# Patient Record
Sex: FEMALE | Race: WHITE | HISPANIC OR LATINO | Employment: UNEMPLOYED | URBAN - METROPOLITAN AREA
[De-identification: names, ages, dates, MRNs, and addresses within clinical notes are randomized per-mention and may not be internally consistent; named-entity substitution may affect disease eponyms.]

---

## 2017-01-09 ENCOUNTER — TELEPHONE (OUTPATIENT)
Dept: OBSTETRICS AND GYNECOLOGY | Facility: CLINIC | Age: 22
End: 2017-01-09

## 2017-01-09 NOTE — TELEPHONE ENCOUNTER
Called patient and she states the cream helped for a few days but she is feeling vaginal itching again and states she is not having discharge just itching on the outside. Patient has an appt this Friday and will show us then in the meantime she will use hydrocortisone cream.

## 2017-01-09 NOTE — TELEPHONE ENCOUNTER
----- Message from Alana Zamorano sent at 1/9/2017  9:43 AM CST -----  Contact: self, [phone]  [place] speaking patient called in requesting to speak with you regarding infection medication prescribed. Please advise.

## 2017-01-13 ENCOUNTER — ROUTINE PRENATAL (OUTPATIENT)
Dept: OBSTETRICS AND GYNECOLOGY | Facility: CLINIC | Age: 22
End: 2017-01-13
Payer: MEDICAID

## 2017-01-13 VITALS
WEIGHT: 137.81 LBS | BODY MASS INDEX: 24.41 KG/M2 | DIASTOLIC BLOOD PRESSURE: 64 MMHG | SYSTOLIC BLOOD PRESSURE: 120 MMHG

## 2017-01-13 DIAGNOSIS — Z3A.19 19 WEEKS GESTATION OF PREGNANCY: Primary | ICD-10-CM

## 2017-01-13 DIAGNOSIS — N89.8 VAGINAL DISCHARGE: ICD-10-CM

## 2017-01-13 DIAGNOSIS — Z34.82 ENCOUNTER FOR SUPERVISION OF OTHER NORMAL PREGNANCY IN SECOND TRIMESTER: ICD-10-CM

## 2017-01-13 LAB
CANDIDA RRNA VAG QL PROBE: NEGATIVE
G VAGINALIS RRNA GENITAL QL PROBE: NEGATIVE
T VAGINALIS RRNA GENITAL QL PROBE: NEGATIVE

## 2017-01-13 PROCEDURE — 99213 OFFICE O/P EST LOW 20 MIN: CPT | Mod: PBBFAC,PO | Performed by: OBSTETRICS & GYNECOLOGY

## 2017-01-13 PROCEDURE — 87480 CANDIDA DNA DIR PROBE: CPT

## 2017-01-13 PROCEDURE — 99213 OFFICE O/P EST LOW 20 MIN: CPT | Mod: TH,S$PBB,, | Performed by: OBSTETRICS & GYNECOLOGY

## 2017-01-13 PROCEDURE — 99999 PR PBB SHADOW E&M-EST. PATIENT-LVL III: CPT | Mod: PBBFAC,,, | Performed by: OBSTETRICS & GYNECOLOGY

## 2017-01-13 RX ORDER — METRONIDAZOLE 500 MG/1
500 TABLET ORAL EVERY 12 HOURS
Qty: 14 TABLET | Refills: 0 | Status: SHIPPED | OUTPATIENT
Start: 2017-01-13 | End: 2017-01-20

## 2017-01-13 RX ORDER — CLOTRIMAZOLE AND BETAMETHASONE DIPROPIONATE 10; .64 MG/G; MG/G
CREAM TOPICAL
Qty: 15 G | Refills: 1 | Status: SHIPPED | OUTPATIENT
Start: 2017-01-13 | End: 2017-03-23

## 2017-01-14 NOTE — PROGRESS NOTES
No complaints today   No nausea or vomiting   Denies vaginal bleeding or cramping   FHT 155x'  Verified with Doppler.  Prenatal labs reviewed      General Pregnancy  recommendations given  PNV + H2O intake  Quad screen ordered but patient declined screening  FU in 4 weeks  Anatomy US at 20 weeks   RX flagyl sent       Sven Smiley M.D.   OB/GYN

## 2017-01-16 ENCOUNTER — TELEPHONE (OUTPATIENT)
Dept: OBSTETRICS AND GYNECOLOGY | Facility: CLINIC | Age: 22
End: 2017-01-16

## 2017-01-16 ENCOUNTER — LAB VISIT (OUTPATIENT)
Dept: LAB | Facility: HOSPITAL | Age: 22
End: 2017-01-16
Attending: OBSTETRICS & GYNECOLOGY
Payer: MEDICAID

## 2017-01-16 DIAGNOSIS — Z3A.15 15 WEEKS GESTATION OF PREGNANCY: ICD-10-CM

## 2017-01-16 PROCEDURE — 36415 COLL VENOUS BLD VENIPUNCTURE: CPT

## 2017-01-16 PROCEDURE — 81511 FTL CGEN ABNOR FOUR ANAL: CPT

## 2017-01-16 NOTE — TELEPHONE ENCOUNTER
----- Message from Alana Lona sent at 1/16/2017 12:19 PM CST -----  Contact: self, [phone]  Patient states she is calling back looking to reschedule her ultrasound appt. Please advise.

## 2017-01-16 NOTE — TELEPHONE ENCOUNTER
----- Message from Alana Lona sent at 1/16/2017 12:06 PM CST -----  Contact: self, [phone]  Patient called in requesting to reschedule her 1/18 ultrasound appt.  Please advise.

## 2017-01-18 ENCOUNTER — OFFICE VISIT (OUTPATIENT)
Dept: OBSTETRICS AND GYNECOLOGY | Facility: CLINIC | Age: 22
End: 2017-01-18
Payer: MEDICAID

## 2017-01-18 DIAGNOSIS — Z3A.19 19 WEEKS GESTATION OF PREGNANCY: ICD-10-CM

## 2017-01-18 DIAGNOSIS — Z36.3 ANTENATAL SCREENING FOR MALFORMATION USING ULTRASONICS: Primary | ICD-10-CM

## 2017-01-18 PROCEDURE — 76805 OB US >/= 14 WKS SNGL FETUS: CPT | Mod: 26,S$PBB,, | Performed by: OBSTETRICS & GYNECOLOGY

## 2017-01-18 PROCEDURE — 76805 OB US >/= 14 WKS SNGL FETUS: CPT | Mod: PBBFAC,PO

## 2017-01-19 ENCOUNTER — TELEPHONE (OUTPATIENT)
Dept: OBSTETRICS AND GYNECOLOGY | Facility: CLINIC | Age: 22
End: 2017-01-19

## 2017-01-19 NOTE — TELEPHONE ENCOUNTER
----- Message from Sven Smiley MD sent at 1/13/2017 11:48 PM CST -----  AFFIRM is  negative     Sven Smiley M.D.   OB/GYN

## 2017-01-19 NOTE — TELEPHONE ENCOUNTER
----- Message from Catherine Heredia sent at 1/19/2017  1:02 PM CST -----  Contact: ms jon with [facility]/[phone]  Please call to discuss maternity information

## 2017-01-25 LAB
ALPHA FETOPROTEIN MATERNAL: 50.1 NG/ML
DOWN RISK (<1:270): NORMAL
ETHNIC ORIGIN: NORMAL
GA METHOD: NORMAL
GESTATIONAL AGE (DAYS): 3
GESTATIONAL AGE (WEEKS): 19
HUMAN CHORIONIC GONADOTROPIN: 11.8 IU/ML
INHIBIN A: 129.3 PG/ML
INSULIN DEPEND. DIABETES: NORMAL
M.O.M. ALPHA FETOPROTEIN: 0.91
M.O.M. HCG: 0.54
M.O.M. INHIBIN A: 0.67
M.O.M. UNCONJ. ESTRIOL: 1.35
MATERNAL AGE AT EDD (YRS): 24
MATERNAL AGE FOR DOWN: NORMAL
MATERNAL WEIGHT (LBS): 133
MULTIPLE GESTATIONS: NORMAL
QUAD SCREEN INTERPRETATION: NORMAL
QUAD SCREEN: NEGATIVE
TRISOMY 18 (<1:100): NORMAL
UNCONJUGATED ESTRIOL: 2.49 NG/ML

## 2017-01-26 ENCOUNTER — TELEPHONE (OUTPATIENT)
Dept: OBSTETRICS AND GYNECOLOGY | Facility: CLINIC | Age: 22
End: 2017-01-26

## 2017-01-26 NOTE — TELEPHONE ENCOUNTER
Spoke to on call doctor Dr. Wiedemann and he states we are going to call in Diflucan 150 mg #1 refill x1 take by mouth starting today. Called pharmacy they agreed and verbalized understanding.

## 2017-01-26 NOTE — TELEPHONE ENCOUNTER
Called patient she states she is still having vaginal discharge and it looks like cottage cheese and also complains of itching states vaginal cream sent irritates her and feels like its burning instructed her to discontinue cream and I will speak to the doctor about calling in a different medication patient agreed and verbalized understanding.

## 2017-01-26 NOTE — TELEPHONE ENCOUNTER
----- Message from Alana Lona sent at 1/26/2017 11:32 AM CST -----  Contact: self, [phone]  Patient requests medication for an infection sent to her pharmacy. Please advise.

## 2017-01-28 NOTE — PROGRESS NOTES
Quad Screen resulted:  SCREEN NEGATIVE FOR DOWN SYNDROME, NEURAL TUBE DEFECTS   AND TRISOMY 18     Sven Smiley M.D.   OB/GYN

## 2017-02-01 ENCOUNTER — TELEPHONE (OUTPATIENT)
Dept: OBSTETRICS AND GYNECOLOGY | Facility: CLINIC | Age: 22
End: 2017-02-01

## 2017-02-01 NOTE — TELEPHONE ENCOUNTER
----- Message from Danica Pace sent at 2/1/2017 10:30 AM CST -----  Contact: Self [phone]  Patient is calling to talk to nurse she would like to know if the doctor can prescribe her a medication for a vaginal infection and she needs refills on her prenatal. Please advice

## 2017-02-01 NOTE — TELEPHONE ENCOUNTER
Called and informed patient that vitamins have 5 refills and she just need to request refill and patient states the vaginal burning is still going on and I informed her she would need to come in for us to reevaluate since it still has not cleared up and patient has been on a lot of different cream and medications patient agreed and verbalized understanding.

## 2017-02-02 ENCOUNTER — ROUTINE PRENATAL (OUTPATIENT)
Dept: OBSTETRICS AND GYNECOLOGY | Facility: CLINIC | Age: 22
End: 2017-02-02
Payer: MEDICAID

## 2017-02-02 VITALS
WEIGHT: 138.25 LBS | SYSTOLIC BLOOD PRESSURE: 104 MMHG | BODY MASS INDEX: 24.49 KG/M2 | DIASTOLIC BLOOD PRESSURE: 80 MMHG

## 2017-02-02 DIAGNOSIS — N89.8 VAGINAL DISCHARGE: ICD-10-CM

## 2017-02-02 DIAGNOSIS — Z3A.21 21 WEEKS GESTATION OF PREGNANCY: Primary | ICD-10-CM

## 2017-02-02 DIAGNOSIS — Z34.82 ENCOUNTER FOR SUPERVISION OF OTHER NORMAL PREGNANCY IN SECOND TRIMESTER: ICD-10-CM

## 2017-02-02 PROCEDURE — 99999 PR PBB SHADOW E&M-EST. PATIENT-LVL II: CPT | Mod: PBBFAC,,, | Performed by: OBSTETRICS & GYNECOLOGY

## 2017-02-02 PROCEDURE — 99212 OFFICE O/P EST SF 10 MIN: CPT | Mod: PBBFAC,PO | Performed by: OBSTETRICS & GYNECOLOGY

## 2017-02-02 PROCEDURE — 99213 OFFICE O/P EST LOW 20 MIN: CPT | Mod: TH,S$PBB,, | Performed by: OBSTETRICS & GYNECOLOGY

## 2017-02-02 PROCEDURE — 87480 CANDIDA DNA DIR PROBE: CPT

## 2017-02-02 RX ORDER — NYSTATIN AND TRIAMCINOLONE ACETONIDE 100000; 1 [USP'U]/G; MG/G
OINTMENT TOPICAL 2 TIMES DAILY
Qty: 60 G | Refills: 0 | Status: SHIPPED | OUTPATIENT
Start: 2017-02-02 | End: 2017-03-23

## 2017-02-02 NOTE — PROGRESS NOTES
Vaginal itching, and discharge  Took diflucan with some relief  No nausea or vomiting   Denies vaginal bleeding or cramping   FHT 144x'  Verified with Doppler.  Prenatal labs reviewed      General Pregnancy  recommendations given  PNV + H2O intake  Quad screen ordered but patient declined screening  FU in 4 weeks  Anatomy US at 20 weeks normal anatomy     AFFIRM today   Mycolog cream to apply locally on external genitalia     Sven Smiley M.D.   OB/GYN

## 2017-02-04 LAB
CANDIDA RRNA VAG QL PROBE: POSITIVE
G VAGINALIS RRNA GENITAL QL PROBE: POSITIVE
T VAGINALIS RRNA GENITAL QL PROBE: NEGATIVE

## 2017-02-06 ENCOUNTER — TELEPHONE (OUTPATIENT)
Dept: OBSTETRICS AND GYNECOLOGY | Facility: CLINIC | Age: 22
End: 2017-02-06

## 2017-02-06 RX ORDER — METRONIDAZOLE 500 MG/1
500 TABLET ORAL EVERY 12 HOURS
Qty: 14 TABLET | Refills: 0 | Status: SHIPPED | OUTPATIENT
Start: 2017-02-06 | End: 2017-02-13

## 2017-02-06 RX ORDER — FLUCONAZOLE 150 MG/1
150 TABLET ORAL ONCE
Qty: 1 TABLET | Refills: 2 | Status: SHIPPED | OUTPATIENT
Start: 2017-02-06 | End: 2017-02-06

## 2017-02-06 NOTE — TELEPHONE ENCOUNTER
Affirm resulted  Positive for BV and Yeast .  Rx for flagyl and diflucan sent to pharmacy on file  Please inform patient    Sven Smiley M.D.   OB/GYN

## 2017-02-06 NOTE — TELEPHONE ENCOUNTER
Called and informed patient of results and Rx at pharmacy patient agreed and verbalized understanding.

## 2017-02-10 NOTE — TELEPHONE ENCOUNTER
Pt never picked her prescription and was picking up OTC prenatals. Pt is now requesting a script. Medication has been pended.

## 2017-02-10 NOTE — TELEPHONE ENCOUNTER
----- Message from Yuliet Ghotra sent at 2/9/2017 12:38 PM CST -----  Contact: [phone]/ self   Pt its requesting a refill on her prenatal vitamins . Please advise

## 2017-02-20 ENCOUNTER — HOSPITAL ENCOUNTER (OUTPATIENT)
Facility: HOSPITAL | Age: 22
Discharge: HOME | End: 2017-02-20
Attending: OBSTETRICS & GYNECOLOGY | Admitting: OBSTETRICS & GYNECOLOGY
Payer: MEDICAID

## 2017-02-20 ENCOUNTER — TELEPHONE (OUTPATIENT)
Dept: OBSTETRICS AND GYNECOLOGY | Facility: CLINIC | Age: 22
End: 2017-02-20

## 2017-02-20 DIAGNOSIS — O36.8190 DECREASED FETAL MOVEMENT: ICD-10-CM

## 2017-02-20 PROCEDURE — 99211 OFF/OP EST MAY X REQ PHY/QHP: CPT

## 2017-02-20 NOTE — PLAN OF CARE
Pt arrived to unit from home with c/o decreased fetal movement. Pt states she has not felt her baby move in 4 hours. Pt denins abd pain, leaking fluid, or vaginal bleeding. Pt shown to room 357, changing into gown.     1050- no trouble obtained fht's with efm. Audible movement on monitor. Pt relieved to hear baby's heartbeat. Dr. smiley paged.    1058- spoke with dr. Smiley. Informed md pt came in with complaints of DFM, but efm's are great, and baby heard moving on monitor. Discharge orders received.

## 2017-02-20 NOTE — TELEPHONE ENCOUNTER
Had to call this patients number to speak with another patient. Upon calling this patient, pt informed us that she has not felt her baby move in 24 hours. Advised pt that she needs to go to L&D.

## 2017-02-20 NOTE — IP AVS SNAPSHOT
[facility]  [address]  [place] [address]  Phone: [phone]           Instrucciones de Kelly de Pacientes    Nuestro objetivo es que te prepara para el éxito. Nancy paquete incluye información sobre gastelum enfermedad, medicamentos y atención médica a domicilio. [employer] le ayudará a cuidar y que no se enferman más y necesita volver al hospital.     Por favor, pregunte a gastelum enfermera si tiene alguna pregunta.       Hay muchos detalles a recordar cuando se prepara para salir del hospital. [employer] es lo que necesita hacer:    1. [place] gastelum medicina. Si usted tiene [place] receta, revise la lista de medicamentos en las siguientes páginas. Es posible que tenga nuevos medicamentos para recoger en la farmacia y otros que tendrá que dejar de rayshawn. Revise las instrucciones sobre cómo y cuándo rayshawn abel medicamentos. Consulte con el médico o el enfermeras si no está seguro de qué hacer.    2. Ir a abel citas de seguimiento. La información específica de seguimiento aparece en las siguientes páginas. Usted puede ser contactado por [place] enfermera o proveedor clínico sobre las próximas citas. Estar seguro de que tiene todos los números de teléfono para comunicarse si es necesario. Por favor, póngase en contacto con la oficina de gastelum profesional médico si no puede hacer [place] thalia.     3. Esté atento a señales de advertencia. El médico o la enfermera le dará señales de alarma detallados que debe observar y cuándo llamar para la ayuda. Estas instrucciones también pueden incluir información educativa acerca de gastelum condición. Si usted experimenta cualquiera de las señales de advertencia para gastelum manohar, llame a gastelum médico.             [facility]    Si gastelum médico no le ha indicado a lo contrário, por favor   contactar a [facility] [place], nuestra línea de cuidado de enfermeras está disponible para asistirle 24/7.    [phone] (servicio gratuito)    Enfermeras registradas de [facility] pueden ayudarle a reservar [place] thalia,  proveer educación para la manohar, asesoría clínica, y otros servicios de asesoramiento.                  ** Verificar la lista de medicamentos es correcta y está actualizada. Llevar esto con usted en gloria de emergencia. Si carmela medicamentos rubin cambiado, por favor notifique a gastelum proveedor de atención médica.             Lista de medicamentos      CONSULTE con gastelum médico sobre estos medicamentos        Additional Info                      clotrimazole-betamethasone 1-0.05% cream   También conocido luis antonio:  LOTRISONE   Cantidad:  15 g   Recargas:  1    Instrucciones:  Use on external genitals 3 times a week     Begin Date    AM    Noon    PM    Bedtime       nystatin-triamcinolone ointment   También conocido luis antonio:  MYCOLOG   Cantidad:  60 g   Recargas:  0    Instrucciones:  Apply topically 2 (two) times daily.     Begin Date    AM    Noon    PM    Bedtime       PNV #26-iron ps-folic acid-dha 29 mg iron- 1 mg-200 mg Cap   También conocido luis antonio:  VITAFOL-ONE   Cantidad:  60 capsule   Recargas:  5   Dosis:  1 tablet    Instrucciones:  Take 1 tablet by mouth once daily.     Begin Date    AM    Noon    PM    Bedtime       terconazole 0.8 % vaginal cream   También conocido luis antonio:  TERAZOL 3   Cantidad:  20 g   Recargas:  0   Dosis:  1 applicator    Instrucciones:  Place 1 applicator vaginally nightly.     Begin Date    AM    Noon    PM    Bedtime                  Por favor traiga a todos las citas de seguimiento:    1. Maty copia de las instrucciones de maryam.  2. Todos los medicamentos que está tomando zen momento, en carmela envases originales.  3. Identificación y tarjeta de seguro.    Por favor llegue 15 minutos antes de la hora de la thalia.    Por favor llame con 24 horas de antelación si tiene que cambiar gastelum thalia y / o tiempo.        Carmela Citas Programadas     Mar 02, 2017  2:30 PM CST   Routine Prenatal Visit with MD [place] [facility] ([place]    [address]  [address] [facility], [address]  [place]  [address]   [phone]                  Instrucciones a julissa de maryam       Discharge home in stable condition. Follow up with Dr. Smiley at next scheduled appointment.      Información de Admisión     Fecha y hora Proveedor Departamento CSN    2/20/2017 10:24 AM Sven Smiley MD [facility] [id number]      Los proveedores de cuidado     Personal Médico Rol [facility] Teléfono principal de la oficina    Sven Smiley MD Attending Provider Obstetrics and Gynecology [phone]      Carmela signos vitales erica     Ultima menstruación                   08/21/2016           Recent Lab Values     No lab values to display.      Alergias     A partir del:  2/20/2017        No Known Allergies      Directiva Anticipada     Maty directiva anticipada es un documento que, en gloria de que ya no capaz de hacer decisiones por sí mismo, le dice a gastelum equipo médico qué tipo de tratamiento quiere o no quiere recibir, o que le gustaría rayshawn esas decisiones para usted . Si actualmente no tiene maty directiva anticipada, [facility] le anima a crear raghu. Para más información llame al: [phone] ([phone]), o entrando en [email or web address].        Language Assistance Services     ATTENTION: Language assistance services are available, free of charge. Please call [phone].      ATENCIÓN: Si habla español, tiene a gastelum disposición servicios gratuitos de asistencia lingüística. Llame al [phone].     CHÚ Ý: N?u b?n nói Ti?ng Vi?t, có các d?ch v? h? tr? ngôn ng? mi?n phí dành cho b?n. G?i s? [phone].        Registrarse para [facility]     La activación de gastelum cuenta [facility] es tan fácil luis antonio 1-2-3!    1) Ir a [email or web address], seleccione Registrarse Ahora, meter el código de activación y gastelum fecha de nacimiento, y seleccione Próximo.    [id number]  Expires: 4/6/2017 11:01 AM      2) Crear un nombre de usuario y contraseña para usar cuando se visita [facility] en  el futuro y selecciona [place] pregunta de seguridad en glroia de que pierda gastelum contraseña y seleccione Próximo.    3) Introduzca gastelum dirección de correo electrónico y cecil chinmay en Registrarse!    Información Adicional  Si tiene alguna pregunta, por favor, e-mail [email or web address] o llame al [phone] para hablar con nuestro personal. Recuerde, Aparnatrace no debe ser usada para necesidades urgentes. En gloria de emergencia médica, llame al 911.         [facility] cumple con las leyes federales aplicables de derechos civiles y no discrimina por motivos de puneet, color, origen nacional, edad, discapacidad, o sexo.                        [facility]  [address]  [place] [address]  Phone: [phone]           Patient Discharge Instructions     Our goal is to set you up for success. This packet includes information on your condition, medications, and your home care. It will help you to care for yourself so you don't get sicker and need to go back to the hospital.     Please ask your nurse if you have any questions.        There are many details to remember when preparing to leave the hospital. Here is what you will need to do:    1. Take your medicine. If you are prescribed medications, review your Medication List in the following pages. You may have new medications to  at the pharmacy and others that you'll need to stop taking. Review the instructions for how and when to take your medications. Talk with your doctor or nurses if you are unsure of what to do.     2. Go to your follow-up appointments. Specific follow-up information is listed in the following pages. Your may be contacted by a transition nurse or clinical provider about future appointments. Be sure we have all of the phone numbers to reach you, if needed. Please contact your provider's office if you are unable to make an appointment.     3. Watch for warning signs. Your doctor or nurse will give you detailed warning signs  to watch for and when to call for assistance. These instructions may also include educational information about your condition. If you experience any of warning signs to your health, call your doctor.               [facility]  Unless otherwise directed by your provider, please contact [facility], our nurse care line that is available for 24/7 assistance.     [phone] (toll-free)    Registered nurses in the [facility] provide clinical advisement, health education, appointment booking, and other advisory services.                ** Verificar la lista de medicamentos es correcta y está actualizada. Llevar esto con usted en gloria de emergencia. Si abel medicamentos rubin cambiado, por favor notifique a gastelum proveedor de atención médica.             Medication List      ASK your doctor about these medications        Additional Info                      clotrimazole-betamethasone 1-0.05% cream   Commonly known as:  LOTRISONE   Quantity:  15 g   Refills:  1    Instructions:  Use on external genitals 3 times a week     Begin Date    AM    Noon    PM    Bedtime       nystatin-triamcinolone ointment   Commonly known as:  MYCOLOG   Quantity:  60 g   Refills:  0    Instructions:  Apply topically 2 (two) times daily.     Begin Date    AM    Noon    PM    Bedtime       PNV #26-iron ps-folic acid-dha 29 mg iron- 1 mg-200 mg Cap   Commonly known as:  VITAFOL-ONE   Quantity:  60 capsule   Refills:  5   Dose:  1 tablet    Instructions:  Take 1 tablet by mouth once daily.     Begin Date    AM    Noon    PM    Bedtime       terconazole 0.8 % vaginal cream   Commonly known as:  TERAZOL 3   Quantity:  20 g   Refills:  0   Dose:  1 applicator    Instructions:  Place 1 applicator vaginally nightly.     Begin Date    AM    Noon    PM    Bedtime                  Por favor traiga a todos las citas de seguimiento:    1. Maty copia de las instrucciones de maryam.  2. Todos los medicamentos que está tomando zen momento, en  abel envases originales.  3. Identificación y tarjeta de seguro.    Por favor llegue 15 minutos antes de la hora de la thalia.    Por favor llame con 24 horas de antelación si tiene que cambiar gastelum thalia y / o tiempo.        Your Scheduled Appointments     Mar 02, 2017  2:30 PM CST   Routine Prenatal Visit with MD [place] [facility] ([place]    [address]  [address] [facility], [address]  [place] [id number] [phone]                  Discharge Instructions       Discharge home in stable condition. Follow up with Dr. Smiley at next scheduled appointment.      Admission Information     Date & Time Provider Department CSN    2/20/2017 10:24 AM Sven Smiley MD [facility] [id number]      Care Providers     Provider Role Specialty Primary office phone    Sven Smiley MD Attending Provider Obstetrics and Gynecology [phone]      Your Vitals Were     Last Period                   08/21/2016           Recent Lab Values     No lab values to display.      Allergies as of 2/20/2017     No Known Allergies      Advance Directives     An advance directive is a document which, in the event you are no longer able to make decisions for yourself, tells your healthcare team what kind of treatment you do or do not want to receive, or who you would like to make those decisions for you.  If you do not currently have an advance directive, [facility] encourages you to create one.  For more information call:  [phone]), [phone] ([phone]),  or log on to [email or web address].        Language Assistance Services     ATTENTION: Language assistance services are available, free of charge. Please call [phone].      ATENCIÓN: Si habla español, tiene a gastelum disposición servicios gratuitos de asistencia lingüística. Llame al [phone].     CHÚ Ý: N?u b?n nói Ti?ng Vi?t, có các d?ch v? h? tr? ngôn ng? mi?n phí dành cho b?n. G?i s? [phone].         [facility] Sign-Up     Activating your [facility] account is as easy as 1-2-3!     1) Visit [email or web address], select Sign Up Now, enter this activation code and your date of birth, then select Next.  [email or web address]  Expires: 4/6/2017 11:01 AM      2) Create a username and password to use when you visit [facility] in the future and select a security question in case you lose your password and select Next.    3) Enter your e-mail address and click Sign Up!    Additional Information  If you have questions, please e-mail [email or web address] or call [phone] to talk to our [facility] staff. Remember, [facility] is NOT to be used for urgent needs. For medical emergencies, dial 911.          [facility] complies with applicable Federal civil rights laws and does not discriminate on the basis of race, color, national origin, age, disability, or sex.

## 2017-03-02 ENCOUNTER — ROUTINE PRENATAL (OUTPATIENT)
Dept: OBSTETRICS AND GYNECOLOGY | Facility: CLINIC | Age: 22
End: 2017-03-02
Payer: MEDICAID

## 2017-03-02 VITALS
WEIGHT: 142.88 LBS | BODY MASS INDEX: 25.31 KG/M2 | SYSTOLIC BLOOD PRESSURE: 112 MMHG | DIASTOLIC BLOOD PRESSURE: 68 MMHG

## 2017-03-02 DIAGNOSIS — N89.8 VAGINAL DISCHARGE: Primary | ICD-10-CM

## 2017-03-02 DIAGNOSIS — Z34.82 ENCOUNTER FOR SUPERVISION OF OTHER NORMAL PREGNANCY IN SECOND TRIMESTER: ICD-10-CM

## 2017-03-02 DIAGNOSIS — Z3A.25 25 WEEKS GESTATION OF PREGNANCY: ICD-10-CM

## 2017-03-02 PROCEDURE — 99999 PR PBB SHADOW E&M-EST. PATIENT-LVL II: CPT | Mod: PBBFAC,,, | Performed by: OBSTETRICS & GYNECOLOGY

## 2017-03-02 PROCEDURE — 87480 CANDIDA DNA DIR PROBE: CPT

## 2017-03-02 PROCEDURE — 99212 OFFICE O/P EST SF 10 MIN: CPT | Mod: PBBFAC,PO | Performed by: OBSTETRICS & GYNECOLOGY

## 2017-03-02 PROCEDURE — 99213 OFFICE O/P EST LOW 20 MIN: CPT | Mod: TH,S$PBB,, | Performed by: OBSTETRICS & GYNECOLOGY

## 2017-03-03 ENCOUNTER — TELEPHONE (OUTPATIENT)
Dept: OBSTETRICS AND GYNECOLOGY | Facility: CLINIC | Age: 22
End: 2017-03-03

## 2017-03-03 RX ORDER — METRONIDAZOLE 500 MG/1
500 TABLET ORAL EVERY 12 HOURS
Qty: 14 TABLET | Refills: 0 | Status: SHIPPED | OUTPATIENT
Start: 2017-03-03 | End: 2017-03-10

## 2017-03-03 RX ORDER — FLUCONAZOLE 150 MG/1
150 TABLET ORAL ONCE
Qty: 1 TABLET | Refills: 2 | Status: SHIPPED | OUTPATIENT
Start: 2017-03-03 | End: 2017-03-03

## 2017-03-03 NOTE — PROGRESS NOTES
Vaginal itching, and discharge  Took diflucan with some relief but coming back   No nausea or vomiting   Denies vaginal bleeding or cramping   FHT 138x'  Verified with Doppler.  Prenatal labs reviewed  AFFIRM     General Pregnancy  recommendations given  PNV + H2O intake  Quad screen ordered but patient declined screening  FU in 4 weeks  Anatomy US at 20 weeks normal anatomy     AFFIRM today   Mycolog cream to apply locally on external genitalia     Sven Smiley M.D.   OB/GYN

## 2017-03-06 ENCOUNTER — TELEPHONE (OUTPATIENT)
Dept: OBSTETRICS AND GYNECOLOGY | Facility: CLINIC | Age: 22
End: 2017-03-06

## 2017-03-09 ENCOUNTER — HOSPITAL ENCOUNTER (OUTPATIENT)
Facility: HOSPITAL | Age: 22
Discharge: HOME | End: 2017-03-09
Attending: OBSTETRICS & GYNECOLOGY | Admitting: OBSTETRICS & GYNECOLOGY
Payer: MEDICAID

## 2017-03-09 ENCOUNTER — TELEPHONE (OUTPATIENT)
Dept: OBSTETRICS AND GYNECOLOGY | Facility: CLINIC | Age: 22
End: 2017-03-09

## 2017-03-09 VITALS — TEMPERATURE: 99 F

## 2017-03-09 DIAGNOSIS — Z3A.26 26 WEEKS GESTATION OF PREGNANCY: ICD-10-CM

## 2017-03-09 PROCEDURE — 96372 THER/PROPH/DIAG INJ SC/IM: CPT

## 2017-03-09 PROCEDURE — 99220 PR INITIAL OBSERVATION CARE,LEVL III: CPT | Mod: 25,,, | Performed by: OBSTETRICS & GYNECOLOGY

## 2017-03-09 PROCEDURE — 99211 OFF/OP EST MAY X REQ PHY/QHP: CPT

## 2017-03-09 PROCEDURE — 59025 FETAL NON-STRESS TEST: CPT

## 2017-03-09 PROCEDURE — 63600175 PHARM REV CODE 636 W HCPCS: Performed by: OBSTETRICS & GYNECOLOGY

## 2017-03-09 PROCEDURE — 59025 FETAL NON-STRESS TEST: CPT | Mod: 26,,, | Performed by: OBSTETRICS & GYNECOLOGY

## 2017-03-09 RX ORDER — ACETAMINOPHEN 500 MG
500 TABLET ORAL EVERY 6 HOURS PRN
Status: DISCONTINUED | OUTPATIENT
Start: 2017-03-09 | End: 2017-03-09 | Stop reason: HOSPADM

## 2017-03-09 RX ORDER — TERBUTALINE SULFATE 1 MG/ML
0.25 INJECTION SUBCUTANEOUS ONCE
Status: COMPLETED | OUTPATIENT
Start: 2017-03-09 | End: 2017-03-09

## 2017-03-09 RX ORDER — TERBUTALINE SULFATE 1 MG/ML
INJECTION SUBCUTANEOUS
Status: DISCONTINUED
Start: 2017-03-09 | End: 2017-03-09 | Stop reason: HOSPADM

## 2017-03-09 RX ORDER — ONDANSETRON 8 MG/1
8 TABLET, ORALLY DISINTEGRATING ORAL EVERY 8 HOURS PRN
Status: DISCONTINUED | OUTPATIENT
Start: 2017-03-09 | End: 2017-03-09 | Stop reason: HOSPADM

## 2017-03-09 RX ADMIN — TERBUTALINE SULFATE 0.25 MG: 1 INJECTION SUBCUTANEOUS at 05:03

## 2017-03-09 NOTE — IP AVS SNAPSHOT
[facility]  [address]  [place] [address]  Phone: [phone]           Instrucciones de Kelly de Pacientes    Nuestro objetivo es que te prepara para el éxito. Nancy paquete incluye información sobre gastelum enfermedad, medicamentos y atención médica a domicilio. [place] le ayudará a cuidar y que no se enferman más y necesita volver al hospital.     Por favor, pregunte a gastelum enfermera si tiene alguna pregunta.       Hay muchos detalles a recordar cuando se prepara para salir del hospital. [place] es lo que necesita hacer:    1. [place] gastelum medicina. Si usted tiene [place] receta, revise la lista de medicamentos en las siguientes páginas. Es posible que tenga nuevos medicamentos para recoger en la farmacia y otros que tendrá que dejar de rayshawn. Revise las instrucciones sobre cómo y cuándo rayshawn abel medicamentos. Consulte con el médico o el enfermeras si no está seguro de qué hacer.    2. Ir a abel citas de seguimiento. La información específica de seguimiento aparece en las siguientes páginas. Usted puede ser contactado por [place] enfermera o proveedor clínico sobre las próximas citas. Estar seguro de que tiene todos los números de teléfono para comunicarse si es necesario. Por favor, póngase en contacto con la oficina de gastelum profesional médico si no puede hacer [place] thalia.     3. Esté atento a señales de advertencia. El médico o la enfermera le dará señales de alarma detallados que debe observar y cuándo llamar para la ayuda. Estas instrucciones también pueden incluir información educativa acerca de gastelum condición. Si usted experimenta cualquiera de las señales de advertencia para gastelum manohar, llame a gastelum médico.             [facility]    Si gastelum médico no le ha indicado a lo contrário, por favor   contactar a [facility], nuestra línea de cuidado de enfermeras está disponible para asistirle 24/7.    [phone] (servicio gratuito)    Enfermeras registradas de [facility] pueden ayudarle a reservar [place] thalia,  proveer educación para la manohar, asesoría clínica, y otros servicios de asesoramiento.                  ** Verificar la lista de medicamentos es correcta y está actualizada. Llevar esto con usted en gloria de emergencia. Si abel medicamentos rubin cambiado, por favor notifique a gastelum proveedor de atención médica.             Lista de medicamentos      CONSULTE con gastelum médico sobre estos medicamentos        Additional Info                      clotrimazole-betamethasone 1-0.05% cream   También conocido luis antonio:  LOTRISONE   Cantidad:  15 g   Recargas:  1    Instrucciones:  Use on external genitals 3 times a week     Begin Date    AM    Noon    PM    Bedtime       metronidazole 500 MG tablet   También conocido luis antonio:  FLAGYL   Cantidad:  14 tablet   Recargas:  0   Dosis:  500 mg    Instrucciones:  Take 1 tablet (500 mg total) by mouth every 12 (twelve) hours.     Begin Date    AM    Noon    PM    Bedtime       nystatin-triamcinolone ointment   También conocido luis antonio:  MYCOLOG   Cantidad:  60 g   Recargas:  0    Instrucciones:  Apply topically 2 (two) times daily.     Begin Date    AM    Noon    PM    Bedtime       PNV #26-iron ps-folic acid-dha 29 mg iron- 1 mg-200 mg Cap   También conocido luis antonio:  VITAFOL-ONE   Cantidad:  60 capsule   Recargas:  5   Dosis:  1 tablet    Instrucciones:  Take 1 tablet by mouth once daily.     Begin Date    AM    Noon    PM    Bedtime       terconazole 0.8 % vaginal cream   También conocido luis antonio:  TERAZOL 3   Cantidad:  20 g   Recargas:  0   Dosis:  1 applicator    Instrucciones:  Place 1 applicator vaginally nightly.     Begin Date    AM    Noon    PM    Bedtime                  Por favor traiga a todos las citas de seguimiento:    1. Maty copia de las instrucciones de maryam.  2. Todos los medicamentos que está tomando zen momento, en abel envases originales.  3. Identificación y tarjeta de seguro.    Por favor llegue 15 minutos antes de la hora de la thalia.    Por favor llame con 24 horas de antelación  si tiene que cambiar gastelum thalia y / o tiempo.        Carmela Citas Programadas     Mar 16, 2017  3:00 PM CDT   Non-Fasting Lab with APPOINTMENT LAB, [facility]   [facility])    [address]  [place] [id number] [phone]            Mar 23, 2017  3:00 PM CDT   Routine Prenatal Visit with MD Kamryn [place] - OB/GYN ([place])    [address]  [address]th [address] [facility], [address]  [place] [address] [phone]                  Instrucciones a julissa de maryam       Patient instructed to return to the ER for any further problems. Instructed to keep scheduled appointment with Dr. Smiley. Instructed to increase fluid intake and to rest as needed.       Información de Admisión     Fecha y hora Proveedor Departamento CSN    3/9/2017  4:08 PM Sven Smiley MD [facility] [id number]      Los proveedores de cuidado     Personal Médico Rol [facility] Teléfono principal de la oficina    Sven Smiley MD Attending Provider Obstetrics and Gynecology [phone]      Carmela signos vitales erica     Temperatura Ultima menstruación                98.5 °F (36.9 °C) (Oral) 08/21/2016          Recent Lab Values     No lab values to display.      Alergias     A partir del:  3/9/2017        No Known Allergies      Directiva Anticipada     Maty directiva anticipada es un documento que, en gloria de que ya no capaz de hacer decisiones por sí mismo, le dice a gastelum equipo médico qué tipo de tratamiento quiere o no quiere recibir, o que le gustaría rayshawn esas decisiones para usted . Si actualmente no tiene maty directiva anticipada, [facility] le anima a crear raghu. Para más información llame al: [phone] ([phone]), o entrando en [email or web address].        Language Assistance Services     ATTENTION: Language assistance services are available, free of charge. Please call [phone].      ATENCIÓN: ronni Brand  disposición servicios gratuitos de asistencia lingüística. Llame al [phone].     [facility] Ý: N?u b?n nói Ti?ng Vi?t, có các d?ch v? h? tr? ngôn ng? mi?n phí dành cho b?n. G?i s? [phone].        Registrarse para [facility]     La activación de gastelum cuenta [facility] es tan fácil luis antonio 1-2-3!    1) Ir a [email or web address], seleccione Registrarse Ahora, meter el código de activación y gastelum fecha de nacimiento, y seleccione Próximo.    [id number]  Expires: 4/6/2017 11:01 AM      2) Crear un nombre de usuario y contraseña para usar cuando se visita [facility] en el futuro y selecciona [place] pregunta de seguridad en gloria de que pierda gastelum contraseña y seleccione Próximo.    3) Introduzca gastelum dirección de correo electrónico y cecil clic en Registrarse!    Información Adicional  Si tiene alguna pregunta, por favor, e-mail [email or web address] o llame al [phone] para hablar con nuestro personal. Recuerde, [facility] no debe ser usada para necesidades urgentes. En golria de emergencia médica, llame al 911.         [facility] cumple con las leyes federales aplicables de derechos civiles y no discrimina por motivos de puneet, color, origen nacional, edad, discapacidad, o sexo.                        \[facility] [place] [facility]\""  [address]  [place] [address]  Phone: [phone]           Patient Discharge Instructions     Our goal is to set you up for success. This packet includes information on your condition, medications, and your home care. It will help you to care for yourself so you don't get sicker and need to go back to the hospital.     Please ask your nurse if you have any questions.        There are many details to remember when preparing to leave the hospital. Here is what you will need to do:    1. Take your medicine. If you are prescribed medications, review your Medication List in the following pages. You may have new medications to  at the pharmacy and others that you'll need to stop taking.  Review the instructions for how and when to take your medications. Talk with your doctor or nurses if you are unsure of what to do.     2. Go to your follow-up appointments. Specific follow-up information is listed in the following pages. Your may be contacted by a transition nurse or clinical provider about future appointments. Be sure we have all of the phone numbers to reach you, if needed. Please contact your provider's office if you are unable to make an appointment.     3. Watch for warning signs. Your doctor or nurse will give you detailed warning signs to watch for and when to call for assistance. These instructions may also include educational information about your condition. If you experience any of warning signs to your health, call your doctor.               [facility]  Unless otherwise directed by your provider, please contact [facility], our nurse care line that is available for 24/7 assistance.     [phone] (toll-free)    Registered nurses in the [facility] provide clinical advisement, health education, appointment booking, and other advisory services.                ** Verificar la lista de medicamentos es correcta y está actualizada. Llevar esto con usted en gloria de emergencia. Si abel medicamentos rubin cambiado, por favor notifique a gastelum proveedor de atención médica.             Medication List      ASK your doctor about these medications        Additional Info                      clotrimazole-betamethasone 1-0.05% cream   Commonly known as:  LOTRISONE   Quantity:  15 g   Refills:  1    Instructions:  Use on external genitals 3 times a week     Begin Date    AM    Noon    PM    Bedtime       metronidazole 500 MG tablet   Commonly known as:  FLAGYL   Quantity:  14 tablet   Refills:  0   Dose:  500 mg    Instructions:  Take 1 tablet (500 mg total) by mouth every 12 (twelve) hours.     Begin Date    AM    Noon    PM    Bedtime       nystatin-triamcinolone ointment   Commonly  known as:  MYCOLOG   Quantity:  60 g   Refills:  0    Instructions:  Apply topically 2 (two) times daily.     Begin Date    AM    Noon    PM    Bedtime       PNV #26-iron ps-folic acid-dha 29 mg iron- 1 mg-200 mg Cap   Commonly known as:  VITAFOL-ONE   Quantity:  60 capsule   Refills:  5   Dose:  1 tablet    Instructions:  Take 1 tablet by mouth once daily.     Begin Date    AM    Noon    PM    Bedtime       terconazole 0.8 % vaginal cream   Commonly known as:  TERAZOL 3   Quantity:  20 g   Refills:  0   Dose:  1 applicator    Instructions:  Place 1 applicator vaginally nightly.     Begin Date    AM    Noon    PM    Bedtime                  Por favor traiga a todos las citas de seguimiento:    1. Maty copia de las instrucciones de maryam.  2. Todos los medicamentos que está tomando zen momento, en abel envases originales.  3. Identificación y tarjeta de seguro.    Por favor llegue 15 minutos antes de la hora de la thalia.    Por favor llame con 24 horas de antelación si tiene que cambiar gastelum thalia y / o tiempo.        Your Scheduled Appointments     Mar 16, 2017  3:00 PM CDT   Non-Fasting Lab with APPOINTMENT LAB, [facility])    [address]  [place] [id number] [phone]            Mar 23, 2017  3:00 PM CDT   Routine Prenatal Visit with MD [place]ryn [place] - OB/GYN ([place])    [address]  5th Floor [facility], [address]  [place] [id number] [phone]                  Discharge Instructions       Patient instructed to return to the ER for any further problems. Instructed to keep scheduled appointment with Dr. Smiley. Instructed to increase fluid intake and to rest as needed.       Admission Information     Date & Time Provider Department CSN    3/9/2017  4:08 PM Sven Smiley MD [facility] [id number]      Care Providers     Provider Role Specialty Primary office phone    Sven Smiley MD Attending Provider  Obstetrics and Gynecology [phone]      Your Vitals Were     Temp Last Period                98.5 °F (36.9 °C) (Oral) 08/21/2016          Recent Lab Values     No lab values to display.      Allergies as of 3/9/2017     No Known Allergies      Advance Directives     An advance directive is a document which, in the event you are no longer able to make decisions for yourself, tells your healthcare team what kind of treatment you do or do not want to receive, or who you would like to make those decisions for you.  If you do not currently have an advance directive, [facility] encourages you to create one.  For more information call:  [phone] ([phone]),  or log on to [email or web address].        Language Assistance Services     ATTENTION: Language assistance services are available, free of charge. Please call [phone].      ATENCIÓN: Si habla español, tiene a gastelum disposición servicios gratuitos de asistencia lingüística. Llame al [phone].     CHÚ Ý: N?u b?n nói Ti?ng Vi?t, có các d?ch v? h? tr? ngôn ng? mi?n phí dành cho b?n. G?i s? [phone].        [facility] Sign-Up     Activating your [facility] account is as easy as 1-2-3!     1) Visit [email or web address], select Sign Up Now, enter this activation code and your date of birth, then select Next.  [email or web address]  Expires: 4/6/2017 11:01 AM      2) Create a username and password to use when you visit [facility] in the future and select a security question in case you lose your password and select Next.    3) Enter your e-mail address and click Sign Up!    Additional Information  If you have questions, please e-mail [email or web address] or call [phone] to talk to our [facility] staff. Remember, [facility] is NOT to be used for urgent needs. For medical emergencies, dial 911.          [facility] complies with applicable Federal civil rights laws and does not discriminate on the basis of race,  color, national origin, age, disability, or sex.

## 2017-03-09 NOTE — TELEPHONE ENCOUNTER
----- Message from Danica Pace sent at 3/9/2017 11:20 AM CST -----  Contact: Self [phone]  Patient is calling to talk to nurse she is 27 weeks pregnant she is having pain in between her legs and she cant hardly walk. Please advice

## 2017-03-09 NOTE — DISCHARGE INSTRUCTIONS
Patient instructed to return to the ER for any further problems. Instructed to keep scheduled appointment with Dr. Smiley. Instructed to increase fluid intake and to rest as needed.

## 2017-03-09 NOTE — PLAN OF CARE
Patient arrived to the unit with complaints of pressure in vagina. Swab done yon ARREAGA request FFN, SVE done.   17:00 Orders received for patient to have terbutaline injection  17:35 Dr. Wiedemann in room to see patient. Plan of care discussed, patient able to recall content easily.

## 2017-03-10 NOTE — TELEPHONE ENCOUNTER
----- Message from Danica Pace sent at 3/10/2017  1:42 PM CST -----  Contact: Self [phone]  Patient  would like to talk to nurse concerning she went to labor and delivery yesterday due to she was having contractions she is 27 weeks pregnant. Please advice

## 2017-03-10 NOTE — PROGRESS NOTES
20 y/o G3 P 1 pt of Dr Smiley at 26 weeks presents to LnD co contractions. Denies bleeding, srom, dysuria or diarrhea. Uncomplicated prenatal care so far, last baby term. No trauma.  PMH neg  PSH neg  Ob hx vag del x 1  shneg  pe vss  Head/neck normal,   abd  Gravid, soft in all quadrants, no rebound  Uterus nontender  cx closed,thick post rn  No rom  Extr normal  nst showes reactive fht by criteria with accelerations, by criteia  Uterine contractins/irritability has gone  Pt was given oral hydration and one dose terb, the irreg contractions stopped  She feels better with no co now  Discussed in detail, encouraged oral hydration, no work constantino  Call dr smiley in am to update him  Precautions given, pt dc to home

## 2017-03-13 ENCOUNTER — TELEPHONE (OUTPATIENT)
Dept: OBSTETRICS AND GYNECOLOGY | Facility: CLINIC | Age: 22
End: 2017-03-13

## 2017-03-13 ENCOUNTER — HOSPITAL ENCOUNTER (OUTPATIENT)
Facility: HOSPITAL | Age: 22
Discharge: HOME | End: 2017-03-13
Attending: OBSTETRICS & GYNECOLOGY | Admitting: OBSTETRICS & GYNECOLOGY
Payer: MEDICAID

## 2017-03-13 VITALS — SYSTOLIC BLOOD PRESSURE: 111 MMHG | DIASTOLIC BLOOD PRESSURE: 55 MMHG | HEART RATE: 112 BPM | OXYGEN SATURATION: 100 %

## 2017-03-13 DIAGNOSIS — R10.9 ABDOMINAL PRESSURE: ICD-10-CM

## 2017-03-13 LAB
BILIRUB UR QL STRIP: NEGATIVE
CLARITY UR: CLEAR
COLOR UR: YELLOW
GLUCOSE UR QL STRIP: NEGATIVE
HGB UR QL STRIP: NEGATIVE
KETONES UR QL STRIP: NEGATIVE
LEUKOCYTE ESTERASE UR QL STRIP: NEGATIVE
NITRITE UR QL STRIP: NEGATIVE
PH UR STRIP: 6 [PH] (ref 5–8)
PROT UR QL STRIP: NEGATIVE
SP GR UR STRIP: <=1.005 (ref 1–1.03)
URN SPEC COLLECT METH UR: ABNORMAL
UROBILINOGEN UR STRIP-ACNC: NEGATIVE EU/DL

## 2017-03-13 PROCEDURE — 25000003 PHARM REV CODE 250: Performed by: OBSTETRICS & GYNECOLOGY

## 2017-03-13 PROCEDURE — 99211 OFF/OP EST MAY X REQ PHY/QHP: CPT | Mod: 25

## 2017-03-13 PROCEDURE — 81003 URINALYSIS AUTO W/O SCOPE: CPT

## 2017-03-13 PROCEDURE — 63600175 PHARM REV CODE 636 W HCPCS: Performed by: OBSTETRICS & GYNECOLOGY

## 2017-03-13 PROCEDURE — 96372 THER/PROPH/DIAG INJ SC/IM: CPT

## 2017-03-13 PROCEDURE — 96360 HYDRATION IV INFUSION INIT: CPT

## 2017-03-13 RX ORDER — ACETAMINOPHEN 500 MG
500 TABLET ORAL EVERY 6 HOURS PRN
Status: DISCONTINUED | OUTPATIENT
Start: 2017-03-13 | End: 2017-03-13 | Stop reason: HOSPADM

## 2017-03-13 RX ORDER — TERBUTALINE SULFATE 1 MG/ML
0.25 INJECTION SUBCUTANEOUS
Status: DISCONTINUED | OUTPATIENT
Start: 2017-03-13 | End: 2017-03-13 | Stop reason: HOSPADM

## 2017-03-13 RX ADMIN — TERBUTALINE SULFATE 0.25 MG: 1 INJECTION SUBCUTANEOUS at 04:03

## 2017-03-13 RX ADMIN — SODIUM CHLORIDE, SODIUM LACTATE, POTASSIUM CHLORIDE, AND CALCIUM CHLORIDE 1000 ML: .6; .31; .03; .02 INJECTION, SOLUTION INTRAVENOUS at 03:03

## 2017-03-13 NOTE — DISCHARGE INSTRUCTIONS
Drink 8-10 glasses water daily/ keep bladder empty/ take Tylenol as directed on label as needed for pain. Return if off and on cramping not relieved by drinking 2 glasses water and resting/ or if bright red vaginal bleeding or if leaking fluid. Follow up with Dr Smiley as scheduled for next OB visit.

## 2017-03-13 NOTE — PLAN OF CARE
1345- Received to Triage#3- 20yo [id number] at 27.3 weeks with chief c/o lower abdominal pressure and vaginal/perinium pressure rated at 7 out of 10 pain level. Denies burning with urination and or off and on pain. Assisted to stretcher in hospital gown. Ext FHT and UC monitoring initiated. FHT's reassuring for 27wks. Pt denies   1415- Dr Smiley notified- report given. Orders received and noted.  1430- SVE= closed thick very posterior cervix. No blood on exam glove.  1500- IV started with 20 gu to lt inner forearm- up 1000cc LR opened for fluid bolus.   1601- Brethine 0.25mg subQ to rt deltoid area. Pt cried- due to it hurting so bad.  1630- Dr Smiley notified. Pt stopped having uterine irritability coontractions. OK'd discharge to home. Pt informed of pending discharge to home.  1645- Monitoring discontinued/ pt prepared for discharge to home. Iv removed- pressure dressing applied to lt inner forearm site.   99357- Discharged to home- undelivered.

## 2017-03-13 NOTE — TELEPHONE ENCOUNTER
----- Message from Jennifer German sent at 3/13/2017 12:22 PM CDT -----  Contact: [phone]  27 weeks gestation- Pt requesting nurse call back/Pt states she is having a pain vagina area/mild back pain. Please advise.

## 2017-03-13 NOTE — IP AVS SNAPSHOT
[facility]  [address]  [place] [address]  Phone: [phone]           Instrucciones de Kelly de Pacientes    Nuestro objetivo es que te prepara para el éxito. Nancy paquete incluye información sobre gastelum enfermedad, medicamentos y atención médica a domicilio. [place] le ayudará a cuidar y que no se enferman más y necesita volver al hospital.     Por favor, pregunte a gastelum enfermera si tiene alguna pregunta.       Hay muchos detalles a recordar cuando se prepara para salir del hospital. [place] es lo que necesita hacer:    1. [place] gastelum medicina. Si usted tiene [place] receta, revise la lista de medicamentos en las siguientes páginas. Es posible que tenga nuevos medicamentos para recoger en la farmacia y otros que tendrá que dejar de rayshawn. Revise las instrucciones sobre cómo y cuándo rayshawn abel medicamentos. Consulte con el médico o el enfermeras si no está seguro de qué hacer.    2. Ir a abel citas de seguimiento. La información específica de seguimiento aparece en las siguientes páginas. Usted puede ser contactado por [place] enfermera o proveedor clínico sobre las próximas citas. Estar seguro de que tiene todos los números de teléfono para comunicarse si es necesario. Por favor, póngase en contacto con la oficina de gastelum profesional médico si no puede hacer [place] thalia.     3. Esté atento a señales de advertencia. El médico o la enfermera le dará señales de alarma detallados que debe observar y cuándo llamar para la ayuda. Estas instrucciones también pueden incluir información educativa acerca de gastelum condición. Si usted experimenta cualquiera de las señales de advertencia para gastelum manohar, llame a gastelum médico.             [facility]    Si gastelum médico no le ha indicado a lo contrário, por favor   contactar a [facility] [place], nuestra línea de cuidado de enfermeras está disponible para asistirle 24/7.    [phone] (servicio gratuito)    Enfermeras registradas de [facility] pueden ayudarle a reservar [place] thalia,  proveer educación para la manohar, asesoría clínica, y otros servicios de asesoramiento.                  ** Verificar la lista de medicamentos es correcta y está actualizada. Llevar esto con usted en gloria de emergencia. Si carmela medicamentos rubin cambiado, por favor notifique a gastelum proveedor de atención médica.             Lista de medicamentos      CONSULTE con gastelum médico sobre estos medicamentos        Additional Info                      clotrimazole-betamethasone 1-0.05% cream   También conocido luis antonio:  LOTRISONE   Cantidad:  15 g   Recargas:  1    Instrucciones:  Use on external genitals 3 times a week     Begin Date    AM    Noon    PM    Bedtime       nystatin-triamcinolone ointment   También conocido luis antonio:  MYCOLOG   Cantidad:  60 g   Recargas:  0    Instrucciones:  Apply topically 2 (two) times daily.     Begin Date    AM    Noon    PM    Bedtime       PNV #26-iron ps-folic acid-dha 29 mg iron- 1 mg-200 mg Cap   También conocido luis antonio:  VITAFOL-ONE   Cantidad:  60 capsule   Recargas:  5   Dosis:  1 tablet    Instrucciones:  Take 1 tablet by mouth once daily.     Begin Date    AM    Noon    PM    Bedtime       terconazole 0.8 % vaginal cream   También conocido luis antonio:  TERAZOL 3   Cantidad:  20 g   Recargas:  0   Dosis:  1 applicator    Instrucciones:  Place 1 applicator vaginally nightly.     Begin Date    AM    Noon    PM    Bedtime                  Por favor traiga a todos las citas de seguimiento:    1. Maty copia de las instrucciones de maryam.  2. Todos los medicamentos que está tomando zen momento, en carmela envases originales.  3. Identificación y tarjeta de seguro.    Por favor llegue 15 minutos antes de la hora de la thalia.    Por favor llame con 24 horas de antelación si tiene que cambiar gastelum thalia y / o tiempo.        Carmela Citas Programadas     Mar 16, 2017  3:00 PM CDT   Non-Fasting Lab with APPOINTMENT LAB, KAMRYN MOB [facility] ([facility])    [address] [place]  [address] [phone]            Mar 23, 2017  3:00 PM CDT   Routine Prenatal Visit with MD [place] [facility] ([place])    [address]  5th Floor [facility], [address]  [place] [id number] [phone]                  Instrucciones a julissa de maryam       Drink 8-10 glasses water daily/ keep bladder empty/ take Tylenol as directed on label as needed for pain. Return if off and on cramping not relieved by drinking 2 glasses water and resting/ or if bright red vaginal bleeding or if leaking fluid. Follow up with Dr Smiley as scheduled for next OB visit.    Referencias/Adjuntos de maryam     KICK COUNTS (Ivorian)    FALSE LABOR (Ivorian)    PREMATURE LABOR (Ivorian)    PREGNANCY, ADAPTING TO: THIRD TRIMESTER (Ivorian)    PREGNANCY, COMFORT TIPS DURING (Ivorian)        Información de Admisión     Fecha y hora Proveedor Departamento CSN    3/13/2017  1:40 PM Sven Smiley MD [facility] [id number]      Los proveedores de cuidado     Personal Médico Rol [facility] Teléfono principal de la oficina    Sven Smiley MD Attending Provider Obstetrics and Gynecology [phone]      Carmela signos vitales erica     Ultima menstruación                   08/21/2016           Recent Lab Values     No lab values to display.      Alergias     A partir del:  3/13/2017        No Known Allergies      Directiva Anticipada     Maty directiva anticipada es un documento que, en gloria de que ya no capaz de hacer decisiones por sí mismo, le dice a gastelum equipo médico qué tipo de tratamiento quiere o no quiere recibir, o que le gustaría rayshawn esas decisiones para usted . Si actualmente no tiene maty directiva anticipada, [facility] le anima a crear raghu. Para más información llame al: [phone] ([phone]), o entrando en [email or web address].        Language Assistance Services     ATTENTION: Language assistance services are available, free of charge. Please call  [phone].      ATENCIÓN: Si habla español, tiene a gastelum disposición servicios gratuitos de asistencia lingüística. Llame al [phone].     CHÚ Ý: N?u b?n nói Ti?ng Vi?t, có các d?ch v? h? tr? ngôn ng? mi?n phí dành cho b?n. G?i s? [phone].        Registrarse para [facility]     La activación de gastelum cuenta [facility] es tan fácil luis antonio 1-2-3!    1) Ir a [email or web address], seleccione Registrarse Ahora, meter el código de activación y gastelum fecha de nacimiento, y seleccione Próximo.    [id number]  Expires: 4/6/2017 12:01 PM      2) Crear un nombre de usuario y contraseña para usar cuando se visita [facility] en el futuro y selecciona [place] pregunta de seguridad en gloria de que pierda gastelum contraseña y seleccione Próximo.    3) Introduzca gastelum dirección de correo electrónico y cecil chinmay en Registrarse!    Información Adicional  Si tiene alguna pregunta, por favor, e-mail [email or web address] o llame al [phone] para hablar con nuestro personal. Recuerde, [facility] no debe ser usada para necesidades urgentes. En gloria de emergencia médica, llame al 911.         [facility] cumple con las leyes federales aplicables de derechos civiles y no discrimina por motivos de puneet, color, origen nacional, edad, discapacidad, o sexo.                        [facility]  [address]  [place] [address]  Phone: [phone]           Patient Discharge Instructions     Our goal is to set you up for success. This packet includes information on your condition, medications, and your home care. It will help you to care for yourself so you don't get sicker and need to go back to the hospital.     Please ask your nurse if you have any questions.        There are many details to remember when preparing to leave the hospital. Here is what you will need to do:    1. Take your medicine. If you are prescribed medications, review your Medication List in the following pages. You may have new medications to   at the pharmacy and others that you'll need to stop taking. Review the instructions for how and when to take your medications. Talk with your doctor or nurses if you are unsure of what to do.     2. Go to your follow-up appointments. Specific follow-up information is listed in the following pages. Your may be contacted by a transition nurse or clinical provider about future appointments. Be sure we have all of the phone numbers to reach you, if needed. Please contact your provider's office if you are unable to make an appointment.     3. Watch for warning signs. Your doctor or nurse will give you detailed warning signs to watch for and when to call for assistance. These instructions may also include educational information about your condition. If you experience any of warning signs to your health, call your doctor.               [facility]  Unless otherwise directed by your provider, please contact [facility], our nurse care line that is available for 24/7 assistance.     [phone] (toll-free)    Registered nurses in the [facility] provide clinical advisement, health education, appointment booking, and other advisory services.                ** Verificar la lista de medicamentos es correcta y está actualizada. Llevar esto con usted en gloria de emergencia. Si abel medicamentos rubin cambiado, por favor notifique a gastelum proveedor de atención médica.             Medication List      ASK your doctor about these medications        Additional Info                      clotrimazole-betamethasone 1-0.05% cream   Commonly known as:  LOTRISONE   Quantity:  15 g   Refills:  1    Instructions:  Use on external genitals 3 times a week     Begin Date    AM    Noon    PM    Bedtime       nystatin-triamcinolone ointment   Commonly known as:  MYCOLOG   Quantity:  60 g   Refills:  0    Instructions:  Apply topically 2 (two) times daily.     Begin Date    AM    Noon    PM    Bedtime       PNV #26-iron ps-folic  acid-dha 29 mg iron- 1 mg-200 mg Cap   Commonly known as:  VITAFOL-ONE   Quantity:  60 capsule   Refills:  5   Dose:  1 tablet    Instructions:  Take 1 tablet by mouth once daily.     Begin Date    AM    Noon    PM    Bedtime       terconazole 0.8 % vaginal cream   Commonly known as:  TERAZOL 3   Quantity:  20 g   Refills:  0   Dose:  1 applicator    Instructions:  Place 1 applicator vaginally nightly.     Begin Date    AM    Noon    PM    Bedtime                  Por favor traiga a todos las citas de seguimiento:    1. Maty copia de las instrucciones de maryam.  2. Todos los medicamentos que está tomando zen momento, en abel envases originales.  3. Identificación y tarjeta de seguro.    Por favor llegue 15 minutos antes de la hora de la thalia.    Por favor llame con 24 horas de antelación si tiene que cambiar gastelum thalia y / o tiempo.        Your Scheduled Appointments     Mar 16, 2017  3:00 PM CDT   Non-Fasting Lab with APPOINTMENT LAB, KAMRYN MOB [facility] ([facility])    [address]  [facility] [id number] [phone]            Mar 23, 2017  3:00 PM CDT   Routine Prenatal Visit with MD [place] [facility] ([place])    [address]  5[address] [facility], [address]  [facility] [address] [phone]                  Discharge Instructions       Drink 8-10 glasses water daily/ keep bladder empty/ take Tylenol as directed on label as needed for pain. Return if off and on cramping not relieved by drinking 2 glasses water and resting/ or if bright red vaginal bleeding or if leaking fluid. Follow up with Dr Smiley as scheduled for next OB visit.    Discharge References/Attachments     KICK COUNTS (Gambian)    FALSE LABOR (Gambian)    PREMATURE LABOR (Gambian)    PREGNANCY, ADAPTING TO: THIRD TRIMESTER (Gambian)    PREGNANCY, COMFORT TIPS DURING (Gambian)        Admission Information     Date & Time Provider Department CSN    3/13/2017  1:40 PM Sven Smiley MD  [facility] [id number]      Care Providers     Provider Role Specialty Primary office phone    Sven Smiley MD Attending Provider Obstetrics and Gynecology [phone]      Your Vitals Were     Last Period                   08/21/2016           Recent Lab Values     No lab values to display.      Allergies as of 3/13/2017     No Known Allergies      Advance Directives     An advance directive is a document which, in the event you are no longer able to make decisions for yourself, tells your healthcare team what kind of treatment you do or do not want to receive, or who you would like to make those decisions for you.  If you do not currently have an advance directive, [facility] encourages you to create one.  For more information call:  [phone] ([phone]),  or log on to [email or web address].        Language Assistance Services     ATTENTION: Language assistance services are available, free of charge. Please call [phone].      ATENCIÓN: Si habla español, tiene a gastelum disposición servicios gratuitos de asistencia lingüística. Llame al [phone].     CHÚ Ý: N?u b?n nói Ti?ng Vi?t, có các d?ch v? h? tr? ngôn ng? mi?n phí dành cho b?n. G?i s? [phone].        [facility] Sign-Up     Activating your [facility] account is as easy as 1-2-3!     1) Visit [email or web address], select Sign Up Now, enter this activation code and your date of birth, then select Next.  [email or web address]  Expires: 4/6/2017 12:01 PM      2) Create a username and password to use when you visit [facility] in the future and select a security question in case you lose your password and select Next.    3) Enter your e-mail address and click Sign Up!    Additional Information  If you have questions, please e-mail [email or web address] or call [phone] to talk to our [facility] staff. Remember, [facility] is NOT to be used for urgent needs. For medical emergencies, dial 911.           [facility] complies with applicable Federal civil rights laws and does not discriminate on the basis of race, color, national origin, age, disability, or sex.

## 2017-03-16 ENCOUNTER — LAB VISIT (OUTPATIENT)
Dept: LAB | Facility: HOSPITAL | Age: 22
End: 2017-03-16
Attending: OBSTETRICS & GYNECOLOGY
Payer: MEDICAID

## 2017-03-16 DIAGNOSIS — N89.8 VAGINAL DISCHARGE: ICD-10-CM

## 2017-03-16 LAB — GLUCOSE SERPL-MCNC: 93 MG/DL

## 2017-03-16 PROCEDURE — 82950 GLUCOSE TEST: CPT

## 2017-03-16 PROCEDURE — 36415 COLL VENOUS BLD VENIPUNCTURE: CPT

## 2017-03-17 ENCOUNTER — TELEPHONE (OUTPATIENT)
Dept: OBSTETRICS AND GYNECOLOGY | Facility: CLINIC | Age: 22
End: 2017-03-17

## 2017-03-17 NOTE — TELEPHONE ENCOUNTER
----- Message from Sven Smiley MD sent at 3/16/2017  9:53 PM CDT -----  Normal Ob glucose screen  Please inform patient    RTC as scheduled    Sven Smiley M.D.   OB/GYN

## 2017-03-23 ENCOUNTER — ROUTINE PRENATAL (OUTPATIENT)
Dept: OBSTETRICS AND GYNECOLOGY | Facility: CLINIC | Age: 22
End: 2017-03-23
Payer: MEDICAID

## 2017-03-23 VITALS
BODY MASS INDEX: 26.17 KG/M2 | DIASTOLIC BLOOD PRESSURE: 64 MMHG | SYSTOLIC BLOOD PRESSURE: 108 MMHG | WEIGHT: 147.69 LBS

## 2017-03-23 DIAGNOSIS — Z3A.28 28 WEEKS GESTATION OF PREGNANCY: Primary | ICD-10-CM

## 2017-03-23 DIAGNOSIS — N89.8 VAGINAL DISCHARGE: ICD-10-CM

## 2017-03-23 DIAGNOSIS — Z34.83 ENCOUNTER FOR SUPERVISION OF OTHER NORMAL PREGNANCY IN THIRD TRIMESTER: ICD-10-CM

## 2017-03-23 PROCEDURE — 99212 OFFICE O/P EST SF 10 MIN: CPT | Mod: PBBFAC,PO | Performed by: OBSTETRICS & GYNECOLOGY

## 2017-03-23 PROCEDURE — 99213 OFFICE O/P EST LOW 20 MIN: CPT | Mod: S$PBB,TH,, | Performed by: OBSTETRICS & GYNECOLOGY

## 2017-03-23 PROCEDURE — 99999 PR PBB SHADOW E&M-EST. PATIENT-LVL II: CPT | Mod: PBBFAC,,, | Performed by: OBSTETRICS & GYNECOLOGY

## 2017-03-23 RX ORDER — FLUCONAZOLE 150 MG/1
150 TABLET ORAL ONCE
Qty: 1 TABLET | Refills: 2 | Status: SHIPPED | OUTPATIENT
Start: 2017-03-23 | End: 2017-03-23

## 2017-03-23 NOTE — PROGRESS NOTES
Pain is better,but constant  Vaginal discharge     FHT 136x'  Verified with Doppler.  Prenatal labs reviewed      General Pregnancy  recommendations given  PNV + H2O intake  Quad screen ordered but patient declined screening  FU in 4 weeks  Anatomy US at 20 weeks normal anatomy         Sven Smiley M.D.   OB/GYN

## 2017-04-06 ENCOUNTER — ROUTINE PRENATAL (OUTPATIENT)
Dept: OBSTETRICS AND GYNECOLOGY | Facility: CLINIC | Age: 22
End: 2017-04-06
Payer: MEDICAID

## 2017-04-06 VITALS — DIASTOLIC BLOOD PRESSURE: 70 MMHG | BODY MASS INDEX: 26.2 KG/M2 | WEIGHT: 147.94 LBS | SYSTOLIC BLOOD PRESSURE: 112 MMHG

## 2017-04-06 DIAGNOSIS — Z34.83 ENCOUNTER FOR SUPERVISION OF OTHER NORMAL PREGNANCY IN THIRD TRIMESTER: ICD-10-CM

## 2017-04-06 DIAGNOSIS — Z3A.31 31 WEEKS GESTATION OF PREGNANCY: Primary | ICD-10-CM

## 2017-04-06 PROCEDURE — 99212 OFFICE O/P EST SF 10 MIN: CPT | Mod: PBBFAC,PO | Performed by: OBSTETRICS & GYNECOLOGY

## 2017-04-06 PROCEDURE — 99999 PR PBB SHADOW E&M-EST. PATIENT-LVL II: CPT | Mod: PBBFAC,,, | Performed by: OBSTETRICS & GYNECOLOGY

## 2017-04-06 PROCEDURE — 99213 OFFICE O/P EST LOW 20 MIN: CPT | Mod: TH,S$PBB,, | Performed by: OBSTETRICS & GYNECOLOGY

## 2017-04-11 NOTE — PROGRESS NOTES
o complaints  Vaginal discharge improved      Prenatal labs reviewed      General Pregnancy  recommendations given  PNV + H2O intake  Quad screen offered  but patient declined screening  Anatomy US at 20 weeks normal anatomy     FU in 3w    Sven Smiley M.D.   OB/GYN

## 2017-04-17 ENCOUNTER — TELEPHONE (OUTPATIENT)
Dept: OBSTETRICS AND GYNECOLOGY | Facility: CLINIC | Age: 22
End: 2017-04-17

## 2017-04-17 NOTE — TELEPHONE ENCOUNTER
----- Message from Danica Pace sent at 4/17/2017 11:56 AM CDT -----  Contact: Self [phone]  Patient is calling to talk to nurse concerning she needs a letter to see a dentist she needs dental work done. Please advice

## 2017-04-26 ENCOUNTER — TELEPHONE (OUTPATIENT)
Dept: OBSTETRICS AND GYNECOLOGY | Facility: CLINIC | Age: 22
End: 2017-04-26

## 2017-04-26 NOTE — TELEPHONE ENCOUNTER
----- Message from Yuliet Ghotra sent at 4/26/2017 12:44 PM CDT -----  Contact: [phone]/self  Pt its requesting to speak with you , states she is pregnant and she is having problems. Please advise

## 2017-04-27 ENCOUNTER — ROUTINE PRENATAL (OUTPATIENT)
Dept: OBSTETRICS AND GYNECOLOGY | Facility: CLINIC | Age: 22
End: 2017-04-27
Payer: MEDICAID

## 2017-04-27 VITALS
BODY MASS INDEX: 26.83 KG/M2 | DIASTOLIC BLOOD PRESSURE: 62 MMHG | WEIGHT: 151.44 LBS | SYSTOLIC BLOOD PRESSURE: 102 MMHG

## 2017-04-27 DIAGNOSIS — Z34.83 ENCOUNTER FOR SUPERVISION OF OTHER NORMAL PREGNANCY IN THIRD TRIMESTER: ICD-10-CM

## 2017-04-27 DIAGNOSIS — Z3A.33 33 WEEKS GESTATION OF PREGNANCY: Primary | ICD-10-CM

## 2017-04-27 PROCEDURE — 99213 OFFICE O/P EST LOW 20 MIN: CPT | Mod: TH,S$PBB,, | Performed by: OBSTETRICS & GYNECOLOGY

## 2017-04-27 PROCEDURE — 99212 OFFICE O/P EST SF 10 MIN: CPT | Mod: PBBFAC,PO | Performed by: OBSTETRICS & GYNECOLOGY

## 2017-04-27 PROCEDURE — 99999 PR PBB SHADOW E&M-EST. PATIENT-LVL II: CPT | Mod: PBBFAC,,, | Performed by: OBSTETRICS & GYNECOLOGY

## 2017-04-27 RX ORDER — LEVONORGESTREL 52 MG/1
INTRAUTERINE DEVICE INTRAUTERINE
Refills: 0 | COMMUNITY
Start: 2017-02-07 | End: 2020-08-18

## 2017-04-27 NOTE — PROGRESS NOTES
occasional Vaginal pressure / but no uterine activity     Prenatal labs reviewed      General Pregnancy  recommendations given  PNV + H2O intake  Quad screen offered  but patient declined screening  Anatomy US at 20 weeks normal anatomy     FU in 2w for ANDRÉS Smiley M.D.   OB/GYN

## 2017-05-04 ENCOUNTER — TELEPHONE (OUTPATIENT)
Dept: OBSTETRICS AND GYNECOLOGY | Facility: CLINIC | Age: 22
End: 2017-05-04

## 2017-05-04 NOTE — TELEPHONE ENCOUNTER
----- Message from Sharifa Zhang sent at 5/4/2017 12:07 PM CDT -----  Contact: Self/[phone]  Patient said she needs to speak with you regarding medication. She has very bad congestion, sore throat, coughing. She said she soul not tsleep last night. Please advise

## 2017-05-09 ENCOUNTER — TELEPHONE (OUTPATIENT)
Dept: OBSTETRICS AND GYNECOLOGY | Facility: CLINIC | Age: 22
End: 2017-05-09

## 2017-05-09 ENCOUNTER — HOSPITAL ENCOUNTER (OUTPATIENT)
Facility: HOSPITAL | Age: 22
Discharge: HOME | End: 2017-05-09
Attending: OBSTETRICS & GYNECOLOGY | Admitting: OBSTETRICS & GYNECOLOGY
Payer: MEDICAID

## 2017-05-09 VITALS
TEMPERATURE: 98 F | BODY MASS INDEX: 29.44 KG/M2 | HEIGHT: 60 IN | DIASTOLIC BLOOD PRESSURE: 54 MMHG | HEART RATE: 83 BPM | WEIGHT: 149.94 LBS | SYSTOLIC BLOOD PRESSURE: 90 MMHG | RESPIRATION RATE: 16 BRPM

## 2017-05-09 DIAGNOSIS — R10.9 ABDOMINAL PAIN IN PREGNANCY, THIRD TRIMESTER: ICD-10-CM

## 2017-05-09 DIAGNOSIS — O26.893 ABDOMINAL PAIN IN PREGNANCY, THIRD TRIMESTER: ICD-10-CM

## 2017-05-09 PROBLEM — O26.899 ABDOMINAL PAIN IN PREGNANCY: Status: ACTIVE | Noted: 2017-05-09

## 2017-05-09 PROCEDURE — 99211 OFF/OP EST MAY X REQ PHY/QHP: CPT

## 2017-05-09 NOTE — TELEPHONE ENCOUNTER
----- Message from Yuliet Ghotra sent at 5/9/2017  1:28 PM CDT -----  Contact: [phone]/ self   Pt called stating she is 36 weeks pregnant and she is having a severe pain on her lower abdominal . Please

## 2017-05-09 NOTE — DISCHARGE INSTRUCTIONS
Prenatal Concerns and Teaching    1) Severe headache not relieved with a dose of Tylenol    2) Blurry vision or seeing spots before your eyes    3) Sudden swelling in your face or hands    4) Sudden weight gain in only a few days    5) Severe stomach pains or cramps    6) Vomiting lasting more than 24 hours    7) Fever greater than 100.4 degrees    8) Vaginal bleeding that is more than just spotting    9) Excessive and unusual vaginal discharge    10) A gush or flow of watery fluid from your vagina    11) Significant decrease or absence of baby's movement (starting at 24-36 weeks)    12)  (less than 37 weeks): More than 4 contractions in an hour for 2 hours    13) Term (greater than 37 weeks): Contractions every 5 minutes for 2 hours    14) Increased fluid intake to 8-10 glasses daily    Follow up with MD as scheduled.

## 2017-05-09 NOTE — PLAN OF CARE
1645 Pt  history of AB, and  4 years ago. 35.4 weeks, complaint of pain on her left side that radiated to the front and down her leg. Pain started 2 weeks ago, rates 6/10. Pt denies urinary symptoms or vaginal discharge, oriented to room and changes into gown. Full assessment done, Call light in reach.     1730 Dr Mcclellan updated on pt arrival complaint, irregular ctx every 7-12 minutes, no acels for first 20 minutes, After PO ice water and position to left lateral, acels noted. Orders to perform SVE, if no dilation, pt can be d/c to home on labor precautions, may take tylenol as directed for pain.     1745 SVE closed soft, see pericalm annotations    1750 Reviewed comfort tips, tylenol use, labor precautions, kick counts, Pt given written and verbal d/c instructions. Pt leaves ambulatory to home and can recall all information.

## 2017-05-09 NOTE — IP AVS SNAPSHOT
[facility]  [address]  [place] [address]  Phone: [phone]           Instrucciones de Kelly de Pacientes  Nuestra meta es prepararlo para el éxito. Nancy paquete incluye información sobre gastelum condición, medicamentos e instrucciones para cuidados en el hogar. [place] le ayudará a cuidarse y prevenir la necesidad de volver al hospital.     Por favor, hable con gastelum enfermero o enfermera si tiene alguna pregunta..     Hay muchos detalles a recordar cuando se prepara para salir del hospital. [place] es lo que necesita hacer:    1. [place] gastelum medicina. Si usted tiene [place] receta, revise la lista de medicamentos en las siguientes páginas. Es posible que tenga nuevos medicamentos para recoger en la farmacia y otros que tendrá que dejar de rayshawn. Revise las instrucciones sobre cómo y cuándo rayshawn abel medicamentos. Consulte con el médico o el enfermeras si no está seguro de qué hacer.    2. Ir a abel citas de seguimiento. La información específica de seguimiento aparece en las siguientes páginas. Usted puede ser contactado por [place] enfermera o proveedor clínico sobre las próximas citas. Estar seguro de que tiene todos los números de teléfono para comunicarse si es necesario. Por favor, póngase en contacto con la oficina de gastelum profesional médico si no puede hacer [place] thalia.     3. Esté atento a señales de advertencia. El médico o la enfermera le dará señales de alarma detallados que debe observar y cuándo llamar para la ayuda. Estas instrucciones también pueden incluir información educativa acerca de gastelum condición. Si usted experimenta cualquiera de las señales de advertencia para gastelum manohar, llame a gastelum médico.             [facility]    Si gastelum médico no le ha indicado a lo contrário, por favor   contactar a [facility] [place], nuestra línea de cuidado de enfermeras está disponible para asistirle 24/7.    [phone] (servicio gratUNM Children's Psychiatric Centero)    Enfermeras registradas de [facility] pueden ayudarle a reservar [place] thalia,  proveer educación para la manohar, asesoría clínica, y otros servicios de asesoramiento.                  ** Verificar la lista de medicamentos es correcta y está actualizada. Llevar esto con usted en gloria de emergencia. Si carmela medicamentos rubin cambiado, por favor notifique a gastelum proveedor de atención médica.             Lista de medicamentos      CONSULTE con gastelum médico sobre estos medicamentos        Additional Info                      MIRENA 20 mcg/24 hr (5 years) IUD   Recargas:  0   Medicamento genérico:  levonorgestrel    Instrucciones:  TO BE INSERTED ONE TIME BY PRESCRIBER. ROUTE INTRAUTERINE.     Begin Date    AM    Noon    PM    Bedtime       PNV #26-iron ps-folic acid-dha 29 mg iron- 1 mg-200 mg Cap   También conocido luis antonio:  VITAFOL-ONE   Cantidad:  60 capsule   Recargas:  5   Dosis:  1 tablet    Instrucciones:  Take 1 tablet by mouth once daily.     Begin Date    AM    Noon    PM    Bedtime                  Por favor traiga a todos las citas de seguimiento:    1. Maty copia de las instrucciones de maryam.  2. Todos los medicamentos que está tomando zen momento, en carmela envases originales.  3. Identificación y tarjeta de seguro.    Por favor llegue 15 minutos antes de la hora de la thalia.    Por favor llame con 24 horas de antelación si tiene que cambiar gastelum thalia y / o tiempo.        Carmela Citas Programadas     May 16, 2017  1:15 PM CDT   Routine Prenatal Visit with MD [place] [facility] ([facility])    [address], [address]  [address]th Floor [place] [address] [phone]                  Instrucciones a julissa de maryam       Prenatal Concerns and Teaching    1) Severe headache not relieved with a dose of Tylenol    2) Blurry vision or seeing spots before your eyes    3) Sudden swelling in your face or hands    4) Sudden weight gain in only a few days    5) Severe stomach pains or cramps    6) Vomiting lasting more than 24 hours    7) Fever greater than 100.4 degrees    8) Vaginal  bleeding that is more than just spotting    9) Excessive and unusual vaginal discharge    10) A gush or flow of watery fluid from your vagina    11) Significant decrease or absence of baby's movement (starting at 24-36 weeks)    12)  (less than 37 weeks): More than 4 contractions in an hour for 2 hours    13) Term (greater than 37 weeks): Contractions every 5 minutes for 2 hours    14) Increased fluid intake to 8-10 glasses daily    Follow up with MD as scheduled.      Referencias/Adjuntos de maryam     KICK COUNTS (Citizen of [place])    FALSE LABOR (Citizen of [place])    LABOR AND CHILDBIRTH: ACTIVE LABOR  (Citizen of [place])        Información de Admisión     Fecha y hora Proveedor Departamento [facility]    2017  4:44 PM Sven Smiley MD [facility] [id number]      Los proveedores de cuidado     Personal Médico Rol [facility] Teléfono principal de la oficina    Sven Smiley MD Attending Provider Obstetrics and Gynecology [phone]      Carmela signos vitales erica     PS Pulso Temperatura Resp [place]    90/54 83 98.2 °F (36.8 °C) (Oral) 16 5' (1.524 m) 68 kg (149 lb 14.6 oz)    Ultima menstruación BMI (IMC)                2016 29.28 kg/m2          Recent Lab Values     No lab values to display.      Alergias     A partir del:  2017        No Known Allergies      Directiva Anticipada     Maty directiva anticipada es un documento que, en gloria de que ya no capaz de hacer decisiones por sí mismo, le dice a gastelum equipo médico qué tipo de tratamiento quiere o no quiere recibir, o que le gustaría rayshawn esas decisiones para usted . Si actualmente no tiene maty directiva anticipada, [facility] le anima a crear raghu. Para más información llame al: [phone] ([phone]), o entrando en [email or web address].        Language Assistance Services     ATTENTION: Language assistance services are available, free of charge. Please call [phone].      ATENCIÓN: [place],  tiene a gastelum disposición servicios gratuitos de asistencia lingüística. Llame al [phone].     [facility] Ý: N?u b?n nói Ti?ng Vi?t, có các d?ch v? h? tr? ngôn ng? mi?n phí dành cho b?n. G?i s? [phone].        Registrarse para [facility]     La activación de gastelum cuenta [facility] es tan fácil luis antonio 1-2-3!    1) Ir a [email or web address], seleccione Registrarse Ahora, meter el código de activación y gastelum fecha de nacimiento, y seleccione Próximo.    [id number]  Expires: 6/23/2017  5:30 PM      2) Crear un nombre de usuario y contraseña para usar cuando se visita [facility] en el futuro y selecciona [place] pregunta de seguridad en gloria de que pierda gastelum contraseña y seleccione Próximo.    3) Introduzca gastelum dirección de correo electrónico y cecil clic en Registrarse!    Información Adicional  Si tiene alguna pregunta, por favor, e-mail [email or web address] o llame al [phone] para hablar con nuestro personal. Recuerde, [facility] no debe ser usada para necesidades urgentes. En gloria de emergencia médica, llame al [phone].         [facility] cumple con las leyes federales aplicables de derechos civiles y no discrimina por motivos de puneet, color, origen nacional, edad, discapacidad, o sexo.                        [facility]  [address]  [place] [address]  Phone: [phone]           Patient Discharge Instructions   Our goal is to set you up for success. This packet includes information on your condition, medications, and your home care.  It will help you care for yourself to prevent having to return to the hospital.     Please ask your nurse if you have any questions.      There are many details to remember when preparing to leave the hospital. Here is what you will need to do:    1. Take your medicine. If you are prescribed medications, review your Medication List on the following pages. You may have new medications to  at the pharmacy and others that you'll need to stop taking. Review the  instructions for how and when to take your medications. Talk with your doctor or nurses if you are unsure of what to do.     2. Go to your follow-up appointments. Specific follow-up information is listed in the following pages. Your may be contacted by a nurse or clinical provider about future appointments. Be sure we have all of the phone numbers to reach you. Please contact your provider's office if you are unable to make an appointment.     3. Watch for warning signs. Your doctor or nurse will give you detailed warning signs to watch for and when to call for assistance. These instructions may also include educational information about your condition. If you experience any of warning signs to your health, call your doctor.           [facility]  Unless otherwise directed by your provider, please   contact [facility], our nurse care line   that is available for 24/7 assistance.     [phone] (toll-free)     Registered nurses in the [facility]   provide: appointment scheduling, clinical advisement, health education, and other advisory services.              ** Verificar la lista de medicamentos es correcta y está actualizada. Llevar esto con usted en gloria de emergencia. Si abel medicamentos rubin cambiado, por favor notifique a gastelum proveedor de atención médica.             Medication List      ASK your doctor about these medications        Additional Info                      MIRENA 20 mcg/24 hr (5 years) IUD   Refills:  0   Generic drug:  levonorgestrel    Instructions:  TO BE INSERTED ONE TIME BY PRESCRIBER. ROUTE INTRAUTERINE.     Begin Date    AM    Noon    PM    Bedtime       PNV #26-iron ps-folic acid-dha 29 mg iron- 1 mg-200 mg Cap   Commonly known as:  VITAFOL-ONE   Quantity:  60 capsule   Refills:  5   Dose:  1 tablet    Instructions:  Take 1 tablet by mouth once daily.     Begin Date    AM    Noon    PM    Bedtime                  Por favor traiga a todos las citas de  seguimiento:    1. Maty copia de las instrucciones de maryam.  2. Todos los medicamentos que está tomando zen momento, en abel envases originales.  3. Identificación y tarjeta de seguro.    Por favor llegue 15 minutos antes de la hora de la thalia.    Por favor llame con 24 horas de antelación si tiene que cambiar gastelum thalia y / o tiempo.        Your Scheduled Appointments     May 16, 2017  1:15 PM CDT   Routine Prenatal Visit with MD [place] [facility] ([facility])    [address] [place] [address], [address]  [address]th [address] [facility]  [place] [address] [phone]                  Discharge Instructions       Prenatal Concerns and Teaching    1) Severe headache not relieved with a dose of Tylenol    2) Blurry vision or seeing spots before your eyes    3) Sudden swelling in your face or hands    4) Sudden weight gain in only a few days    5) Severe stomach pains or cramps    6) Vomiting lasting more than 24 hours    7) Fever greater than 100.4 degrees    8) Vaginal bleeding that is more than just spotting    9) Excessive and unusual vaginal discharge    10) A gush or flow of watery fluid from your vagina    11) Significant decrease or absence of baby's movement (starting at 24-36 weeks)    12)  (less than 37 weeks): More than 4 contractions in an hour for 2 hours    13) Term (greater than 37 weeks): Contractions every 5 minutes for 2 hours    14) Increased fluid intake to 8-10 glasses daily    Follow up with MD as scheduled.      Discharge References/Attachments     KICK COUNTS ([place])    FALSE LABOR ([place])    LABOR AND CHILDBIRTH: ACTIVE LABOR  ([place])        Admission Information     Date & Time Provider Department CSN    2017  4:44 PM Sven Smiley MD [facility] [id number]      Care Providers     Provider Role Specialty Primary office phone    Sven Smiley MD Attending Provider Obstetrics and Gynecology [phone]      Your Vitals Were     BP Pulse Temp Resp  Height Weight    90/54 83 98.2 °F (36.8 °C) (Oral) 16 5' (1.524 m) 68 kg (149 lb 14.6 oz)    Last Period BMI                08/21/2016 29.28 kg/m2          Recent Lab Values     No lab values to display.      Allergies as of 5/9/2017     No Known Allergies      Advance Directives     An advance directive is a document which, in the event you are no longer able to make decisions for yourself, tells your healthcare team what kind of treatment you do or do not want to receive, or who you would like to make those decisions for you.  If you do not currently have an advance directive, [facility] encourages you to create one.  For more information call:  [phone]), [phone] ([phone]),  or log on to [email or web address].        Language Assistance Services     ATTENTION: Language assistance services are available, free of charge. Please call [phone].      ATENCIÓN: Si habla español, tiene a gastelum disposición servicios gratuitos de asistencia lingüística. Llame al [phone].     CHÚ Ý: N?u b?n nói Ti?ng Vi?t, có các d?ch v? h? tr? ngôn ng? mi?n phí dành cho b?n. G?i s? [phone].        [facility] Sign-Up     Activating your [facility] account is as easy as 1-2-3!     1) Visit [email or web address], select Sign Up Now, enter this activation code and your date of birth, then select Next.  [email or web address]  Expires: 6/23/2017  5:30 PM      2) Create a username and password to use when you visit [facility] in the future and select a security question in case you lose your password and select Next.    3) Enter your e-mail address and click Sign Up!    Additional Information  If you have questions, please e-mail [email or web address] or call [phone] to talk to our [facility] staff. Remember, [facility] is NOT to be used for urgent needs. For medical emergencies, dial 911.          [facility] complies with applicable Federal civil rights laws and does not discriminate on the  basis of race, color, national origin, age, disability, or sex.

## 2017-05-09 NOTE — TELEPHONE ENCOUNTER
Pt complaining that she only felt the baby move one time since 7 this morning. Advised pt to go to L&D

## 2017-05-11 ENCOUNTER — HOSPITAL ENCOUNTER (OUTPATIENT)
Facility: HOSPITAL | Age: 22
Discharge: HOME | End: 2017-05-11
Attending: OBSTETRICS & GYNECOLOGY | Admitting: OBSTETRICS & GYNECOLOGY
Payer: MEDICAID

## 2017-05-11 VITALS
DIASTOLIC BLOOD PRESSURE: 58 MMHG | OXYGEN SATURATION: 100 % | SYSTOLIC BLOOD PRESSURE: 101 MMHG | BODY MASS INDEX: 27.59 KG/M2 | HEART RATE: 80 BPM | WEIGHT: 149.94 LBS | HEIGHT: 62 IN

## 2017-05-11 DIAGNOSIS — O26.899 ABDOMINAL PAIN AFFECTING PREGNANCY: ICD-10-CM

## 2017-05-11 DIAGNOSIS — R10.9 ABDOMINAL PAIN AFFECTING PREGNANCY: ICD-10-CM

## 2017-05-11 PROCEDURE — 99211 OFF/OP EST MAY X REQ PHY/QHP: CPT | Mod: 25

## 2017-05-11 PROCEDURE — 59025 FETAL NON-STRESS TEST: CPT

## 2017-05-11 RX ORDER — BUTORPHANOL TARTRATE 1 MG/ML
1 INJECTION INTRAMUSCULAR; INTRAVENOUS ONCE
Status: DISCONTINUED | OUTPATIENT
Start: 2017-05-11 | End: 2017-05-12 | Stop reason: HOSPADM

## 2017-05-11 RX ORDER — ACETAMINOPHEN 500 MG
500 TABLET ORAL EVERY 6 HOURS PRN
Status: DISCONTINUED | OUTPATIENT
Start: 2017-05-11 | End: 2017-05-12 | Stop reason: HOSPADM

## 2017-05-11 RX ORDER — ONDANSETRON 8 MG/1
8 TABLET, ORALLY DISINTEGRATING ORAL EVERY 8 HOURS PRN
Status: DISCONTINUED | OUTPATIENT
Start: 2017-05-11 | End: 2017-05-12 | Stop reason: HOSPADM

## 2017-05-11 RX ORDER — PROMETHAZINE HYDROCHLORIDE 25 MG/ML
25 INJECTION, SOLUTION INTRAMUSCULAR; INTRAVENOUS ONCE
Status: DISCONTINUED | OUTPATIENT
Start: 2017-05-11 | End: 2017-05-12 | Stop reason: HOSPADM

## 2017-05-11 NOTE — IP AVS SNAPSHOT
[facility]  [address]  [place] [address]  Phone: [phone]           Instrucciones de Kelly de Pacientes  Nuestra meta es prepararlo para el éxito. Nancy paquete incluye información sobre gastelum condición, medicamentos e instrucciones para cuidados en el hogar. [place] le ayudará a cuidarse y prevenir la necesidad de volver al hospital.     Por favor, hable con gastelum enfermero o enfermera si tiene alguna pregunta..     Hay muchos detalles a recordar cuando se prepara para salir del hospital. [place] es lo que necesita hacer:    1. [place] gastelum medicina. Si usted tiene [place] receta, revise la lista de medicamentos en las siguientes páginas. Es posible que tenga nuevos medicamentos para recoger en la farmacia y otros que tendrá que dejar de rayshawn. Revise las instrucciones sobre cómo y cuándo rayshawn bael medicamentos. Consulte con el médico o el enfermeras si no está seguro de qué hacer.    2. Ir a abel citas de seguimiento. La información específica de seguimiento aparece en las siguientes páginas. Usted puede ser contactado por [place] enfermera o proveedor clínico sobre las próximas citas. Estar seguro de que tiene todos los números de teléfono para comunicarse si es necesario. Por favor, póngase en contacto con la oficina de gastelum profesional médico si no puede hacer [place] thalia.     3. Esté atento a señales de advertencia. El médico o la enfermera le dará señales de alarma detallados que debe observar y cuándo llamar para la ayuda. Estas instrucciones también pueden incluir información educativa acerca de gastelum condición. Si usted experimenta cualquiera de las señales de advertencia para gastelum manohar, llame a gastelum médico.             [facility]    Si gastelum médico no le ha indicado a lo contrário, por favor   contactar a [facility] [place], nuestra línea de cuidado de enfermeras está disponible para asistirle 24/7.    [phone] (servicio [facility] Cent[facility])    Enfermeras registradas de [facility] pueden ayudarle a reservar [place] thalia,  proveer educación para la manohar, asesoría clínica, y otros servicios de asesoramiento.                  ** Verificar la lista de medicamentos es correcta y está actualizada. Llevar esto con usted en gloria de emergencia. Si carmela medicamentos rubin cambiado, por favor notifique a gastelum proveedor de atención médica.             Lista de medicamentos      CONSULTE con gastelum médico sobre estos medicamentos        Additional Info                      MIRENA 20 mcg/24 hr (5 years) IUD   Recargas:  0   Medicamento genérico:  levonorgestrel    Instrucciones:  TO BE INSERTED ONE TIME BY PRESCRIBER. ROUTE INTRAUTERINE.     Begin Date    AM    Noon    PM    Bedtime       PNV #26-iron ps-folic acid-dha 29 mg iron- 1 mg-200 mg Cap   También conocido luis antonio:  VITAFOL-ONE   Cantidad:  60 capsule   Recargas:  5   Dosis:  1 tablet    Instrucciones:  Take 1 tablet by mouth once daily.     Begin Date    AM    Noon    PM    Bedtime                  Por favor traiga a todos las citas de seguimiento:    1. [place] copia de las instrucciones de maryam.  2. Todos los medicamentos que está tomando zen momento, en carmela envases originales.  3. Identificación y tarjeta de seguro.    Por favor llegue 15 minutos antes de la hora de la thalia.    Por favor llame con 24 horas de antelación si tiene que cambiar gastelum thalia y / o tiempo.        Carmela Citas Programadas     May 16, 2017  1:15 PM CDT   Routine Prenatal Visit with MD [place] - [facility]N ([facility])    [address], [address]  5th Floor [place] [address] [phone]                  Instrucciones a julissa de maryam       1 Dolor de radha intenso que no se richard con [place] dosis de Tylenol.    2 Vision borrosa o russell manchas klaudia de carmela ojos.    3 Ninchazon repentino en la ursula o shahid.     4 Aumento de peso en solo unos pocos mims.    5 Dolor de estomago o calambres.    6 Vomito que dura mas de 24 horas.    7 Fiebre mas de 100.4 grados.    8 Sangrado vaginal que es algo mas  que manonar.    9 Secrecion vaginal excesiva e in usual.    10 Un flujo de liquido acvoso de la vagina.    11 Avsencia de movmiento del nazia significohiva comenzando en 24-36 semanas.    12 Menos de 37 semanas: mas de 4 contracciones en [place] hora por 2 horas    13 Mas de 37 semanas: contracciones coda 5 minutos por 2 horas.    14 Silver 8-10 vasos.    Sequimiento con gastelum medico cadacita.           Información de Admisión     Fecha y hora Proveedor Departamento CSN    5/11/2017  7:45 PM Sven Smiley MD [facility] [id number]      Los proveedores de cuidado     Personal Médico Rol [facility] Teléfono principal de la oficina    Sven Smiley MD Attending Provider Obstetrics and Gynecology [phone]      Carmela signos vitales erica     Ultima menstruación                   08/21/2016           Recent Lab Values     No lab values to display.      Alergias     A partir del:  5/11/2017        No Known Allergies      Directiva Anticipada     [place] directiva anticipada es un documento que, en gloria de que ya no capaz de hacer decisiones por sí mismo, le dice a gastelum equipo médico qué tipo de tratamiento quiere o no quiere recibir, o que le gustaría silver esas decisiones para usted . Si actualmente no tiene [place] directiva anticipada, [facility] le anima a crear raghu. Para más información llame al: [phone] ([phone]), o entrando en [email or web address].        Language Assistance Services     ATTENTION: Language assistance services are available, free of charge. Please call [phone].      ATENCIÓN: Si habla español, tiene a gastelum disposición servicios gratuitos de asistencia lingüística. Llame al [phone].     CHÚ Ý: N?u b?n nói Ti?ng Vi?t, có các d?ch v? h? tr? ngôn ng? mi?n phí dành cho b?n. G?i s? [phone].        Registrarse para [facility]     La activación de gastelum cuenta [facility] es tan fácil luis antonio 1-2-3!    1) Ir a [email or web address], seleccione Registrarse  Ahora, meter el código de activación y gastelum fecha de nacimiento, y seleccione Próximo.    [id number]  Expires: 6/23/2017  5:30 PM      2) Crear un nombre de usuario y contraseña para usar cuando se visita [facility] en el futuro y selecciona [place] pregunta de seguridad en gloria de que pierda gastelum contraseña y seleccione Próximo.    3) Introduzca gastelum dirección de correo electrónico y cecil clic en Registrarse!    Información Adicional  Si tiene alguna pregunta, por favor, e-mail [email or web address] o llame al [phone] para hablar con nuestro personal. Recuerde, Aparnatrace no debe ser usada para necesidades urgentes. En gloria de emergencia médica, llame al 911.         [facility] cumple con las leyes federales aplicables de derechos civiles y no discrimina por motivos de puneet, color, origen nacional, edad, discapacidad, o sexo.                        [facility] [place]  [address]  [place] [address]  Phone: [phone]           Patient Discharge Instructions   Our goal is to set you up for success. This packet includes information on your condition, medications, and your home care.  It will help you care for yourself to prevent having to return to the hospital.     Please ask your nurse if you have any questions.      There are many details to remember when preparing to leave the hospital. Here is what you will need to do:    1. Take your medicine. If you are prescribed medications, review your Medication List on the following pages. You may have new medications to  at the pharmacy and others that you'll need to stop taking. Review the instructions for how and when to take your medications. Talk with your doctor or nurses if you are unsure of what to do.     2. Go to your follow-up appointments. Specific follow-up information is listed in the following pages. Your may be contacted by a nurse or clinical provider about future appointments. Be sure we have all of the phone numbers to  reach you. Please contact your provider's office if you are unable to make an appointment.     3. Watch for warning signs. Your doctor or nurse will give you detailed warning signs to watch for and when to call for assistance. These instructions may also include educational information about your condition. If you experience any of warning signs to your health, call your doctor.           [facility]  Unless otherwise directed by your provider, please   contact [facility], our nurse care line   that is available for 24/7 assistance.     [phone] (toll-free)     Registered nurses in the [facility]   provide: appointment scheduling, clinical advisement, health education, and other advisory services.              ** Verificar la lista de medicamentos es correcta y está actualizada. Llevar esto con usted en gloria de emergencia. Si abel medicamentos rubin cambiado, por favor notifique a gastelum proveedor de atención médica.             Medication List      ASK your doctor about these medications        Additional Info                      MIRENA 20 mcg/24 hr (5 years) IUD   Refills:  0   Generic drug:  levonorgestrel    Instructions:  TO BE INSERTED ONE TIME BY PRESCRIBER. ROUTE INTRAUTERINE.     Begin Date    AM    Noon    PM    Bedtime       PNV #26-iron ps-folic acid-dha 29 mg iron- 1 mg-200 mg Cap   Commonly known as:  VITAFOL-ONE   Quantity:  60 capsule   Refills:  5   Dose:  1 tablet    Instructions:  Take 1 tablet by mouth once daily.     Begin Date    AM    Noon    PM    Bedtime                  Por favor traiga a todos las citas de seguimiento:    1. Maty copia de las instrucciones de maryam.  2. Todos los medicamentos que está tomando zen momento, en abel envases originales.  3. Identificación y tarjeta de seguro.    Por favor llegue 15 minutos antes de la hora de la thalia.    Por favor llame con 24 horas de antelación si tiene que cambiar gastelum thalia y / o tiempo.        Your Scheduled Appointments      May 16, 2017  1:15 PM CDT   Routine Prenatal Visit with MD [place] - [facility] ([facility])    [address] [place], [address]  5th [address] [facility]  [place] [address] [phone]                  Discharge Instructions       1 Dolor de radha intenso que no se richard con [place] dosis de Tylenol.    2 Vision borrosa o russell manchas klaudia de abel ojos.    3 Ninchazon repentino en la ursula o shahid.     4 Aumento de peso en solo unos pocos mims.    5 Dolor de estomago o calambres.    6 Vomito que dura mas de 24 horas.    7 Fiebre mas de 100.4 grados.    8 Sangrado vaginal que es algo mas que manonar.    9 Secrecion vaginal excesiva e in usual.    10 Un flujo de liquido acvoso de la vagina.    11 Avsencia de movmiento del nazia significohiva comenzando en 24-36 semanas.    12 Menos de 37 semanas: mas de 4 contracciones en [place] hora por 2 horas    13 Mas de 37 semanas: contracciones coda 5 minutos por 2 horas.    14 Silver 8-10 vasos.    Sequimiento con gastelum medico cadacita.           Admission Information     Date & Time Provider Department [facility]    5/11/2017  7:45 PM Sven Smiley MD [facility] [id number]      Care Providers     Provider Role Specialty Primary office phone    Sven Smiley MD Attending Provider Obstetrics and Gynecology [phone]      Your Vitals Were     Last Period                   08/21/2016           Recent Lab Values     No lab values to display.      Allergies as of 5/11/2017     No Known Allergies      Advance Directives     An advance directive is a document which, in the event you are no longer able to make decisions for yourself, tells your healthcare team what kind of treatment you do or do not want to receive, or who you would like to make those decisions for you.  If you do not currently have an advance directive, [facility] encourages you to create one.  For more information call:  [phone]), [phone] ([phone]),  or log  on to [email or web address].        Language Assistance Services     ATTENTION: Language assistance services are available, free of charge. Please call [phone].      ATENCIÓN: Si naveed groves, tiene a gastelum disposición servicios gratuitos de asistencia lingüística. Llame al [phone].     CHÚ Ý: N?u b?n nói Ti?ng Vi?t, có các d?ch v? h? tr? ngôn ng? mi?n phí dành cho b?n. G?i s? [phone].        [facility] Sign-Up     Activating your [facility] account is as easy as 1-2-3!     1) Visit [email or web address], select Sign Up Now, enter this activation code and your date of birth, then select Next.  [email or web address]  Expires: 6/23/2017  5:30 PM      2) Create a username and password to use when you visit [facility] in the future and select a security question in case you lose your password and select Next.    3) Enter your e-mail address and click Sign Up!    Additional Information  If you have questions, please e-mail [email or web address] or call [phone] to talk to our [facility] staff. Remember, [facility] is NOT to be used for urgent needs. For medical emergencies, dial 911.          [facility] complies with applicable Federal civil rights laws and does not discriminate on the basis of race, color, national origin, age, disability, or sex.

## 2017-05-12 ENCOUNTER — TELEPHONE (OUTPATIENT)
Dept: OBSTETRICS AND GYNECOLOGY | Facility: CLINIC | Age: 22
End: 2017-05-12

## 2017-05-12 NOTE — DISCHARGE INSTRUCTIONS
1 Dolor de radha intenso que no se richard con [place] dosis de Tylenol.    2 Vision borrosa o russell manchas klaudia de abel ojos.    3 Ninchazon repentino en la ursula o shahid.     4 Aumento de peso en solo unos pocos mims.    5 Dolor de estomago o calambres.    6 Vomito que dura mas de 24 horas.    7 Fiebre mas de 100.4 grados.    8 Sangrado vaginal que es algo mas que manonar.    9 Secrecion vaginal excesiva e in usual.    10 Un flujo de liquido acvoso de la vagina.    11 Avsencia de movmiento del nazia significohiva comenzando en 24-36 semanas.    12 Menos de 37 semanas: mas de 4 contracciones en [place] hora por 2 horas    13 Mas de 37 semanas: contracciones coda 5 minutos por 2 horas.    14 Silver 8-10 vasos.    Sequimiento con gastelum medico cadacita.

## 2017-05-12 NOTE — PLAN OF CARE
1955 Pt arrived to unit via wheelchair with sister interpreting, states pt is in a great deal of pain and having contration pain every five minutes for the last hour and half. Pt taken to triage 1, pt crying says cant move, assisted into gown and into bed SVE done, EFM applied after pt voided in BR. Assessment done questions from pt and family answered. Plan of care discussed and through family pt voiced understanding.  1958 Spoke with Dr Smiley re pts arrival and contraction pattern, SVE  And FH-orders received.  2001 Pt informed of Drs orders, pt refusing IV and medication wishes to go home.  2015 Dr Smiley notified re pts refusal of IV,and medication and desires to go home-orders that pt may go home.  2049 Discharge instructions given, pt voices understanding.  2100 Pt discharged walking accompanied by family.

## 2017-05-12 NOTE — TELEPHONE ENCOUNTER
----- Message from Yuliet Ghotra sent at 5/11/2017 11:36 AM CDT -----  Contact: [phone]/ self   Pt has an appointment on 05/16/17 at 1:15 pm . Pt states she can't make it at that time . Pt  Would like to change the appointment to 2:00 pm . Please advise

## 2017-05-16 ENCOUNTER — ROUTINE PRENATAL (OUTPATIENT)
Dept: OBSTETRICS AND GYNECOLOGY | Facility: CLINIC | Age: 22
End: 2017-05-16
Payer: MEDICAID

## 2017-05-16 VITALS — SYSTOLIC BLOOD PRESSURE: 104 MMHG | BODY MASS INDEX: 28.35 KG/M2 | WEIGHT: 155 LBS | DIASTOLIC BLOOD PRESSURE: 68 MMHG

## 2017-05-16 DIAGNOSIS — Z34.83 ENCOUNTER FOR SUPERVISION OF OTHER NORMAL PREGNANCY IN THIRD TRIMESTER: ICD-10-CM

## 2017-05-16 DIAGNOSIS — Z3A.36 36 WEEKS GESTATION OF PREGNANCY: Primary | ICD-10-CM

## 2017-05-16 PROCEDURE — 99999 PR PBB SHADOW E&M-EST. PATIENT-LVL II: CPT | Mod: PBBFAC,,, | Performed by: OBSTETRICS & GYNECOLOGY

## 2017-05-16 PROCEDURE — 99213 OFFICE O/P EST LOW 20 MIN: CPT | Mod: TH,S$PBB,, | Performed by: OBSTETRICS & GYNECOLOGY

## 2017-05-16 PROCEDURE — 87081 CULTURE SCREEN ONLY: CPT

## 2017-05-16 PROCEDURE — 99212 OFFICE O/P EST SF 10 MIN: CPT | Mod: PBBFAC,PO | Performed by: OBSTETRICS & GYNECOLOGY

## 2017-05-16 NOTE — PROGRESS NOTES
no uterine activity since last visit to ED       Prenatal labs reviewed      General Pregnancy  recommendations given  PNV + H2O intake  Quad screen offered  but patient declined screening  Anatomy US at 20 weeks normal anatomy     FU in 1 w     Sven Smiley M.D.   OB/GYN

## 2017-05-18 ENCOUNTER — TELEPHONE (OUTPATIENT)
Dept: OBSTETRICS AND GYNECOLOGY | Facility: CLINIC | Age: 22
End: 2017-05-18

## 2017-05-18 NOTE — TELEPHONE ENCOUNTER
Spoke with patient, rescheduled appointments that were scheduled incorrectly. Sent out letter with appointment information.

## 2017-05-19 ENCOUNTER — TELEPHONE (OUTPATIENT)
Dept: OBSTETRICS AND GYNECOLOGY | Facility: CLINIC | Age: 22
End: 2017-05-19

## 2017-05-19 LAB — BACTERIA SPEC AEROBE CULT: NORMAL

## 2017-05-19 NOTE — TELEPHONE ENCOUNTER
----- Message from Sven Smiley MD sent at 5/19/2017  8:54 AM CDT -----  GBBS culture is negative  Please inform patient    RTC as scheduled     Sven Smiley M.D.   OB/GYN

## 2017-05-25 ENCOUNTER — ROUTINE PRENATAL (OUTPATIENT)
Dept: OBSTETRICS AND GYNECOLOGY | Facility: CLINIC | Age: 22
End: 2017-05-25
Payer: MEDICAID

## 2017-05-25 ENCOUNTER — OFFICE VISIT (OUTPATIENT)
Dept: OBSTETRICS AND GYNECOLOGY | Facility: CLINIC | Age: 22
End: 2017-05-25
Payer: MEDICAID

## 2017-05-25 VITALS — WEIGHT: 157.88 LBS | BODY MASS INDEX: 28.87 KG/M2

## 2017-05-25 DIAGNOSIS — Z36.89 ENCOUNTER FOR ULTRASOUND TO CHECK FETAL GROWTH: Primary | ICD-10-CM

## 2017-05-25 DIAGNOSIS — Z3A.36 36 WEEKS GESTATION OF PREGNANCY: ICD-10-CM

## 2017-05-25 DIAGNOSIS — Z34.83 ENCOUNTER FOR SUPERVISION OF OTHER NORMAL PREGNANCY IN THIRD TRIMESTER: ICD-10-CM

## 2017-05-25 DIAGNOSIS — Z3A.39 39 WEEKS GESTATION OF PREGNANCY: Primary | ICD-10-CM

## 2017-05-25 PROCEDURE — 76816 OB US FOLLOW-UP PER FETUS: CPT | Mod: 26,S$PBB,, | Performed by: OBSTETRICS & GYNECOLOGY

## 2017-05-25 PROCEDURE — 99212 OFFICE O/P EST SF 10 MIN: CPT | Mod: PBBFAC,PO | Performed by: OBSTETRICS & GYNECOLOGY

## 2017-05-25 PROCEDURE — 99999 PR PBB SHADOW E&M-EST. PATIENT-LVL II: CPT | Mod: PBBFAC,,, | Performed by: OBSTETRICS & GYNECOLOGY

## 2017-05-25 PROCEDURE — 99213 OFFICE O/P EST LOW 20 MIN: CPT | Mod: 25,TH,S$PBB, | Performed by: OBSTETRICS & GYNECOLOGY

## 2017-05-25 RX ORDER — IBUPROFEN 200 MG
800 TABLET ORAL EVERY 8 HOURS
Status: CANCELLED | OUTPATIENT
Start: 2017-05-26

## 2017-05-25 RX ORDER — KETOROLAC TROMETHAMINE 30 MG/ML
30 INJECTION, SOLUTION INTRAMUSCULAR; INTRAVENOUS EVERY 6 HOURS
Status: CANCELLED | OUTPATIENT
Start: 2017-05-25 | End: 2017-05-26

## 2017-05-25 RX ORDER — MISOPROSTOL 100 UG/1
600 TABLET ORAL
Status: CANCELLED | OUTPATIENT
Start: 2017-05-25

## 2017-05-25 RX ORDER — SODIUM CHLORIDE, SODIUM LACTATE, POTASSIUM CHLORIDE, CALCIUM CHLORIDE 600; 310; 30; 20 MG/100ML; MG/100ML; MG/100ML; MG/100ML
INJECTION, SOLUTION INTRAVENOUS CONTINUOUS
Status: CANCELLED | OUTPATIENT
Start: 2017-05-25

## 2017-05-25 RX ORDER — ONDANSETRON 4 MG/1
8 TABLET, ORALLY DISINTEGRATING ORAL EVERY 8 HOURS PRN
Status: CANCELLED | OUTPATIENT
Start: 2017-05-25

## 2017-05-25 RX ORDER — TERBUTALINE SULFATE 1 MG/ML
0.25 INJECTION SUBCUTANEOUS
Status: CANCELLED | OUTPATIENT
Start: 2017-05-25

## 2017-05-25 RX ORDER — MISOPROSTOL 100 MCG
50 TABLET ORAL EVERY 4 HOURS
Status: CANCELLED | OUTPATIENT
Start: 2017-05-25 | End: 2017-05-26

## 2017-05-25 NOTE — PROGRESS NOTES
Prenatal labs reviewed      General Pregnancy  recommendations given  PNV + H2O intake  Quad screen offered  but patient declined screening  Anatomy US at 20 weeks normal anatomy     Plan = Labor induction next Thursday 1000pm      Sven Smiley M.D.   OB/GYN

## 2017-05-30 ENCOUNTER — TELEPHONE (OUTPATIENT)
Dept: OBSTETRICS AND GYNECOLOGY | Facility: CLINIC | Age: 22
End: 2017-05-30

## 2017-05-30 ENCOUNTER — ANESTHESIA EVENT (OUTPATIENT)
Dept: OBSTETRICS AND GYNECOLOGY | Facility: HOSPITAL | Age: 22
End: 2017-05-30
Payer: MEDICAID

## 2017-05-30 ENCOUNTER — ANESTHESIA (OUTPATIENT)
Dept: OBSTETRICS AND GYNECOLOGY | Facility: HOSPITAL | Age: 22
End: 2017-05-30
Payer: MEDICAID

## 2017-05-30 ENCOUNTER — HOSPITAL ENCOUNTER (INPATIENT)
Facility: HOSPITAL | Age: 22
LOS: 3 days | Discharge: HOME | End: 2017-06-02
Attending: OBSTETRICS & GYNECOLOGY | Admitting: OBSTETRICS & GYNECOLOGY
Payer: MEDICAID

## 2017-05-30 DIAGNOSIS — Z3A.39 39 WEEKS GESTATION OF PREGNANCY: Primary | ICD-10-CM

## 2017-05-30 LAB
ABO + RH BLD: NORMAL
BASOPHILS # BLD AUTO: 0.01 K/UL
BASOPHILS NFR BLD: 0.1 %
BLD GP AB SCN CELLS X3 SERPL QL: NORMAL
DIFFERENTIAL METHOD: ABNORMAL
EOSINOPHIL # BLD AUTO: 0.1 K/UL
EOSINOPHIL NFR BLD: 0.9 %
ERYTHROCYTE [DISTWIDTH] IN BLOOD BY AUTOMATED COUNT: 13.2 %
HCT VFR BLD AUTO: 32.5 %
HGB BLD-MCNC: 10.8 G/DL
LYMPHOCYTES # BLD AUTO: 1.9 K/UL
LYMPHOCYTES NFR BLD: 19.3 %
MCH RBC QN AUTO: 29.9 PG
MCHC RBC AUTO-ENTMCNC: 33.2 %
MCV RBC AUTO: 90 FL
MONOCYTES # BLD AUTO: 0.7 K/UL
MONOCYTES NFR BLD: 7.3 %
NEUTROPHILS # BLD AUTO: 6.9 K/UL
NEUTROPHILS NFR BLD: 72 %
PLATELET # BLD AUTO: 281 K/UL
PMV BLD AUTO: 10.5 FL
RBC # BLD AUTO: 3.61 M/UL
WBC # BLD AUTO: 9.64 K/UL

## 2017-05-30 PROCEDURE — 11000001 HC ACUTE MED/SURG PRIVATE ROOM

## 2017-05-30 PROCEDURE — 62326 NJX INTERLAMINAR LMBR/SAC: CPT | Performed by: ANESTHESIOLOGY

## 2017-05-30 PROCEDURE — 25000003 PHARM REV CODE 250: Performed by: OBSTETRICS & GYNECOLOGY

## 2017-05-30 PROCEDURE — 27200710 HC EPIDURAL INFUSION PUMP SET: Performed by: ANESTHESIOLOGY

## 2017-05-30 PROCEDURE — 25000003 PHARM REV CODE 250: Performed by: ANESTHESIOLOGY

## 2017-05-30 PROCEDURE — 63600175 PHARM REV CODE 636 W HCPCS: Performed by: ANESTHESIOLOGY

## 2017-05-30 PROCEDURE — 85025 COMPLETE CBC W/AUTO DIFF WBC: CPT

## 2017-05-30 PROCEDURE — 86901 BLOOD TYPING SEROLOGIC RH(D): CPT

## 2017-05-30 PROCEDURE — 27800516 HC TRAY, EPIDURAL COMBO: Performed by: ANESTHESIOLOGY

## 2017-05-30 PROCEDURE — 86900 BLOOD TYPING SEROLOGIC ABO: CPT

## 2017-05-30 RX ORDER — MISOPROSTOL 200 UG/1
600 TABLET ORAL
Status: DISCONTINUED | OUTPATIENT
Start: 2017-05-30 | End: 2017-05-31

## 2017-05-30 RX ORDER — SODIUM CHLORIDE, SODIUM LACTATE, POTASSIUM CHLORIDE, CALCIUM CHLORIDE 600; 310; 30; 20 MG/100ML; MG/100ML; MG/100ML; MG/100ML
INJECTION, SOLUTION INTRAVENOUS CONTINUOUS
Status: DISCONTINUED | OUTPATIENT
Start: 2017-05-30 | End: 2017-05-31

## 2017-05-30 RX ORDER — MISOPROSTOL 100 MCG
50 TABLET ORAL EVERY 4 HOURS
Status: DISCONTINUED | OUTPATIENT
Start: 2017-05-30 | End: 2017-05-31

## 2017-05-30 RX ORDER — FENTANYL CITRATE 50 UG/ML
INJECTION, SOLUTION INTRAMUSCULAR; INTRAVENOUS
Status: DISCONTINUED | OUTPATIENT
Start: 2017-05-30 | End: 2017-06-15 | Stop reason: HOSPADM

## 2017-05-30 RX ORDER — ONDANSETRON 8 MG/1
8 TABLET, ORALLY DISINTEGRATING ORAL EVERY 8 HOURS PRN
Status: DISCONTINUED | OUTPATIENT
Start: 2017-05-30 | End: 2017-05-31

## 2017-05-30 RX ORDER — TERBUTALINE SULFATE 1 MG/ML
0.25 INJECTION SUBCUTANEOUS
Status: DISCONTINUED | OUTPATIENT
Start: 2017-05-30 | End: 2017-05-31

## 2017-05-30 RX ADMIN — FENTANYL CITRATE 90 MCG: 50 INJECTION, SOLUTION INTRAMUSCULAR; INTRAVENOUS at 11:05

## 2017-05-30 RX ADMIN — Medication 10 ML/HR: at 11:05

## 2017-05-30 RX ADMIN — SODIUM CHLORIDE, SODIUM LACTATE, POTASSIUM CHLORIDE, AND CALCIUM CHLORIDE: .6; .31; .03; .02 INJECTION, SOLUTION INTRAVENOUS at 11:05

## 2017-05-30 RX ADMIN — SODIUM CHLORIDE, SODIUM LACTATE, POTASSIUM CHLORIDE, AND CALCIUM CHLORIDE 1000 ML: .6; .31; .03; .02 INJECTION, SOLUTION INTRAVENOUS at 10:05

## 2017-05-30 NOTE — TELEPHONE ENCOUNTER
Patient called stating she is having contractions every 20 minutes, pain is getting unbearable. Patient was advised to go to L&D, patient verbalized understanding.

## 2017-05-30 NOTE — TELEPHONE ENCOUNTER
----- Message from Danica Pace sent at 5/30/2017  3:48 PM CDT -----  Contact: Self [phone]  Patient is calling to talk to nurse concerning she is 38 1/2 weeks pregnant and she is having severe contractions and she would like the doctors advice

## 2017-05-30 NOTE — TELEPHONE ENCOUNTER
Language line used.  Patient states her contractions are coming about every twenty minutes but they are very painful 10/10.  Patient states she already spoke with a nurse and was advised to go to labor and delivery.

## 2017-05-31 PROCEDURE — 25000003 PHARM REV CODE 250: Performed by: OBSTETRICS & GYNECOLOGY

## 2017-05-31 PROCEDURE — 10907ZC DRAINAGE OF AMNIOTIC FLUID, THERAPEUTIC FROM PRODUCTS OF CONCEPTION, VIA NATURAL OR ARTIFICIAL OPENING: ICD-10-PCS | Performed by: OBSTETRICS & GYNECOLOGY

## 2017-05-31 PROCEDURE — 11000001 HC ACUTE MED/SURG PRIVATE ROOM

## 2017-05-31 PROCEDURE — 72100002 HC LABOR CARE, 1ST 8 HOURS

## 2017-05-31 PROCEDURE — 51702 INSERT TEMP BLADDER CATH: CPT

## 2017-05-31 PROCEDURE — 0HQ9XZZ REPAIR PERINEUM SKIN, EXTERNAL APPROACH: ICD-10-PCS | Performed by: OBSTETRICS & GYNECOLOGY

## 2017-05-31 PROCEDURE — 59409 OBSTETRICAL CARE: CPT | Mod: GB,,, | Performed by: OBSTETRICS & GYNECOLOGY

## 2017-05-31 PROCEDURE — 99211 OFF/OP EST MAY X REQ PHY/QHP: CPT | Mod: 25

## 2017-05-31 PROCEDURE — 72200005 HC VAGINAL DELIVERY LEVEL II

## 2017-05-31 PROCEDURE — 72100003 HC LABOR CARE, EA. ADDL. 8 HRS

## 2017-05-31 PROCEDURE — 59025 FETAL NON-STRESS TEST: CPT

## 2017-05-31 RX ORDER — OXYTOCIN/RINGER'S LACTATE 20/1000 ML
2 PLASTIC BAG, INJECTION (ML) INTRAVENOUS
Status: DISCONTINUED | OUTPATIENT
Start: 2017-05-31 | End: 2017-05-31

## 2017-05-31 RX ORDER — DIPHENHYDRAMINE HYDROCHLORIDE 50 MG/ML
12.5 INJECTION INTRAMUSCULAR; INTRAVENOUS EVERY 4 HOURS PRN
Status: DISCONTINUED | OUTPATIENT
Start: 2017-06-01 | End: 2017-05-31

## 2017-05-31 RX ORDER — DIPHENHYDRAMINE HCL 25 MG
25 CAPSULE ORAL EVERY 4 HOURS PRN
Status: DISCONTINUED | OUTPATIENT
Start: 2017-05-31 | End: 2017-06-02 | Stop reason: HOSPADM

## 2017-05-31 RX ORDER — METOCLOPRAMIDE HYDROCHLORIDE 5 MG/ML
10 INJECTION INTRAMUSCULAR; INTRAVENOUS ONCE
Status: DISCONTINUED | OUTPATIENT
Start: 2017-06-01 | End: 2017-05-31

## 2017-05-31 RX ORDER — DEXTROSE MONOHYDRATE AND SODIUM CHLORIDE 5; .9 G/100ML; G/100ML
INJECTION, SOLUTION INTRAVENOUS CONTINUOUS
Status: DISCONTINUED | OUTPATIENT
Start: 2017-05-31 | End: 2017-06-02 | Stop reason: HOSPADM

## 2017-05-31 RX ORDER — FAMOTIDINE 10 MG/ML
20 INJECTION INTRAVENOUS ONCE
Status: DISCONTINUED | OUTPATIENT
Start: 2017-06-01 | End: 2017-05-31

## 2017-05-31 RX ORDER — DOCUSATE SODIUM 100 MG/1
200 CAPSULE, LIQUID FILLED ORAL 2 TIMES DAILY PRN
Status: DISCONTINUED | OUTPATIENT
Start: 2017-05-31 | End: 2017-06-02 | Stop reason: HOSPADM

## 2017-05-31 RX ORDER — NAPROXEN 500 MG/1
500 TABLET ORAL EVERY 8 HOURS PRN
Status: DISCONTINUED | OUTPATIENT
Start: 2017-05-31 | End: 2017-06-02 | Stop reason: HOSPADM

## 2017-05-31 RX ORDER — ONDANSETRON 2 MG/ML
4 INJECTION INTRAMUSCULAR; INTRAVENOUS ONCE
Status: DISCONTINUED | OUTPATIENT
Start: 2017-06-01 | End: 2017-05-31

## 2017-05-31 RX ORDER — OXYCODONE AND ACETAMINOPHEN 5; 325 MG/1; MG/1
1 TABLET ORAL EVERY 4 HOURS PRN
Status: DISCONTINUED | OUTPATIENT
Start: 2017-05-31 | End: 2017-06-02 | Stop reason: HOSPADM

## 2017-05-31 RX ORDER — SODIUM CITRATE AND CITRIC ACID MONOHYDRATE 334; 500 MG/5ML; MG/5ML
30 SOLUTION ORAL ONCE
Status: DISCONTINUED | OUTPATIENT
Start: 2017-06-01 | End: 2017-05-31

## 2017-05-31 RX ORDER — OXYTOCIN/RINGER'S LACTATE 20/1000 ML
41.65 PLASTIC BAG, INJECTION (ML) INTRAVENOUS CONTINUOUS
Status: ACTIVE | OUTPATIENT
Start: 2017-05-31 | End: 2017-05-31

## 2017-05-31 RX ORDER — NALBUPHINE HYDROCHLORIDE 20 MG/ML
2.5 INJECTION, SOLUTION INTRAMUSCULAR; INTRAVENOUS; SUBCUTANEOUS ONCE
Status: DISCONTINUED | OUTPATIENT
Start: 2017-06-01 | End: 2017-05-31

## 2017-05-31 RX ORDER — ONDANSETRON 8 MG/1
8 TABLET, ORALLY DISINTEGRATING ORAL EVERY 8 HOURS PRN
Status: DISCONTINUED | OUTPATIENT
Start: 2017-05-31 | End: 2017-06-02 | Stop reason: HOSPADM

## 2017-05-31 RX ADMIN — NAPROXEN 500 MG: 500 TABLET ORAL at 10:05

## 2017-05-31 RX ADMIN — OXYCODONE HYDROCHLORIDE AND ACETAMINOPHEN 1 TABLET: 5; 325 TABLET ORAL at 02:05

## 2017-05-31 RX ADMIN — NAPROXEN 500 MG: 500 TABLET ORAL at 02:05

## 2017-05-31 RX ADMIN — Medication 4 MILLI-UNITS/MIN: at 04:05

## 2017-05-31 RX ADMIN — OXYCODONE HYDROCHLORIDE AND ACETAMINOPHEN 1 TABLET: 5; 325 TABLET ORAL at 09:05

## 2017-05-31 NOTE — LACTATION NOTE
05/31/17 1300   Infant Information   Infant's Name [place]   Infant's Medical Care Provider Aric Denny   Maternal Infant Assessment   Breast Size Issue none   Breast Shape Bilateral:;round   Breast Density Bilateral:;soft   Areola Bilateral:;elastic   Nipple(s) Bilateral:;everted;other (see comments)  (nipples maikel w/ stimulation,scarring around both nipples)   Nipple Symptoms bilateral:;scarring;painful  (breast and nipples very painful since br sgy.)   Infant Assessment   Mouth Size average   Sucking Reflex present   Rooting Reflex present   Swallow Reflex absent   LATCH Score   Latch 2-->grasps breast, tongue down, lips flanged, rhythmic sucking   Audible Swallowing 0-->none   Type Of Nipple 2-->everted (after stimulation)   Comfort (Breast/Nipple) 2-->soft/nontender   Hold (Positioning) 1-->minimal assist, teach one side: mother does other, staff holds   Score (less than 7 for 2/more consecutive times, consult Lactation Consultant) 7   Pain/Comfort Assessments   Pain Assessment Performed Yes       Number Scale   Presence of Pain complains of pain/discomfort   Location - Side Bilateral   Location nipple(s)   Pain Rating: Rest 0   Pain Rating: Activity 10  (has pain of 10 to nipple while BF w/ br shield)   Maternal Infant Feeding   Maternal Preparation breast care;hand hygiene   Maternal Emotional State tense   Infant Positioning cradle  (left cradle hold,deep latch using nipple shield)   Presence of Pain yes   Time Spent (min) 30-60 min   Latch Assistance yes   Breastfeeding Education adequate infant intake;adequate milk volume;importance of skin-to-skin contact;increasing milk supply;milk expression, hand;other (see comments)  (br sgy and BF,positioning,s/d,s2s,nipple shield)   Breastfeeding History   Breastfeeding History yes   Previous Exclusive Breastfeeding no   Previous Breastfeeding Success unsuccessful   Duration of Previous Breastfeeding 1 mo. BF/Pumped   Previous Breastfeeding Problems (dec. milk  production)   Infant First Feeding   Skin-to-Skin Contact, Duration continued   Feeding Infant   Feeding Readiness Cues sustained alertness;sucking motion present;rooting;hand to mouth movements;eager   Feeding Tolerance/Success sustained alertness;strong suck;rooting;eager;coordinated suck;coordinated swallow;alert for feeding   Effective Latch During Feeding yes   Audible Swallow no   Skin-to-Skin Contact During Feeding yes   Lactation Referrals   Lactation Consult Breast/nipple pain;Initial assessment;Knowledge deficit   Lactation Interventions   Attachment Promotion breastfeeding assistance provided;environment adjusted;face-to-face positioning promoted;family involvement promoted;infant-mother separation minimized;privacy provided;role responsibility promoted;rooming-in promoted;skin-to-skin contact encouraged   Breastfeeding Assistance support offered;assisted with positioning;feeding cue recognition promoted;feeding on demand promoted;feeding session observed;infant latch-on verified;infant stimulated to wakeful state;nipple shield utilized   Maternal Breastfeeding Support diary/feeding log utilized;encouragement offered;infant-mother separation minimized;lactation counseling provided   Latch Promotion positioning assisted;infant moved to breast

## 2017-05-31 NOTE — H&P
"   UPDATED HISTORY AND PHYSICAL        2017  Subjective:       Adeel Kern is a 22 y.o.  female with IUP at 38w5d weeks gestation who c/o contractions.   Contractions have been occuring Q3 min and have increased in intensity.  This IUP is complicated by nothing .  Patient reports contractions, denies vaginal bleeding, denies LOF.   Fetal Movement: normal.     PMHx: History reviewed. No pertinent past medical history.    PSHx:   Past Surgical History:   Procedure Laterality Date    breast augmentation         All: Review of patient's allergies indicates:  No Known Allergies    Meds:   Prescriptions Prior to Admission   Medication Sig Dispense Refill Last Dose    MIRENA 20 mcg/24 hr (5 years) IUD TO BE INSERTED ONE TIME BY PRESCRIBER. ROUTE INTRAUTERINE.  0 Not Taking    PNV #26-iron ps-folic acid-dha (VITAFOL-ONE) 29 mg iron- 1 mg-200 mg Cap Take 1 tablet by mouth once daily. 60 capsule 5 Taking       SH:   Social History     Social History    Marital status:      Spouse name: N/A    Number of children: N/A    Years of education: N/A     Occupational History    Not on file.     Social History Main Topics    Smoking status: Never Smoker    Smokeless tobacco: Not on file    Alcohol use No    Drug use: No    Sexual activity: Yes     Partners: Male     Other Topics Concern    Not on file     Social History Narrative    No narrative on file       FH: History reviewed. No pertinent family history.    OBHx:   Obstetric History       T1      L1     SAB0   TAB0   Ectopic0   Multiple0   Live Births1       # Outcome Date GA Lbr Emerson/2nd Weight Sex Delivery Anes PTL Lv   2 Current            1 Term 10/16/12 39w0d   F INDUCTION EPI N JAQUAN          Review of Systems: Non contributory      Objective:       /62   Pulse 104   Temp 98.7 °F (37.1 °C) (Oral)   Resp 18   Ht 5' 1" (1.549 m)   Wt 71 kg (156 lb 8.4 oz)   LMP 2016   SpO2 98%   Breastfeeding? Yes   BMI " 29.58 kg/m²     Vitals:    05/31/17 1343 05/31/17 1345 05/31/17 1350 05/31/17 1355   BP: 110/62      Pulse: 101 101 107 104   Resp:       Temp:       TempSrc:       SpO2: (!) 92% 96% 97% 98%   Weight:       Height:           General:   alert, appears stated age, cooperative and no distress   Lungs:   clear to auscultation bilaterally   Heart:   regular rate and rhythm, S1, S2 normal, no murmur, click, rub or gallop   Abdomen:  soft, non-tender; bowel sounds normal; no masses,  no organomegaly   Extremities negative edema, negative erythema   FHT: 150 Cat 1 (reassuring)                 TOCO: Q 3 minutes   Presentations: cephalic by ultrasound   Cervix:     Dilation: 4    Effacement: 75%    Station:  -2    Consistency: medium    Position: anterior     AROM clear fluid     Lab Review  Blood Type O POS  GBBS: negative         Assessment:       38w5d weeks gestation.  LAbor     Patient Active Problem List   Diagnosis    Decreased fetal movement    26 weeks gestation of pregnancy    Abdominal pressure    Abdominal pain in pregnancy    Abdominal pain affecting pregnancy    39 weeks gestation of pregnancy          Plan:      Risks, benefits, alternatives and possible complications have been discussed in detail with the patient.   - Consents signed and to chart  - Admit to Labor and Delivery unit    - Epidural per Anesthesia  - Draw CBC, T&S  - Recheck in 2 hrs or PRN            Sven Smiley M.D.   OB/GYN    5/31/2017

## 2017-05-31 NOTE — L&D DELIVERY NOTE
Delivery Information for  Jesus Alberto Kern    Birth information:  YOB: 2019   Time of birth: 11:30 AM   Sex: male   Head Delivery Date/Time: 2017 11:30 AM   Delivery type: Vaginal, Spontaneous Delivery   Gestational Age: 38w5d    Delivery Providers    Delivering clinician:  Sven Smiley MD   Other personnel:   Provider Role   Danyell Ray, GEORGE Scott, RN    Adriana Menjivar RN                    Measurements    Weight:  3243 g            Assessment    Living status:  Living   Apgars:      1 Minute:   5 Minute:   10 Minute:   15 Minute:   20 Minute:     Skin Color:   1  1       Heart Rate:   2  2       Reflex Irritability:   2  2       Muscle Tone:   2  2       Respiratory Effort:   2  2       Total:   9  9                  Apgars Assigned By:  BRADFORD MENJIVAR RN          Assisted Delivery Details:    Forceps attempted?:  No   Vacuum extractor attempted?:  No             Shoulder Dystocia    Shoulder dystocia present?:  No            Presentation and Position    Presentation:   Vertex   Position:       Occiput    Anterior               Interventions/Resuscitation    Method:  Bulb Suctioning, Tactile Stimulation        Cord    Vessels:  3 vessels   Complications:  None   Delayed Cord Clamping?:  No   Cord Blood Disposition:  Sent with Baby   Stem Cell Collection (by MD):  No        Placenta    Date and time:  2017 11:33 AM   Removal:  Spontaneous   Appearance:  Intact   Placenta disposition:  discarded            Labor Events:       labor: No     Labor Onset Date/Time: 2017 23:35     Dilation Complete Date/Time:         Start Pushing Date/Time:       Rupture Date/Time:              Rupture type:           Fluid Amount:        Fluid Color:        Fluid Odor:        Membrane Status (PeriCalm): ARM (Artificial Rupture)      Rupture Date/Time (PeriCalm): 2017 07:40:00      Fluid Amount (PeriCalm): Moderate      Fluid Color (PeriCalm): Clear        steroids: None     Antibiotics given for GBS: No     Induction: none     Indications for induction:        Augmentation: oxytocin     Indications for augmentation:       Labor complications:       Additional complications:          Cervical ripening:                     Delivery:      Episiotomy: None     Indication for Episiotomy:       Perineal Lacerations: 2nd Repaired:  Yes   Periurethral Laceration: none Repaired:     Labial Laceration: none Repaired:     Sulcus Laceration: none Repaired:     Vaginal Laceration: No Repaired:     Cervical Laceration: No Repaired:     Repair suture:       Repair # of packets: 1     Vaginal delivery QBL (mL): 374      QBL (mL): 0     Combined Blood Loss (mL): 374     Vaginal Sweep Performed: Yes     Surgicount Correct:         Other providers:       Anesthesia    Method:  Spinal, Epidural              Details (if applicable):  Trial of Labor      Categorization:      Priority:     Indications for :     Incision Type:       Additional  information:  Forceps:    Vacuum:    Breech:    Observed anomalies    Other (Comments):         DELIVERY NOTE:    After complete dilatation and +2 station  Patient pushes x 15 minutes , delivering    One viable infant sex   (x  )M / (  ) F    , in OA position   Nuchal cord= NO  Shoulder dystocia= NO   Apgar 9/9  Amniotic fluid= clear   Placenta= normal intact , complete   Umbilical Cord= 3 vessels     Episiotomy =         Yes(  )  NO  (x  )   Perineal tears =     Yes( x )  NO  ( ) 1st degree midline , repaired CC 2-0  Vaginal tears=       Yes(  )  NO  ( x )  EBL = 600cc    No complications     Sven Smiley M.D.   OB/GYN

## 2017-05-31 NOTE — PLAN OF CARE
Problem: Breastfeeding (Adult,Obstetrics,Pediatric)  Goal: Signs and Symptoms of Listed Potential Problems Will be Absent, Minimized or Managed (Breastfeeding)  Signs and symptoms of listed potential problems will be absent, minimized or managed by discharge/transition of care (reference Breastfeeding (Adult,Obstetrics,Pediatric) CPG).   Outcome: Ongoing (interventions implemented as appropriate)  Mother to attempt to breastfeed with or without using nipple shield 8 or more times in 24 hrs. and on cue. She will do lots of skin to skin with baby before feedings and between feedings.  She will monitor baby for signs of an adequate feeding.  She will follow up with pediatrician as instructed by MD after discharge.

## 2017-05-31 NOTE — LACTATION NOTE
Mother stated that she had a breast augmentation in 2013 in [facility].  The surgeon removed both her nipples, she has scarring around both areolas.  Mom stated that she has not seen any colostrum and was unable to hand express anything.  Both breast are extremely painful when touched, stated they have been this was since surgery.  Attempted to latch baby to left breast using nipple shield, baby latches deeply with lots of sucking, no swallowing heard.  Discussed with her breast surgery and breastfeeding, need for close follow up, how to evaluate an adequate feeding, supply/demand, hand expression, etc. Mother stated that her pain is a 10 when baby is breastfeeding with nipple shield and wants to think about whether she wants to continue with breastfeeding.  Positive reinforcement given to patient, all questions answered.

## 2017-05-31 NOTE — ANESTHESIA PREPROCEDURE EVALUATION
05/30/2017  Adeel Kern is a 22 y.o., female.    Anesthesia Evaluation     I have reviewed the Nursing Notes.   I have reviewed the Medications.     Review of Systems  Anesthesia Hx:  No problems with previous Anesthesia Denies Hx of Anesthetic complications Denies Family Hx of Anesthesia complications.    Social:  Non-Smoker, No Alcohol Use    Hematology/Oncology:  Hematology Normal   Oncology Normal     EENT/Dental:EENT/Dental Normal   Cardiovascular:  Cardiovascular Normal Exercise tolerance: good   Functional Capacity good / => 4 METS    Pulmonary:  Pulmonary Normal    Renal/:  Renal/ Normal     Hepatic/GI:  Hepatic/GI Normal    Musculoskeletal:  Musculoskeletal Normal    Neurological:  Neurology Normal    Endocrine:  Endocrine Normal           Anesthesia Plan  Type of Anesthesia, risks & benefits discussed:  Anesthesia Type:  CSE  Patient's Preference: CSE  Intra-op Monitoring Plan:   Intra-op Monitoring Plan Comments:   Post Op Pain Control Plan:   Post Op Pain Control Plan Comments:   Induction:    Beta Blocker:         Informed Consent: Patient understands risks and agrees with Anesthesia plan.  Questions answered. Anesthesia consent signed with patient.  ASA Score: 2  emergent   Day of Surgery Review of History & Physical:            Ready For Surgery From Anesthesia Perspective.

## 2017-05-31 NOTE — PLAN OF CARE
() - Received from home, 23 y/o  @ 38.4 weeks, +prenatal care with Dr. Smiley, with complaint of intermittent abdominal pain, denies ROM or vaginal bleeding.  Patient and significant other placed in triage #1, placed on EFM and [place], nursing assessment initiated.  See flow sheets for complete and continuing assessments.  () - Sterile vaginal exam performed:  2-3 60 / -3 and intact.  () - Contacted Dr. Smiley, cervical and contraction status relayed to him, stated to admit the patient for labr; orders read back and verified.  () - Phlebotomist at bedside to obtain MD ordered admit labs.  () - Patient transferred to R 358, along with family members.  Patient requests an epidural for pain of 6/10 on PRS.  () - PIV initiated to left hand with 18 gauge jelco, lactated ringers up and infusing at bolus rate without difficulty, no redness or swelling noted at site.  () - Dr. Griffin notified of need for epidural placement.  () - Dr. Griffin at bedside.  () - Epidural placement completed, tolerated well.  14F harrington catheter placed without difficulty.  () - Sterile vaginal Exam performed:   / -2 and intact, patient denies any pain or distress.  () - Spoke with Dr. Smiley, cervical, labor and pain status given; orders given to keep evaluating patient and initiate pitocin augmentation at 0500 - read back and verified.  (0013) - VSS/WNL.  Resting quietly, no c/o pain, NAD noted.  (0314) - VSS/WNL.  Resting quietly with eyes closed, NAD noted.  (0555) - VSS.  Resting quietly with eyes closed, no complaint of pain, NAD noted.  Sterile vaginal exam performed:  5.5/ -2 and intact.  Pitocin augmentation initiated at 4 mu/min via pump.  See flow sheets for complete and continuing laboring assessments.  (610) - Pitocin increased to 8 mu/min.  (06) - Dr. Smiley called in, checked on status of patient, no new orders given.  (630) - Sterile vaginal exam performed:  / -2  and intact.  (7097) - Report given to PARRIS Ray RN.

## 2017-05-31 NOTE — ANESTHESIA PROCEDURE NOTES
CSE    Patient location during procedure: OB  Start time: 5/30/2017 11:01 PM  Timeout: 5/30/2017 11:00 PM  End time: 5/30/2017 11:10 PM  Staffing  Anesthesiologist: CASH TERRY  Performed: anesthesiologist   Preanesthetic Checklist  Completed: patient identified, pre-op evaluation, IV checked, risks and benefits discussed and monitors and equipment checked  CSE  Patient position: sitting  Prep: Betadine and ChloraPrep  Patient monitoring: heart rate, continuous pulse ox and frequent blood pressure checks  Approach: midline  Spinal Needle  Needle type: [place]   Needle gauge: 25 G  Needle length: 5 in  Epidural Needle  Injection technique: VIKTOR air  Needle type: Tuohy   Needle gauge: 17 G  Needle length: 3.5 in  Needle insertion depth: 5 cm  Location: L3-4  Needle localization: anatomical landmarks  Catheter  Catheter type: [employer]  Catheter size: 20 G  Catheter at skin depth: 12 cm  Test dose: lidocaine 1.5% with Epi 1-to-200,000 and negative  Additional Documentation: negative aspiration for CSF, negative aspiration for heme, no paresthesia on injection and negative test dose  Assessment  Sensory level: T6  Intrathecal Medications:  Bolus administered: 1.5 mL ofropivacaine  administered: 10 mcg of  fentanyl  Additional Notes      Pump settings 10 ml/h; bolus 5ml; lockout 15 min; max 3/hr

## 2017-06-01 LAB
BASOPHILS # BLD AUTO: 0.01 K/UL
BASOPHILS NFR BLD: 0.1 %
DIFFERENTIAL METHOD: ABNORMAL
EOSINOPHIL # BLD AUTO: 0.1 K/UL
EOSINOPHIL NFR BLD: 1 %
ERYTHROCYTE [DISTWIDTH] IN BLOOD BY AUTOMATED COUNT: 13.3 %
HCT VFR BLD AUTO: 30.3 %
HGB BLD-MCNC: 10.2 G/DL
LYMPHOCYTES # BLD AUTO: 2.6 K/UL
LYMPHOCYTES NFR BLD: 23.5 %
MCH RBC QN AUTO: 30 PG
MCHC RBC AUTO-ENTMCNC: 33.7 %
MCV RBC AUTO: 89 FL
MONOCYTES # BLD AUTO: 0.8 K/UL
MONOCYTES NFR BLD: 7.6 %
NEUTROPHILS # BLD AUTO: 7.3 K/UL
NEUTROPHILS NFR BLD: 67.3 %
PLATELET # BLD AUTO: 233 K/UL
PMV BLD AUTO: 10.6 FL
RBC # BLD AUTO: 3.4 M/UL
WBC # BLD AUTO: 10.89 K/UL

## 2017-06-01 PROCEDURE — 85025 COMPLETE CBC W/AUTO DIFF WBC: CPT

## 2017-06-01 PROCEDURE — 36415 COLL VENOUS BLD VENIPUNCTURE: CPT

## 2017-06-01 PROCEDURE — 25000003 PHARM REV CODE 250: Performed by: OBSTETRICS & GYNECOLOGY

## 2017-06-01 PROCEDURE — 11000001 HC ACUTE MED/SURG PRIVATE ROOM

## 2017-06-01 RX ORDER — NAPROXEN 500 MG/1
500 TABLET ORAL EVERY 8 HOURS PRN
Qty: 30 TABLET | Refills: 0 | Status: SHIPPED | OUTPATIENT
Start: 2017-06-01 | End: 2020-08-18

## 2017-06-01 RX ORDER — OXYCODONE AND ACETAMINOPHEN 5; 325 MG/1; MG/1
1 TABLET ORAL EVERY 4 HOURS PRN
Qty: 20 TABLET | Refills: 0 | Status: SHIPPED | OUTPATIENT
Start: 2017-06-01 | End: 2020-08-18

## 2017-06-01 RX ADMIN — OXYCODONE HYDROCHLORIDE AND ACETAMINOPHEN 1 TABLET: 5; 325 TABLET ORAL at 06:06

## 2017-06-01 RX ADMIN — NAPROXEN 500 MG: 500 TABLET ORAL at 11:06

## 2017-06-01 RX ADMIN — OXYCODONE HYDROCHLORIDE AND ACETAMINOPHEN 1 TABLET: 5; 325 TABLET ORAL at 11:06

## 2017-06-01 NOTE — PROGRESS NOTES
"Adeel Kern is a 22 y.o. female   PPD #1 status post  Spontaneous vaginal delivery. has no problems  Patient reports no abd pain that is well Relieved by Oral pain medications. Lochia is mild to moderate  and decreasing, Voiding without difficulty Ambulating with no difficulty, has not passed flatus, has not had BM,  Patient does plan to breast feed.    Objective:       /62 (BP Location: Right arm, Patient Position: Lying, BP Method: Automatic)   Pulse 82   Temp 98.4 °F (36.9 °C) (Oral)   Resp 18   Ht 5' 1" (1.549 m)   Wt 71 kg (156 lb 8.4 oz)   LMP 08/21/2016   SpO2 100%   Breastfeeding? Yes   BMI 29.58 kg/m²   Vitals:    05/31/17 1600 05/31/17 2000 06/01/17 0200 06/01/17 0800   BP: 111/73 109/66 104/60 105/62   BP Location: Left arm Right arm Right arm Right arm   Patient Position: Sitting Lying Lying Lying   BP Method: Automatic Automatic Automatic Automatic   Pulse: 84 79 65 82   Resp: 18 20 18 18   Temp: 98.5 °F (36.9 °C) 98 °F (36.7 °C) 97.8 °F (36.6 °C) 98.4 °F (36.9 °C)   TempSrc: Oral Oral Oral Oral   SpO2:  100% 100% 100%   Weight:       Height:         General:   alert, appears stated age and cooperative   Lungs:   clear to auscultation bilaterally   Heart:   regular rate and rhythm, S1, S2 normal, no murmur, click, rub or gallop   Abdomen:  soft, non-tender; bowel sounds normal; no masses,  no organomegaly   Uterus:  firm located at the umblicus.        Extremities: peripheral pulses normal, no pedal edema, no clubbing or cyanosis     Lab Review  Recent Results (from the past 72 hour(s))   CBC with Auto Differential    Collection Time: 05/30/17 10:09 PM   Result Value Ref Range    WBC 9.64 3.90 - 12.70 K/uL    RBC 3.61 (L) 4.00 - 5.40 M/uL    Hemoglobin 10.8 (L) 12.0 - 16.0 g/dL    Hematocrit 32.5 (L) 37.0 - 48.5 %    MCV 90 82 - 98 fL    MCH 29.9 27.0 - 31.0 pg    MCHC 33.2 32.0 - 36.0 %    RDW 13.2 11.5 - 14.5 %    Platelets 281 150 - 350 K/uL    MPV 10.5 9.2 - 12.9 fL    Gran # 6.9 " 1.8 - 7.7 K/uL    Lymph # 1.9 1.0 - 4.8 K/uL    Mono # 0.7 0.3 - 1.0 K/uL    Eos # 0.1 0.0 - 0.5 K/uL    Baso # 0.01 0.00 - 0.20 K/uL    Gran% 72.0 38.0 - 73.0 %    Lymph% 19.3 18.0 - 48.0 %    Mono% 7.3 4.0 - 15.0 %    Eosinophil% 0.9 0.0 - 8.0 %    Basophil% 0.1 0.0 - 1.9 %    Differential Method Automated    Type & Screen, Labor & Delivery    Collection Time: 05/30/17 10:09 PM   Result Value Ref Range    Group & Rh O POS     Indirect Vadim NEG    CBC auto differential    Collection Time: 06/01/17  5:03 AM   Result Value Ref Range    WBC 10.89 3.90 - 12.70 K/uL    RBC 3.40 (L) 4.00 - 5.40 M/uL    Hemoglobin 10.2 (L) 12.0 - 16.0 g/dL    Hematocrit 30.3 (L) 37.0 - 48.5 %    MCV 89 82 - 98 fL    MCH 30.0 27.0 - 31.0 pg    MCHC 33.7 32.0 - 36.0 %    RDW 13.3 11.5 - 14.5 %    Platelets 233 150 - 350 K/uL    MPV 10.6 9.2 - 12.9 fL    Gran # 7.3 1.8 - 7.7 K/uL    Lymph # 2.6 1.0 - 4.8 K/uL    Mono # 0.8 0.3 - 1.0 K/uL    Eos # 0.1 0.0 - 0.5 K/uL    Baso # 0.01 0.00 - 0.20 K/uL    Gran% 67.3 38.0 - 73.0 %    Lymph% 23.5 18.0 - 48.0 %    Mono% 7.6 4.0 - 15.0 %    Eosinophil% 1.0 0.0 - 8.0 %    Basophil% 0.1 0.0 - 1.9 %    Differential Method Automated        I/O    Intake/Output Summary (Last 24 hours) at 06/01/17 1220  Last data filed at 05/31/17 1530   Gross per 24 hour   Intake                0 ml   Output              500 ml   Net             -500 ml        Assessment:     Patient Active Problem List   Diagnosis    Decreased fetal movement    26 weeks gestation of pregnancy    Abdominal pressure    Abdominal pain in pregnancy    Abdominal pain affecting pregnancy    39 weeks gestation of pregnancy        Plan:   1. Patient doing well. Continue routine management and advances.  2. Continue PO pain meds. Pain well controlled.  3. H/h 10.8/32.5.   4. Encourage ambulation.     Sven Smiley M.D.   OB/GYN    6/1/2017

## 2017-06-01 NOTE — ANESTHESIA POSTPROCEDURE EVALUATION
"Anesthesia Post Evaluation    Patient: Adeel Kern    Procedure(s) Performed: * No procedures listed *    Final Anesthesia Type: CSE  Patient location during evaluation: labor & delivery  Patient participation: Yes- Able to Participate  Level of consciousness: awake and alert and oriented  Post-procedure vital signs: reviewed and stable  Pain management: adequate  Airway patency: patent  PONV status at discharge: No PONV  Anesthetic complications: no      Cardiovascular status: blood pressure returned to baseline and hemodynamically stable  Respiratory status: unassisted, spontaneous ventilation and room air  Hydration status: euvolemic  Follow-up not needed.        Visit Vitals  /66 (BP Location: Right arm, Patient Position: Lying, BP Method: Automatic)   Pulse 77   Temp 36.9 °C (98.4 °F) (Oral)   Resp 18   Ht 5' 1" (1.549 m)   Wt 71 kg (156 lb 8.4 oz)   LMP 08/21/2016   SpO2 100%   Breastfeeding? Yes   BMI 29.58 kg/m²       Pain/Eunice Score: Pain Rating Prior to Med Admin: 5 (6/1/2017 11:55 AM)  Pain Rating Post Med Admin: 0 (5/31/2017 11:15 PM)      "

## 2017-06-01 NOTE — PLAN OF CARE
Problem: Breastfeeding (Adult,Obstetrics,Pediatric)  Goal: Signs and Symptoms of Listed Potential Problems Will be Absent, Minimized or Managed (Breastfeeding)  Signs and symptoms of listed potential problems will be absent, minimized or managed by discharge/transition of care (reference Breastfeeding (Adult,Obstetrics,Pediatric) CPG).   Outcome: Ongoing (interventions implemented as appropriate)  Patient has decided not to breastfeed. Says it hurts too much with her breast surgery. Plans to exclusively formula feed. Gave her information on non-nursing engorgement. Told to call with needs.

## 2017-06-01 NOTE — PLAN OF CARE
Problem: Breastfeeding (Adult,Obstetrics,Pediatric)  Goal: Signs and Symptoms of Listed Potential Problems Will be Absent, Minimized or Managed (Breastfeeding)  Signs and symptoms of listed potential problems will be absent, minimized or managed by discharge/transition of care (reference Breastfeeding (Adult,Obstetrics,Pediatric) CPG).   Outcome: Ongoing (interventions implemented as appropriate)  Patient says she can't breastfeed because it hurts too much and she just can't do it.

## 2017-06-02 VITALS
DIASTOLIC BLOOD PRESSURE: 67 MMHG | HEIGHT: 61 IN | HEART RATE: 101 BPM | BODY MASS INDEX: 29.55 KG/M2 | SYSTOLIC BLOOD PRESSURE: 104 MMHG | RESPIRATION RATE: 18 BRPM | OXYGEN SATURATION: 99 % | WEIGHT: 156.5 LBS | TEMPERATURE: 98 F

## 2017-06-02 PROCEDURE — 25000003 PHARM REV CODE 250: Performed by: OBSTETRICS & GYNECOLOGY

## 2017-06-02 PROCEDURE — 99238 HOSP IP/OBS DSCHRG MGMT 30/<: CPT | Mod: ,,, | Performed by: OBSTETRICS & GYNECOLOGY

## 2017-06-02 RX ADMIN — NAPROXEN 500 MG: 500 TABLET ORAL at 02:06

## 2017-06-02 RX ADMIN — OXYCODONE HYDROCHLORIDE AND ACETAMINOPHEN 1 TABLET: 5; 325 TABLET ORAL at 02:06

## 2017-06-02 RX ADMIN — NAPROXEN 500 MG: 500 TABLET ORAL at 12:06

## 2017-06-02 RX ADMIN — OXYCODONE HYDROCHLORIDE AND ACETAMINOPHEN 1 TABLET: 5; 325 TABLET ORAL at 09:06

## 2017-06-02 NOTE — DISCHARGE INSTRUCTIONS
"Instrucciones Para Julissa de Kelly    Instrucciones a Seguir    Dieta regular  Actividad: Aumentarntar gradualmente  An: Regadera o Francesca  Restricciones: No levantar nada pesado  Cuidado Personal: No tampones o duchas vaginales  Actividad Sexual: Shahana relaciones sexuales  Planificacion Familiar: Consulta con gastelum medico  Cuidado de los Senos: Use un sosten de soporte  Regresar al trabajo/escuela: Cuando gastelum medico le indique    Cuando debe llamar al Doctor    *Fiebre de 100.4 o mas alto  *Nausea/Vomito persistente  *Secrecion de la incision  *Thomas sangrado vaginal o coagulos  *Inflamacion/dolor en los brazos o las piernas  *Severo dolor de radha, vision borrosa or desmayos  *Frequencia/ardor urinario  *Senales de depresion post-parto        La Depresion Post-Parto    Usted acaba de tener un nazia'.  A pesar de que sabe que deberia sentirse emocionada y jeong, lo que seinte son ganas de llorar sin motivo y tiene dificultades para realizar abel tareas diarias.  Usted pasa la mayor parte del tiempo hernando, cansada y desesperanzada, y hasta podria sentirse avergonzada o culpable.  Alecia lo que le esta sucediendo no es culpa suya, y puede sentirse mejor.  Hable con gastelum medico para que la ayude.     La Depresion Despues del Parto    Robbie vez sienta cansancio y ganas de llorar jim despues de julissa a felipe.  Esta etapa melancolica, denominada "baby blues", puede hacerla sentir hernando y desesperanzada, o temerosa de que algo charles le vaya a pasar al nazia.  Algunas mujeres hasta llegan a poner en gianni el que puedan ser buenas madres.    Que' es la Depresion?    La depresion es un trastorno del animo que afecta gastelum manera de pernsar y de sentir.  El sintoma mas frecuente es un sentimiento de honda tristeza: tambien podria causane la sensacion de que ya no puede sobrellevar la rosales.    Otros sintomas incluyen:      * Ganar o perder mucho peso  * Dormir en exceso o demasiado poco  * Estar cansada todo el tiempo  * Sentirse inquieta  * " Tener miedo de querer herir al nazia'  * No tener interes por el nazia'  * Sentirse inutil o culpable   * No encontrar placer en las cosas que solia gustarle hacer  * Dificultades para pensar con claridad o rayshawn decisiones  * Pensar sobre la muerte o el suicidio     Que' Causa la Depresion Postparto?    La causea exacta de la depresion postarto se desconce, aunque posiblemente es el resultado de los cambios hormonales que suceden hemalatha y despues del parto.  Tambien puede deberse al cansancio que le causan las exigencias del nazia' y el proceso de adaptacion a gastelum maternidad.  Todos estos factores podrian deprimirla.  En algunos casos, existe [place] predisposicion genetica a zen tipo de depresion.    La depresion puede tratarse    Lo carrion es que hay muchas maneras de tratar la depresion postparo.  Emprenda el primer paso para sentirse mejor hablando con gastelum medico.     Recursos    * National Institutes of Mental Health-- 484-495-3861     www.nimh.nih.gov    * National [place] on Mental Illness -- 265-875-8176     Www.km.org    * Mental Health Tere --  208.291.9188     Www.nmha.org    * National Suidide Hotline -- 565.323.5781    1630-5520 [employer]  Todos los derechos reservados.  Esta informacion no pretende sustituir las atencion medica profesional.  Solo gastelum medico puede diagnosticar y tratar un problema de manohar.

## 2017-06-02 NOTE — PLAN OF CARE
1500 - Discharge instructions given verbally and in writing.  Verbalized understanding.  Received Mother-Baby care guide during hospital stay.  Prescriptions given with explanation for use.  Verbalized understanding.  CDC vaccine information statement given and risk/benifits of vaccine discussed.  Tdap refused per pt. request.  States she feels comfortable taking care of baby and has demonstrated ability to care for  and herself. Says she will have assistance when she returns home.     1550 -  Discharged to home in stable condition via wheelchair with infant in arms.

## 2017-06-02 NOTE — DISCHARGE SUMMARY
Delivery Discharge Summary  Obstetrics      Primary OB Clinician: Sven Smiley    Admission date: 2017  Discharge date: 2017    Admit Dx:   Patient Active Problem List   Diagnosis    Decreased fetal movement    26 weeks gestation of pregnancy    Abdominal pressure    Abdominal pain in pregnancy    Abdominal pain affecting pregnancy    39 weeks gestation of pregnancy     Discharge Dx:   Patient Active Problem List   Diagnosis    Decreased fetal movement    26 weeks gestation of pregnancy    Abdominal pressure    Abdominal pain in pregnancy    Abdominal pain affecting pregnancy    39 weeks gestation of pregnancy       Procedure:     No results for input(s): WBC, RBC, HGB, HCT, PLT, MCV, MCH, MCHC in the last 24 hours.    Hospital Course:  Pt is a 22 y.o. now , PPD # 2 was admitted on 2017 in labor   . On initial assessment, vital signs were stable and physical exam was Normal. Infant was in cephalic presentation. Patient was subsequently admitted to labor and delivery unit with signed consents.  Patient delivered a single viable  male. Please see delivery note for further details. Pt was in stable condition post delivery and was transferred to the Mother-Baby Unit. Her postpartum course was uncomplicated. On discharge day, patient's pain is well  controlled with oral pain medications. Pt is tolerating ambulation without SOB or CP, and PO diet without N/V. Reports lochia is minimal in degree. Denies any HA, vision changes, F/C, LE swelling. Denies any breast pain/soreness.  Pt in stable condition and ready for discharge, instructed to continue pain medications  and to follow up in the OB clinic in 4-6 weeks with       Delivery:    Episiotomy: None   Lacerations: 2nd   Repair suture:     Repair # of packets: 1   Blood loss (ml): 374     Birth information:  YOB: 2019   Time of birth: 11:30 AM   Sex: male   Delivery type: Vaginal, Spontaneous  Delivery   Gestational Age: 38w5d    Delivery Clinician:      Other providers:       Additional  information:  Forceps:    Vacuum:    Breech:    Observed anomalies      Living?:           APGARS  One minute Five minutes Ten minutes   Skin color:         Heart rate:         Grimace:         Muscle tone:         Breathing:         Totals: 9  9        Placenta: Delivered:       appearance      Patient Instructions:   Current Discharge Medication List      START taking these medications    Details   naproxen (NAPROSYN) 500 MG tablet Take 1 tablet (500 mg total) by mouth every 8 (eight) hours as needed (Cramping).  Qty: 30 tablet, Refills: 0      oxycodone-acetaminophen (PERCOCET) 5-325 mg per tablet Take 1 tablet by mouth every 4 (four) hours as needed.  Qty: 20 tablet, Refills: 0         CONTINUE these medications which have NOT CHANGED    Details   MIRENA 20 mcg/24 hr (5 years) IUD TO BE INSERTED ONE TIME BY PRESCRIBER. ROUTE INTRAUTERINE.  Refills: 0      PNV #26-iron ps-folic acid-dha (VITAFOL-ONE) 29 mg iron- 1 mg-200 mg Cap Take 1 tablet by mouth once daily.  Qty: 60 capsule, Refills: 5             Patient is Discharged  home today   General recommendations and alarm signs are given  Pelvic rest   Follow up with Dr Smiley  in  ( )2  -  (x ) 4   weeks   Rx sent electronically  To pharmacy on file   All questions answered       Sven Smiley M.D.   OB/GYN  6/2/2017

## 2017-06-02 NOTE — PLAN OF CARE
Problem: Patient Care Overview  Goal: Individualization & Mutuality  Outcome: Outcome(s) achieved Date Met: 06/02/17  Pt bonding well with mother, pain well controlled with po pain medication. Pt has small amount of lochia rubra, urinating and passing gas well.

## 2017-06-05 NOTE — DISCHARGE SUMMARY
Delivery Discharge Summary  Obstetrics      Primary OB Clinician: Sven Smiley    Admission date: 2017  Discharge date: 2017    Admit Dx:   Patient Active Problem List   Diagnosis    Decreased fetal movement    26 weeks gestation of pregnancy    Abdominal pressure    Abdominal pain in pregnancy    Abdominal pain affecting pregnancy    39 weeks gestation of pregnancy     Discharge Dx:   Patient Active Problem List   Diagnosis    Decreased fetal movement    26 weeks gestation of pregnancy    Abdominal pressure    Abdominal pain in pregnancy    Abdominal pain affecting pregnancy    39 weeks gestation of pregnancy       Procedure:     No results for input(s): WBC, RBC, HGB, HCT, PLT, MCV, MCH, MCHC in the last 24 hours.    Hospital Course:  Pt is a 22 y.o. now , PPD # 2 was admitted on 2017 in labor   . On initial assessment, vital signs were stable and physical exam was Normal. Infant was in cephalic presentation. Patient was subsequently admitted to labor and delivery unit with signed consents.  Patient delivered a single viable  male. Please see delivery note for further details. Pt was in stable condition post delivery and was transferred to the Mother-Baby Unit. Her postpartum course was uncomplicated. On discharge day, patient's pain is well  controlled with oral pain medications. Pt is tolerating ambulation without SOB or CP, and PO diet without N/V. Reports lochia is minimal in degree. Denies any HA, vision changes, F/C, LE swelling. Denies any breast pain/soreness.  Pt in stable condition and ready for discharge, instructed to continue pain medications  and to follow up in the OB clinic in 4-6 weeks with       Delivery:    Episiotomy: None   Lacerations: 2nd   Repair suture:     Repair # of packets: 1   Blood loss (ml): 374     Birth information:  YOB: 2019   Time of birth: 11:30 AM   Sex: male   Delivery type: Vaginal, Spontaneous  Delivery   Gestational Age: 38w5d    Delivery Clinician:      Other providers:       Additional  information:  Forceps:    Vacuum:    Breech:    Observed anomalies      Living?:           APGARS  One minute Five minutes Ten minutes   Skin color:         Heart rate:         Grimace:         Muscle tone:         Breathing:         Totals: 9  9        Placenta: Delivered:       appearance      Patient Instructions:   Discharge Medication List as of 6/2/2017  1:34 PM      START taking these medications    Details   naproxen (NAPROSYN) 500 MG tablet Take 1 tablet (500 mg total) by mouth every 8 (eight) hours as needed (Cramping)., Starting Thu 6/1/2017, Normal      oxycodone-acetaminophen (PERCOCET) 5-325 mg per tablet Take 1 tablet by mouth every 4 (four) hours as needed., Starting Thu 6/1/2017, Normal         CONTINUE these medications which have NOT CHANGED    Details   MIRENA 20 mcg/24 hr (5 years) IUD TO BE INSERTED ONE TIME BY PRESCRIBER. ROUTE INTRAUTERINE., Historical Med      PNV #26-iron ps-folic acid-dha (VITAFOL-ONE) 29 mg iron- 1 mg-200 mg Cap Take 1 tablet by mouth once daily., Starting 2/13/2017, Until Tue 5/16/17, Normal             Patient is Discharged  home today   General recommendations and alarm signs are given  Pelvic rest   Follow up with Dr Smiley  in  ( )2  -  (x ) 4   weeks   Rx sent electronically  To pharmacy on file   All questions answered       Sven Smiley M.D.   OB/GYN  6/4/2017

## 2017-06-06 NOTE — PHYSICIAN QUERY
PT Name: Adeel Kern  MR #: [id number]     Physician Query Form - Documentation Clarification      CDS/: Virginie Recinos               Contact information: [email or web address]      This form is a permanent document in the medical record.     Query Date: June 6, 2017    By submitting this query, we are merely seeking further clarification of documentation. Please utilize your independent clinical judgment when addressing the question(s) below.    The Medical record reflects the following:    Supporting Clinical Findings Location in Medical Record   Perineal Lacerations: 2nd Repaired:       Perineal tears =     Yes( x )  NO  ( ) 1st degree midline , repaired CC 2-0   Delivery Note 5/31        Delivery Note 5/31                                                                                      Doctor, there are inconsistent documentation of perineal laceration. Can you please clarify?     Provider Use Only      (  ) 1st degree and repair  (  ) 2nd degree and repair  (  ) Other (please specify)_____________________      dont understand the inconsistency   1st degree midline tear                                                                                                                        [  ] Clinically undetermined

## 2017-06-30 ENCOUNTER — ROUTINE PRENATAL (OUTPATIENT)
Dept: OBSTETRICS AND GYNECOLOGY | Facility: CLINIC | Age: 22
End: 2017-06-30
Payer: MEDICAID

## 2017-06-30 VITALS — BODY MASS INDEX: 25.74 KG/M2 | DIASTOLIC BLOOD PRESSURE: 67 MMHG | WEIGHT: 136.25 LBS | SYSTOLIC BLOOD PRESSURE: 98 MMHG

## 2017-06-30 DIAGNOSIS — Z30.09 GENERAL COUNSELING AND ADVICE FOR CONTRACEPTIVE MANAGEMENT: ICD-10-CM

## 2017-06-30 PROCEDURE — 99212 OFFICE O/P EST SF 10 MIN: CPT | Mod: PBBFAC,PO | Performed by: OBSTETRICS & GYNECOLOGY

## 2017-06-30 PROCEDURE — 99499 UNLISTED E&M SERVICE: CPT | Mod: S$PBB,,, | Performed by: OBSTETRICS & GYNECOLOGY

## 2017-06-30 PROCEDURE — 99999 PR PBB SHADOW E&M-EST. PATIENT-LVL II: CPT | Mod: PBBFAC,,, | Performed by: OBSTETRICS & GYNECOLOGY

## 2017-07-05 NOTE — PROGRESS NOTES
CC: Post-partum follow-up    Adeel Kern is a 22 y.o. female  who presents for post-partum visit.    She is S/P a .  Delivery Date= 17     She and the baby are doing well.  No pain.  No fever.   No bowel / bladder complaints.    Delivery MD: Baljit    Breast Feeding: NO  Depression: NO  Contraception: no method    Pregnancy was complicated by:  Nothing     BP 98/67   Wt 61.8 kg (136 lb 3.9 oz)   LMP 2016   BMI 25.74 kg/m²     ROS:  GENERAL: No fever, chills, fatigability.  VULVAR: No pain, no lesions and no itching.  VAGINAL: No relaxation, no itching, no discharge, no abnormal bleeding and no lesions.  ABDOMEN: No abdominal pain. Denies nausea. Denies vomiting. No diarrhea. No constipation  BREAST: Denies pain. No lumps. No discharge.  URINARY: No incontinence, no nocturia, no frequency and no dysuria.  CARDIOVASCULAR: No chest pain. No shortness of breath. No leg cramps.  NEUROLOGICAL: No headaches. No vision changes.    PHYSICAL EXAM:  Exam chaperoned by nurse  ABDOMEN:  Soft, non-tender, non-distended  VULVA:  Normal, no lesions  CERVIX:  Without lesions, polyps or tenderness.  UTERUS:  Normal size, shape, consistency, no mass or tenderness.  ADNEXA:  Normal in size without mass or tenderness    DX:   S/P     Doing well   Instructions / precautions reviewed  Contraceptive counseling        PLAN:  May resume normal activities  Return: in 4 weeks for IUD insertion       Sven Smiley M.D.   OB/GYN    2017

## 2017-08-11 ENCOUNTER — TELEPHONE (OUTPATIENT)
Dept: OBSTETRICS AND GYNECOLOGY | Facility: CLINIC | Age: 22
End: 2017-08-11

## 2017-08-11 NOTE — TELEPHONE ENCOUNTER
Spoke with patient, rescheduled today's appointment, informed her that cost of visit would be $250.00, patient verbalized understanding

## 2017-08-17 ENCOUNTER — TELEPHONE (OUTPATIENT)
Dept: OBSTETRICS AND GYNECOLOGY | Facility: CLINIC | Age: 22
End: 2017-08-17

## 2017-08-17 NOTE — TELEPHONE ENCOUNTER
Spoke with patient, rescheduled appointment for IUD check up, 08/21/2017 at 3:15 pm, patient verbalized understanding

## 2018-02-19 PROBLEM — Z3A.26 26 WEEKS GESTATION OF PREGNANCY: Status: RESOLVED | Noted: 2017-03-09 | Resolved: 2018-02-19

## 2018-05-07 PROBLEM — Z3A.39 39 WEEKS GESTATION OF PREGNANCY: Status: RESOLVED | Noted: 2017-05-30 | Resolved: 2018-05-07

## 2020-04-29 ENCOUNTER — HOSPITAL ENCOUNTER (EMERGENCY)
Facility: HOSPITAL | Age: 25
Discharge: HOME | End: 2020-04-29
Attending: EMERGENCY MEDICINE
Payer: MEDICAID

## 2020-04-29 VITALS
DIASTOLIC BLOOD PRESSURE: 64 MMHG | HEART RATE: 82 BPM | BODY MASS INDEX: 26.06 KG/M2 | WEIGHT: 138 LBS | OXYGEN SATURATION: 100 % | RESPIRATION RATE: 18 BRPM | TEMPERATURE: 99 F | HEIGHT: 61 IN | SYSTOLIC BLOOD PRESSURE: 110 MMHG

## 2020-04-29 DIAGNOSIS — T78.40XA ALLERGIC REACTION, INITIAL ENCOUNTER: Primary | ICD-10-CM

## 2020-04-29 LAB
B-HCG UR QL: NEGATIVE
CTP QC/QA: YES

## 2020-04-29 PROCEDURE — 81025 URINE PREGNANCY TEST: CPT | Performed by: EMERGENCY MEDICINE

## 2020-04-29 PROCEDURE — 25000003 PHARM REV CODE 250: Performed by: EMERGENCY MEDICINE

## 2020-04-29 PROCEDURE — 96372 THER/PROPH/DIAG INJ SC/IM: CPT

## 2020-04-29 PROCEDURE — 99284 EMERGENCY DEPT VISIT MOD MDM: CPT | Mod: 25

## 2020-04-29 PROCEDURE — 63600175 PHARM REV CODE 636 W HCPCS: Performed by: EMERGENCY MEDICINE

## 2020-04-29 RX ORDER — DEXAMETHASONE SODIUM PHOSPHATE 4 MG/ML
8 INJECTION, SOLUTION INTRA-ARTICULAR; INTRALESIONAL; INTRAMUSCULAR; INTRAVENOUS; SOFT TISSUE
Status: COMPLETED | OUTPATIENT
Start: 2020-04-29 | End: 2020-04-29

## 2020-04-29 RX ORDER — PREDNISONE 20 MG/1
40 TABLET ORAL DAILY
Qty: 10 TABLET | Refills: 0 | Status: SHIPPED | OUTPATIENT
Start: 2020-04-29 | End: 2020-05-04

## 2020-04-29 RX ORDER — DIPHENHYDRAMINE HCL 25 MG
25 CAPSULE ORAL
Status: COMPLETED | OUTPATIENT
Start: 2020-04-29 | End: 2020-04-29

## 2020-04-29 RX ADMIN — DEXAMETHASONE SODIUM PHOSPHATE 8 MG: 4 INJECTION, SOLUTION INTRAMUSCULAR; INTRAVENOUS at 04:04

## 2020-04-29 RX ADMIN — DIPHENHYDRAMINE HYDROCHLORIDE 25 MG: 25 CAPSULE ORAL at 04:04

## 2020-04-29 NOTE — ED TRIAGE NOTES
Pt presents to ED for allergic reaction. Pt with noted hives to face, bilat arms and upper to middle back. Pt states she did eat shrimp x3/Hr pta ED. Pt also states. Pt also states she spontaneously began with the reaction 10 min pt ED while working out outside. Pt denies any new shampoo, lotions, detergent. Pt states she has eaten shrimp multiple times with no reaction. Pt denies any sob, throat/toung swelling  or difficulty breathing at this time. bilat breath sounds are clear.

## 2020-04-29 NOTE — ED PROVIDER NOTES
Encounter Date: 4/29/2020    SCRIBE #1 NOTE: I, Natalia Babin, am scribing for, and in the presence of,  Dr. Watson. I have scribed the entire note.       History     Chief Complaint   Patient presents with    Allergic Reaction     c/o hive-like rash to face, neck, and right arm that began suddenly about 10 minutes pta. Pt ate shrimp 1-2 hours ago. Denies oral swelling, sore throat, or SOB.     Time seen by provider: 3:54 PM on 04/29/2020    The patient is a 25 y.o female who presents to the ED due to an allergic reaction that started about 20 minutes PTA. The patient notes swelling to her face and an itchy rash on her neck and upper back. She states she ate shrimp 3 hours prior to symptom onset but has never had problems with seafood in the past. She was exercising when her symptoms began for no apparent reason. The patient denies trouble breathing or swallowing, has not recently changed soaps or detergents, and states she has no known allergies to food or medications.     The history is provided by the patient.     Review of patient's allergies indicates:  No Known Allergies  History reviewed. No pertinent past medical history.  Past Surgical History:   Procedure Laterality Date    breast augmentation       No family history on file.  Social History     Tobacco Use    Smoking status: Never Smoker    Smokeless tobacco: Never Used   Substance Use Topics    Alcohol use: No    Drug use: No     Review of Systems   HENT: Positive for facial swelling. Negative for trouble swallowing.    Respiratory: Negative for shortness of breath.    Skin: Positive for rash (Itchy).   Allergic/Immunologic: Negative for environmental allergies and food allergies.   All other systems reviewed and are negative.      Physical Exam     Initial Vitals [04/29/20 1529]   BP Pulse Resp Temp SpO2   121/72 (!) 123 20 99.1 °F (37.3 °C) 99 %      MAP       --         Physical Exam    Nursing note and vitals reviewed.  Constitutional: She  appears well-developed and well-nourished. She is not diaphoretic. No distress.   HENT:   Head: Normocephalic and atraumatic.   Mouth/Throat: Oropharynx is clear and moist.   Oropharynx without swelling   Eyes: EOM are normal. Pupils are equal, round, and reactive to light.   Neck: Normal range of motion.   Cardiovascular: Regular rhythm and normal heart sounds. Exam reveals no gallop and no friction rub.    No murmur heard.  Pulmonary/Chest: Breath sounds normal. No stridor. No respiratory distress. She has no wheezes. She has no rhonchi. She has no rales.   Musculoskeletal: Normal range of motion. She exhibits no edema or tenderness.   Neurological: She is alert and oriented to person, place, and time. She has normal strength.   Skin: Skin is warm and dry.   Diffuse erythematous urticaria involving the face, torso, and arms    Psychiatric: She has a normal mood and affect. Her behavior is normal. Thought content normal.         ED Course   Procedures  Labs Reviewed - No data to display       Imaging Results    None          Medical Decision Making:   Initial Assessment:   25 year old female presents to the ED complaining of allergic reaction. The patient was seen and examined. The history and physical exam was obtained. The nursing notes and vital signs were reviewed. Will administer Dexamethasone and Diphenhydramine and reassess.   ED Management:  25-year-old female with allergic urticaria.  Patient was given a shot of Decadron here in the ED as well as 25 mg of Benadryl orally.  There is no airway involvement.  I will place on a short course of prednisone and advised to take Benadryl every 6 hr.  She will return to the ED if symptoms worsen, otherwise [facility]                                 Clinical Impression:     1. Allergic reaction, initial encounter                    I, Dr. Tony Qiu, personally performed the services described in this documentation. All  medical record entries made by the scribe were at my direction and in my presence. I have reviewed the chart and agree that the record reflects my personal performance and is accurate and complete. Tony Watson MD.  6:41 AM 05/01/2020               Tony Watson MD  05/01/20 0643

## 2020-08-18 ENCOUNTER — OFFICE VISIT (OUTPATIENT)
Dept: FAMILY MEDICINE | Facility: HOSPITAL | Age: 25
End: 2020-08-18
Attending: FAMILY MEDICINE
Payer: MEDICAID

## 2020-08-18 ENCOUNTER — LAB VISIT (OUTPATIENT)
Dept: LAB | Facility: HOSPITAL | Age: 25
End: 2020-08-18
Attending: FAMILY MEDICINE
Payer: MEDICAID

## 2020-08-18 VITALS
WEIGHT: 126.75 LBS | HEIGHT: 61 IN | HEART RATE: 90 BPM | DIASTOLIC BLOOD PRESSURE: 67 MMHG | SYSTOLIC BLOOD PRESSURE: 105 MMHG | BODY MASS INDEX: 23.93 KG/M2

## 2020-08-18 DIAGNOSIS — R41.3 MEMORY LOSS: ICD-10-CM

## 2020-08-18 DIAGNOSIS — Z13.220 SCREENING FOR LIPID DISORDERS: ICD-10-CM

## 2020-08-18 DIAGNOSIS — R41.3 MEMORY LOSS: Primary | ICD-10-CM

## 2020-08-18 LAB
BASOPHILS # BLD AUTO: 0.03 K/UL (ref 0–0.2)
BASOPHILS NFR BLD: 0.4 % (ref 0–1.9)
CHOLEST SERPL-MCNC: 166 MG/DL (ref 120–199)
CHOLEST/HDLC SERPL: 3.5 {RATIO} (ref 2–5)
DIFFERENTIAL METHOD: ABNORMAL
EOSINOPHIL # BLD AUTO: 0.1 K/UL (ref 0–0.5)
EOSINOPHIL NFR BLD: 1 % (ref 0–8)
ERYTHROCYTE [DISTWIDTH] IN BLOOD BY AUTOMATED COUNT: 12.7 % (ref 11.5–14.5)
HCT VFR BLD AUTO: 35.8 % (ref 37–48.5)
HDLC SERPL-MCNC: 47 MG/DL (ref 40–75)
HDLC SERPL: 28.3 % (ref 20–50)
HGB BLD-MCNC: 11.9 G/DL (ref 12–16)
IMM GRANULOCYTES # BLD AUTO: 0.02 K/UL (ref 0–0.04)
IMM GRANULOCYTES NFR BLD AUTO: 0.3 % (ref 0–0.5)
LDLC SERPL CALC-MCNC: 111.8 MG/DL (ref 63–159)
LYMPHOCYTES # BLD AUTO: 2.5 K/UL (ref 1–4.8)
LYMPHOCYTES NFR BLD: 36.3 % (ref 18–48)
MCH RBC QN AUTO: 31.8 PG (ref 27–31)
MCHC RBC AUTO-ENTMCNC: 33.2 G/DL (ref 32–36)
MCV RBC AUTO: 96 FL (ref 82–98)
MONOCYTES # BLD AUTO: 0.4 K/UL (ref 0.3–1)
MONOCYTES NFR BLD: 6.3 % (ref 4–15)
NEUTROPHILS # BLD AUTO: 3.8 K/UL (ref 1.8–7.7)
NEUTROPHILS NFR BLD: 55.7 % (ref 38–73)
NONHDLC SERPL-MCNC: 119 MG/DL
NRBC BLD-RTO: 0 /100 WBC
PLATELET # BLD AUTO: 300 K/UL (ref 150–350)
PMV BLD AUTO: 10.5 FL (ref 9.2–12.9)
RBC # BLD AUTO: 3.74 M/UL (ref 4–5.4)
T4 FREE SERPL-MCNC: 0.94 NG/DL (ref 0.71–1.51)
TRIGL SERPL-MCNC: 36 MG/DL (ref 30–150)
TSH SERPL DL<=0.005 MIU/L-ACNC: 0.33 UIU/ML (ref 0.4–4)
WBC # BLD AUTO: 6.78 K/UL (ref 3.9–12.7)

## 2020-08-18 PROCEDURE — 82607 VITAMIN B-12: CPT

## 2020-08-18 PROCEDURE — 84443 ASSAY THYROID STIM HORMONE: CPT

## 2020-08-18 PROCEDURE — 85025 COMPLETE CBC W/AUTO DIFF WBC: CPT

## 2020-08-18 PROCEDURE — 86703 HIV-1/HIV-2 1 RESULT ANTBDY: CPT

## 2020-08-18 PROCEDURE — 99213 OFFICE O/P EST LOW 20 MIN: CPT | Performed by: STUDENT IN AN ORGANIZED HEALTH CARE EDUCATION/TRAINING PROGRAM

## 2020-08-18 PROCEDURE — 86803 HEPATITIS C AB TEST: CPT

## 2020-08-18 PROCEDURE — 86592 SYPHILIS TEST NON-TREP QUAL: CPT

## 2020-08-18 PROCEDURE — 84439 ASSAY OF FREE THYROXINE: CPT

## 2020-08-18 PROCEDURE — 36415 COLL VENOUS BLD VENIPUNCTURE: CPT

## 2020-08-18 PROCEDURE — 80061 LIPID PANEL: CPT

## 2020-08-18 PROCEDURE — 82306 VITAMIN D 25 HYDROXY: CPT

## 2020-08-18 NOTE — PROGRESS NOTES
History & Physical  [facility]    Chief Complaint:   Chief Complaint   Patient presents with    Memory Loss        History of Present Illness:    Adeel Kern is a 25 y.o.female who  has no past medical history on file..       Memory loss  -patient reports it has been going on her whole life  -she can not even remember to take care of her kids or feed them  -reports she used to struggle to pay attention in school but still somehow did well  -sometimes she forgets her coffee in the microwave  -forgets appts  -after long conversation states she has studied many languages and has enrolled in college here and wants to do well  -states she has never seen a physician for this problem ever  -denies any recent illnesses  -states the memory loss is not new  -very inconsistent in hisotry regarding mem ory loss, unable to give random information about her early life and current life  -patient came to the appt alone, but states she had no idea she had an appt and her family had to constantly remind her about it  -patient states she will do anything to help, including taking medication if needed      History reviewed. No pertinent past medical history.    Past Surgical History:   Procedure Laterality Date    breast augmentation         History reviewed. No pertinent family history.    Social History     Socioeconomic History    Marital status:      Spouse name: Not on file    Number of children: Not on file    Years of education: Not on file    Highest education level: Not on file   Occupational History    Not on file   Social Needs    Financial resource strain: Not on file    Food insecurity     Worry: Not on file     Inability: Not on file    Transportation needs     Medical: Not on file     Non-medical: Not on file   Tobacco Use    Smoking status: Never Smoker    Smokeless tobacco: Never Used   Substance and Sexual Activity    Alcohol use: No    Drug use: No    Sexual activity: Yes      Partners: Male   Lifestyle    Physical activity     Days per week: Not on file     Minutes per session: Not on file    Stress: Not on file   Relationships    Social connections     Talks on phone: Not on file     Gets together: Not on file     Attends Jewish service: Not on file     Active member of club or organization: Not on file     Attends meetings of clubs or organizations: Not on file     Relationship status: Not on file   Other Topics Concern    Not on file   Social History Narrative    Not on file       No current outpatient medications on file.     No current facility-administered medications for this visit.        Review of patient's allergies indicates:  No Known Allergies      Review of Systems    Review of Systems   Constitutional: Negative for chills, fever and weight loss.   Eyes: Negative for blurred vision.   Respiratory: Negative for cough, hemoptysis, sputum production and shortness of breath.    Cardiovascular: Negative for chest pain, palpitations, orthopnea, claudication and leg swelling.   Gastrointestinal: Negative for abdominal pain, blood in stool, constipation, diarrhea, heartburn, melena, nausea and vomiting.   Genitourinary: Negative for dysuria, hematuria and urgency.   Skin: Negative for itching and rash.   Neurological: Negative for dizziness, tingling, weakness and headaches.   Psychiatric/Behavioral: Positive for memory loss. Negative for depression, substance abuse and suicidal ideas. The patient is not nervous/anxious and does not have insomnia.         Objective:    Vital Signs (Most Recent):  Vitals:    08/18/20 1332   BP: 105/67   Pulse: 90     Body mass index is 23.95 kg/m².    Physical Exam:    Physical Exam   Constitutional: She is oriented to person, place, and time and well-developed, well-nourished, and in no distress. No distress.   HENT:   Head: Normocephalic and atraumatic.   Cardiovascular: Normal rate, regular rhythm and intact distal pulses. Exam reveals no  gallop and no friction rub.   No murmur heard.  Pulmonary/Chest: Effort normal and breath sounds normal. No respiratory distress. She has no wheezes. She has no rales. She exhibits no tenderness.   Abdominal: Soft. Bowel sounds are normal. She exhibits no distension. There is no abdominal tenderness.   Neurological: She is alert and oriented to person, place, and time.   Skin: Skin is warm and dry. She is not diaphoretic. No erythema.   Psychiatric: Mood normal. She exhibits abnormal recent memory.   Nursing note and vitals reviewed.       Laboratory:    Reviewed labs    Imaging:  Reviewed       Assessment Plan  Adeel Kern is a 25 y.o. female presenting to clinic     Adeel was seen today for memory loss.    Diagnoses and all orders for this visit:    Memory loss  -     CBC auto differential; Future  -     TSH; Future  -     Vitamin D; Future  -     Vitamin B12; Future  -     HIV 1/2 Ag/Ab (4th Gen); Future  -     Hepatitis C Antibody; Future  -     RPR; Future    Screening for lipid disorders  -     Lipid Panel; Future           Follow Up:     4 weeks    The patient's diagnosis and medications were discussed.    I will review labs and notify patient with results either by mail or contact by phone.      09/16/2020  Vic Camacho M.D.  [facility] PGY-2

## 2020-08-19 LAB
25(OH)D3+25(OH)D2 SERPL-MCNC: 17 NG/ML (ref 30–96)
HCV AB SERPL QL IA: NEGATIVE
HIV 1+2 AB+HIV1 P24 AG SERPL QL IA: NEGATIVE
RPR SER QL: NORMAL
VIT B12 SERPL-MCNC: 506 PG/ML (ref 210–950)

## 2020-09-16 ENCOUNTER — OFFICE VISIT (OUTPATIENT)
Dept: FAMILY MEDICINE | Facility: HOSPITAL | Age: 25
End: 2020-09-16
Attending: SPECIALIST
Payer: MEDICAID

## 2020-09-16 VITALS
HEART RATE: 80 BPM | BODY MASS INDEX: 24.63 KG/M2 | DIASTOLIC BLOOD PRESSURE: 73 MMHG | WEIGHT: 125.44 LBS | HEIGHT: 60 IN | SYSTOLIC BLOOD PRESSURE: 119 MMHG

## 2020-09-16 DIAGNOSIS — E55.9 VITAMIN D DEFICIENCY: ICD-10-CM

## 2020-09-16 DIAGNOSIS — R41.3 MEMORY LOSS: Primary | ICD-10-CM

## 2020-09-16 PROCEDURE — 99213 OFFICE O/P EST LOW 20 MIN: CPT | Performed by: STUDENT IN AN ORGANIZED HEALTH CARE EDUCATION/TRAINING PROGRAM

## 2020-09-16 RX ORDER — ERGOCALCIFEROL 1.25 MG/1
50000 CAPSULE ORAL
Qty: 7 CAPSULE | Refills: 0 | Status: SHIPPED | OUTPATIENT
Start: 2020-09-16 | End: 2020-10-29

## 2020-09-16 NOTE — PROGRESS NOTES
Progress Note  [facility]      Chief Complaint:   Chief Complaint   Patient presents with    Results        Subjective:    Adeel Kern is a 25 y.o.female with no pertinent history who is following up for    Memory loss  -previous labs with evidence of vit d deficiency  -all labs reviewed  -depression screen negative  -forgets events every other day  -now stating not interested in medications  -also stating she is not in school, inconsistent from previous visit  -has a cousin with ADHD and is concerned she may have it  -no family history of early dementia    Review of Systems    Review of Systems   Constitutional: Negative for chills, fever and weight loss.   Eyes: Negative for blurred vision.   Respiratory: Negative for cough, hemoptysis, sputum production and shortness of breath.    Cardiovascular: Negative for chest pain, palpitations, orthopnea, claudication and leg swelling.   Gastrointestinal: Negative for abdominal pain, blood in stool, constipation, diarrhea, heartburn, melena, nausea and vomiting.   Genitourinary: Negative for dysuria, hematuria and urgency.   Skin: Negative for itching and rash.   Neurological: Negative for dizziness, tingling, weakness and headaches.   Psychiatric/Behavioral: Positive for memory loss. Negative for depression, substance abuse and suicidal ideas. The patient is not nervous/anxious and does not have insomnia.         Past medical, past surgical, social, and family history reviewed.    Objective:    /73   Pulse 80   Ht 5' (1.524 m)   Wt 56.9 kg (125 lb 7.1 oz)   LMP 09/09/2020   BMI 24.50 kg/m²     Physical Exam:  Physical Exam   Constitutional: She is oriented to person, place, and time and well-developed, well-nourished, and in no distress. No distress.   HENT:   Head: Normocephalic and atraumatic.   Cardiovascular: Normal rate, regular rhythm and intact distal pulses. Exam reveals no gallop and no friction rub.   No murmur heard.  Pulmonary/Chest:  Effort normal and breath sounds normal. No respiratory distress. She has no wheezes. She has no rales. She exhibits no tenderness.   Abdominal: Soft. Bowel sounds are normal. She exhibits no distension. There is no abdominal tenderness.   Neurological: She is alert and oriented to person, place, and time.   Skin: Skin is warm and dry. She is not diaphoretic. No erythema.   Psychiatric: Mood normal. She exhibits abnormal recent memory.   Nursing note and vitals reviewed.      Laboratory:    Reviewed labs and imaging.      Assessment Plan    Adeel was seen today for results.    Diagnoses and all orders for this visit:    Memory loss  -     Ambulatory referral/consult to Psychiatry; Future    Vitamin D deficiency  -     ergocalciferol (ERGOCALCIFEROL) 50,000 unit Cap; Take 1 capsule (50,000 Units total) by mouth every 7 days. for 7 doses       Recommended patient use alarms, notes and other reminders to help remember things. uGift justin was also recommended.     Follow Up:  6 weeks    The patient's diagnosis and medications were discussed.    I will review labs and notify patient with results either by mail or contact by phone.      09/16/2020  Vic Camacho M.D.  [facility] PGY-2

## 2020-09-22 NOTE — PROGRESS NOTES
I was present in clinic during the visit and assume the primary medical care of this patient.  I discussed the case with Dr. Camacho, and I have reviewed and agree with the assessment and plan.

## 2020-12-16 ENCOUNTER — TELEPHONE (OUTPATIENT)
Dept: OBSTETRICS AND GYNECOLOGY | Facility: CLINIC | Age: 25
End: 2020-12-16

## 2020-12-16 NOTE — TELEPHONE ENCOUNTER
----- Message from Margaret Kern sent at 12/16/2020 10:35 AM CST -----  Contact: [phone]/Self  Type:  Sooner Appointment Request     Caller is requesting a sooner appointment.  Caller declined first available appointment listed below.  Caller will not accept being placed on the waitlist and is requesting a message be sent to doctor.  Name of Caller: pt  When is the first available appointment? 01/07/2021  Symptoms or Reason: personal matter , states its urgent   Would the patient rather a call back or a response via [facility]? Call back  Best Call Back Number: [phone]  Additional Information:

## 2020-12-22 ENCOUNTER — CLINICAL SUPPORT (OUTPATIENT)
Dept: URGENT CARE | Facility: CLINIC | Age: 25
End: 2020-12-22
Payer: MEDICAID

## 2020-12-22 DIAGNOSIS — Z20.822 COVID-19 RULED OUT: Primary | ICD-10-CM

## 2020-12-22 LAB
CTP QC/QA: YES
SARS-COV-2 RDRP RESP QL NAA+PROBE: NEGATIVE

## 2020-12-22 PROCEDURE — 87635: ICD-10-PCS | Mod: QW,S$GLB,, | Performed by: PHYSICIAN ASSISTANT

## 2020-12-22 PROCEDURE — 87635 SARS-COV-2 COVID-19 AMP PRB: CPT | Mod: QW,S$GLB,, | Performed by: PHYSICIAN ASSISTANT

## 2021-02-28 ENCOUNTER — OFFICE VISIT (OUTPATIENT)
Dept: URGENT CARE | Facility: CLINIC | Age: 26
End: 2021-02-28
Payer: MEDICAID

## 2021-02-28 VITALS
OXYGEN SATURATION: 97 % | WEIGHT: 127 LBS | HEIGHT: 62 IN | BODY MASS INDEX: 23.37 KG/M2 | SYSTOLIC BLOOD PRESSURE: 115 MMHG | DIASTOLIC BLOOD PRESSURE: 75 MMHG | RESPIRATION RATE: 16 BRPM | HEART RATE: 105 BPM | TEMPERATURE: 100 F

## 2021-02-28 DIAGNOSIS — N10 ACUTE PYELONEPHRITIS: Primary | ICD-10-CM

## 2021-02-28 DIAGNOSIS — R50.9 FEVER, UNSPECIFIED FEVER CAUSE: ICD-10-CM

## 2021-02-28 LAB
B-HCG UR QL: NEGATIVE
BILIRUB UR QL STRIP: POSITIVE
CTP QC/QA: YES
CTP QC/QA: YES
GLUCOSE UR QL STRIP: NEGATIVE
KETONES UR QL STRIP: NEGATIVE
LEUKOCYTE ESTERASE UR QL STRIP: POSITIVE
PH, POC UA: 5 (ref 5–8)
POC BLOOD, URINE: POSITIVE
POC NITRATES, URINE: POSITIVE
PROT UR QL STRIP: POSITIVE
SARS-COV-2 RDRP RESP QL NAA+PROBE: NEGATIVE
SP GR UR STRIP: 1.02 (ref 1–1.03)
UROBILINOGEN UR STRIP-ACNC: NORMAL (ref 0.1–1.1)

## 2021-02-28 PROCEDURE — U0002: ICD-10-PCS | Mod: QW,S$GLB,, | Performed by: NURSE PRACTITIONER

## 2021-02-28 PROCEDURE — 81003 POCT URINALYSIS, DIPSTICK, AUTOMATED, W/O SCOPE: ICD-10-PCS | Mod: QW,S$GLB,, | Performed by: NURSE PRACTITIONER

## 2021-02-28 PROCEDURE — 87077 CULTURE AEROBIC IDENTIFY: CPT

## 2021-02-28 PROCEDURE — 81003 URINALYSIS AUTO W/O SCOPE: CPT | Mod: QW,S$GLB,, | Performed by: NURSE PRACTITIONER

## 2021-02-28 PROCEDURE — 87088 URINE BACTERIA CULTURE: CPT

## 2021-02-28 PROCEDURE — 99214 OFFICE O/P EST MOD 30 MIN: CPT | Mod: 25,S$GLB,, | Performed by: NURSE PRACTITIONER

## 2021-02-28 PROCEDURE — 87186 SC STD MICRODIL/AGAR DIL: CPT

## 2021-02-28 PROCEDURE — 81025 POCT URINE PREGNANCY: ICD-10-PCS | Mod: S$GLB,,, | Performed by: NURSE PRACTITIONER

## 2021-02-28 PROCEDURE — 99214 PR OFFICE/OUTPT VISIT, EST, LEVL IV, 30-39 MIN: ICD-10-PCS | Mod: 25,S$GLB,, | Performed by: NURSE PRACTITIONER

## 2021-02-28 PROCEDURE — U0002 COVID-19 LAB TEST NON-CDC: HCPCS | Mod: QW,S$GLB,, | Performed by: NURSE PRACTITIONER

## 2021-02-28 PROCEDURE — 81025 URINE PREGNANCY TEST: CPT | Mod: S$GLB,,, | Performed by: NURSE PRACTITIONER

## 2021-02-28 PROCEDURE — 87086 URINE CULTURE/COLONY COUNT: CPT

## 2021-02-28 RX ORDER — CIPROFLOXACIN 500 MG/1
500 TABLET ORAL 2 TIMES DAILY
Qty: 14 TABLET | Refills: 0 | Status: SHIPPED | OUTPATIENT
Start: 2021-02-28 | End: 2021-03-07

## 2021-02-28 RX ORDER — LIDOCAINE HYDROCHLORIDE 10 MG/ML
1 INJECTION INFILTRATION; PERINEURAL
Status: COMPLETED | OUTPATIENT
Start: 2021-02-28 | End: 2021-02-28

## 2021-02-28 RX ORDER — CEFTRIAXONE 1 G/1
1 INJECTION, POWDER, FOR SOLUTION INTRAMUSCULAR; INTRAVENOUS
Status: COMPLETED | OUTPATIENT
Start: 2021-02-28 | End: 2021-02-28

## 2021-02-28 RX ADMIN — CEFTRIAXONE 1 G: 1 INJECTION, POWDER, FOR SOLUTION INTRAMUSCULAR; INTRAVENOUS at 02:02

## 2021-02-28 RX ADMIN — LIDOCAINE HYDROCHLORIDE 1 ML: 10 INJECTION INFILTRATION; PERINEURAL at 02:02

## 2021-03-01 ENCOUNTER — HOSPITAL ENCOUNTER (EMERGENCY)
Facility: HOSPITAL | Age: 26
Discharge: HOME | End: 2021-03-01
Attending: EMERGENCY MEDICINE
Payer: MEDICAID

## 2021-03-01 VITALS
WEIGHT: 125 LBS | SYSTOLIC BLOOD PRESSURE: 108 MMHG | RESPIRATION RATE: 19 BRPM | DIASTOLIC BLOOD PRESSURE: 57 MMHG | TEMPERATURE: 99 F | OXYGEN SATURATION: 100 % | HEIGHT: 62 IN | HEART RATE: 92 BPM | BODY MASS INDEX: 23 KG/M2

## 2021-03-01 DIAGNOSIS — N39.0 URINARY TRACT INFECTION WITH HEMATURIA, SITE UNSPECIFIED: Primary | ICD-10-CM

## 2021-03-01 DIAGNOSIS — R31.9 URINARY TRACT INFECTION WITH HEMATURIA, SITE UNSPECIFIED: Primary | ICD-10-CM

## 2021-03-01 LAB
ALBUMIN SERPL BCP-MCNC: 4.3 G/DL (ref 3.5–5.2)
ALP SERPL-CCNC: 46 U/L (ref 55–135)
ALT SERPL W/O P-5'-P-CCNC: 16 U/L (ref 10–44)
ANION GAP SERPL CALC-SCNC: 9 MMOL/L (ref 8–16)
AST SERPL-CCNC: 19 U/L (ref 10–40)
B-HCG UR QL: NEGATIVE
BASOPHILS # BLD AUTO: 0.03 K/UL (ref 0–0.2)
BASOPHILS NFR BLD: 0.4 % (ref 0–1.9)
BILIRUB SERPL-MCNC: 0.7 MG/DL (ref 0.1–1)
BILIRUB UR QL STRIP: NEGATIVE
BUN SERPL-MCNC: 7 MG/DL (ref 6–20)
CALCIUM SERPL-MCNC: 9 MG/DL (ref 8.7–10.5)
CHLORIDE SERPL-SCNC: 103 MMOL/L (ref 95–110)
CLARITY UR: ABNORMAL
CO2 SERPL-SCNC: 25 MMOL/L (ref 23–29)
COLOR UR: YELLOW
CREAT SERPL-MCNC: 0.7 MG/DL (ref 0.5–1.4)
CTP QC/QA: YES
CTP QC/QA: YES
DIFFERENTIAL METHOD: ABNORMAL
EOSINOPHIL # BLD AUTO: 0 K/UL (ref 0–0.5)
EOSINOPHIL NFR BLD: 0.1 % (ref 0–8)
ERYTHROCYTE [DISTWIDTH] IN BLOOD BY AUTOMATED COUNT: 12.3 % (ref 11.5–14.5)
EST. GFR  (AFRICAN AMERICAN): >60 ML/MIN/1.73 M^2
EST. GFR  (NON AFRICAN AMERICAN): >60 ML/MIN/1.73 M^2
GLUCOSE SERPL-MCNC: 95 MG/DL (ref 70–110)
GLUCOSE UR QL STRIP: NEGATIVE
HCT VFR BLD AUTO: 38.6 % (ref 37–48.5)
HGB BLD-MCNC: 12.5 G/DL (ref 12–16)
HGB UR QL STRIP: ABNORMAL
IMM GRANULOCYTES # BLD AUTO: 0.03 K/UL (ref 0–0.04)
IMM GRANULOCYTES NFR BLD AUTO: 0.4 % (ref 0–0.5)
KETONES UR QL STRIP: NEGATIVE
LACTATE SERPL-SCNC: 0.6 MMOL/L (ref 0.5–2.2)
LACTATE SERPL-SCNC: 0.7 MMOL/L (ref 0.5–2.2)
LEUKOCYTE ESTERASE UR QL STRIP: NEGATIVE
LYMPHOCYTES # BLD AUTO: 1.3 K/UL (ref 1–4.8)
LYMPHOCYTES NFR BLD: 16.1 % (ref 18–48)
MCH RBC QN AUTO: 31.3 PG (ref 27–31)
MCHC RBC AUTO-ENTMCNC: 32.4 G/DL (ref 32–36)
MCV RBC AUTO: 97 FL (ref 82–98)
MICROSCOPIC COMMENT: ABNORMAL
MONOCYTES # BLD AUTO: 0.5 K/UL (ref 0.3–1)
MONOCYTES NFR BLD: 6 % (ref 4–15)
NEUTROPHILS # BLD AUTO: 6.2 K/UL (ref 1.8–7.7)
NEUTROPHILS NFR BLD: 77 % (ref 38–73)
NITRITE UR QL STRIP: NEGATIVE
NRBC BLD-RTO: 0 /100 WBC
PH UR STRIP: 6 [PH] (ref 5–8)
PLATELET # BLD AUTO: 282 K/UL (ref 150–350)
PMV BLD AUTO: 10.3 FL (ref 9.2–12.9)
POTASSIUM SERPL-SCNC: 3.3 MMOL/L (ref 3.5–5.1)
PROT SERPL-MCNC: 8.1 G/DL (ref 6–8.4)
PROT UR QL STRIP: ABNORMAL
RBC # BLD AUTO: 4 M/UL (ref 4–5.4)
RBC #/AREA URNS HPF: 10 /HPF (ref 0–4)
SARS-COV-2 RDRP RESP QL NAA+PROBE: NEGATIVE
SODIUM SERPL-SCNC: 137 MMOL/L (ref 136–145)
SP GR UR STRIP: 1.02 (ref 1–1.03)
URN SPEC COLLECT METH UR: ABNORMAL
UROBILINOGEN UR STRIP-ACNC: NEGATIVE EU/DL
WBC # BLD AUTO: 8.02 K/UL (ref 3.9–12.7)
WBC #/AREA URNS HPF: 30 /HPF (ref 0–5)

## 2021-03-01 PROCEDURE — 87040 BLOOD CULTURE FOR BACTERIA: CPT | Mod: 59

## 2021-03-01 PROCEDURE — 87086 URINE CULTURE/COLONY COUNT: CPT

## 2021-03-01 PROCEDURE — 63600175 PHARM REV CODE 636 W HCPCS: Performed by: EMERGENCY MEDICINE

## 2021-03-01 PROCEDURE — 81000 URINALYSIS NONAUTO W/SCOPE: CPT

## 2021-03-01 PROCEDURE — 25500020 PHARM REV CODE 255: Performed by: EMERGENCY MEDICINE

## 2021-03-01 PROCEDURE — 25000003 PHARM REV CODE 250: Performed by: NURSE PRACTITIONER

## 2021-03-01 PROCEDURE — 83605 ASSAY OF LACTIC ACID: CPT

## 2021-03-01 PROCEDURE — 96365 THER/PROPH/DIAG IV INF INIT: CPT

## 2021-03-01 PROCEDURE — 80053 COMPREHEN METABOLIC PANEL: CPT

## 2021-03-01 PROCEDURE — 25000003 PHARM REV CODE 250: Performed by: EMERGENCY MEDICINE

## 2021-03-01 PROCEDURE — 81025 URINE PREGNANCY TEST: CPT | Performed by: NURSE PRACTITIONER

## 2021-03-01 PROCEDURE — U0002 COVID-19 LAB TEST NON-CDC: HCPCS | Performed by: NURSE PRACTITIONER

## 2021-03-01 PROCEDURE — 96361 HYDRATE IV INFUSION ADD-ON: CPT

## 2021-03-01 PROCEDURE — 99285 EMERGENCY DEPT VISIT HI MDM: CPT | Mod: 25

## 2021-03-01 PROCEDURE — 85025 COMPLETE CBC W/AUTO DIFF WBC: CPT

## 2021-03-01 RX ORDER — ACETAMINOPHEN 500 MG
1000 TABLET ORAL
Status: COMPLETED | OUTPATIENT
Start: 2021-03-01 | End: 2021-03-01

## 2021-03-01 RX ORDER — POTASSIUM CHLORIDE 20 MEQ/1
40 TABLET, EXTENDED RELEASE ORAL
Status: COMPLETED | OUTPATIENT
Start: 2021-03-01 | End: 2021-03-01

## 2021-03-01 RX ADMIN — CEFTRIAXONE SODIUM 1 G: 1 INJECTION, POWDER, FOR SOLUTION INTRAMUSCULAR; INTRAVENOUS at 12:03

## 2021-03-01 RX ADMIN — SODIUM CHLORIDE 1000 ML: 0.9 INJECTION, SOLUTION INTRAVENOUS at 11:03

## 2021-03-01 RX ADMIN — ACETAMINOPHEN 1000 MG: 500 TABLET ORAL at 10:03

## 2021-03-01 RX ADMIN — POTASSIUM CHLORIDE 40 MEQ: 1500 TABLET, EXTENDED RELEASE ORAL at 11:03

## 2021-03-01 RX ADMIN — IOHEXOL 75 ML: 350 INJECTION, SOLUTION INTRAVENOUS at 11:03

## 2021-03-02 ENCOUNTER — TELEPHONE (OUTPATIENT)
Dept: URGENT CARE | Facility: CLINIC | Age: 26
End: 2021-03-02

## 2021-03-02 LAB — BACTERIA UR CULT: NO GROWTH

## 2021-03-03 LAB — BACTERIA UR CULT: ABNORMAL

## 2021-03-04 ENCOUNTER — TELEPHONE (OUTPATIENT)
Dept: URGENT CARE | Facility: CLINIC | Age: 26
End: 2021-03-04

## 2021-03-06 LAB
BACTERIA BLD CULT: NORMAL
BACTERIA BLD CULT: NORMAL

## 2021-03-08 ENCOUNTER — PATIENT MESSAGE (OUTPATIENT)
Dept: FAMILY MEDICINE | Facility: HOSPITAL | Age: 26
End: 2021-03-08

## 2021-05-05 ENCOUNTER — OFFICE VISIT (OUTPATIENT)
Dept: OBSTETRICS AND GYNECOLOGY | Facility: CLINIC | Age: 26
End: 2021-05-05
Payer: MEDICAID

## 2021-05-05 VITALS
DIASTOLIC BLOOD PRESSURE: 60 MMHG | SYSTOLIC BLOOD PRESSURE: 100 MMHG | WEIGHT: 127.44 LBS | BODY MASS INDEX: 23.31 KG/M2

## 2021-05-05 DIAGNOSIS — R35.0 FREQUENT URINATION: Primary | ICD-10-CM

## 2021-05-05 DIAGNOSIS — Z11.3 SCREENING FOR STD (SEXUALLY TRANSMITTED DISEASE): ICD-10-CM

## 2021-05-05 DIAGNOSIS — Z87.440 HX: UTI (URINARY TRACT INFECTION): ICD-10-CM

## 2021-05-05 PROCEDURE — 87625 HPV TYPES 16 & 18 ONLY: CPT | Mod: 59 | Performed by: OBSTETRICS & GYNECOLOGY

## 2021-05-05 PROCEDURE — 99212 OFFICE O/P EST SF 10 MIN: CPT | Mod: PBBFAC,PO | Performed by: OBSTETRICS & GYNECOLOGY

## 2021-05-05 PROCEDURE — 99203 OFFICE O/P NEW LOW 30 MIN: CPT | Mod: S$PBB,,, | Performed by: OBSTETRICS & GYNECOLOGY

## 2021-05-05 PROCEDURE — 87186 SC STD MICRODIL/AGAR DIL: CPT | Performed by: OBSTETRICS & GYNECOLOGY

## 2021-05-05 PROCEDURE — 99203 PR OFFICE/OUTPT VISIT, NEW, LEVL III, 30-44 MIN: ICD-10-PCS | Mod: S$PBB,,, | Performed by: OBSTETRICS & GYNECOLOGY

## 2021-05-05 PROCEDURE — 87086 URINE CULTURE/COLONY COUNT: CPT | Performed by: OBSTETRICS & GYNECOLOGY

## 2021-05-05 PROCEDURE — 99999 PR PBB SHADOW E&M-EST. PATIENT-LVL II: ICD-10-PCS | Mod: PBBFAC,,, | Performed by: OBSTETRICS & GYNECOLOGY

## 2021-05-05 PROCEDURE — 87088 URINE BACTERIA CULTURE: CPT | Performed by: OBSTETRICS & GYNECOLOGY

## 2021-05-05 PROCEDURE — 88175 CYTOPATH C/V AUTO FLUID REDO: CPT | Performed by: OBSTETRICS & GYNECOLOGY

## 2021-05-05 PROCEDURE — 87481 CANDIDA DNA AMP PROBE: CPT | Mod: 59 | Performed by: OBSTETRICS & GYNECOLOGY

## 2021-05-05 PROCEDURE — 87624 HPV HI-RISK TYP POOLED RSLT: CPT | Performed by: OBSTETRICS & GYNECOLOGY

## 2021-05-05 PROCEDURE — 87077 CULTURE AEROBIC IDENTIFY: CPT | Performed by: OBSTETRICS & GYNECOLOGY

## 2021-05-05 PROCEDURE — 99999 PR PBB SHADOW E&M-EST. PATIENT-LVL II: CPT | Mod: PBBFAC,,, | Performed by: OBSTETRICS & GYNECOLOGY

## 2021-05-05 RX ORDER — TERCONAZOLE 4 MG/G
1 CREAM VAGINAL NIGHTLY
Qty: 1 TUBE | Refills: 0 | Status: SHIPPED | OUTPATIENT
Start: 2021-05-05 | End: 2021-05-12

## 2021-05-05 RX ORDER — METRONIDAZOLE 500 MG/1
500 TABLET ORAL EVERY 12 HOURS
Qty: 14 TABLET | Refills: 0 | Status: SHIPPED | OUTPATIENT
Start: 2021-05-05 | End: 2021-05-12

## 2021-05-06 LAB
BACTERIAL VAGINOSIS DNA: POSITIVE
CANDIDA GLABRATA DNA: NEGATIVE
CANDIDA KRUSEI DNA: NEGATIVE
CANDIDA RRNA VAG QL PROBE: NEGATIVE
T VAGINALIS RRNA GENITAL QL PROBE: NEGATIVE

## 2021-05-07 ENCOUNTER — TELEPHONE (OUTPATIENT)
Dept: OBSTETRICS AND GYNECOLOGY | Facility: HOSPITAL | Age: 26
End: 2021-05-07

## 2021-05-07 LAB
C TRACH RRNA SPEC QL NAA+PROBE: NEGATIVE
N GONORRHOEA RRNA SPEC QL NAA+PROBE: NEGATIVE

## 2021-05-08 LAB — BACTERIA UR CULT: ABNORMAL

## 2021-05-12 ENCOUNTER — TELEPHONE (OUTPATIENT)
Dept: OBSTETRICS AND GYNECOLOGY | Facility: HOSPITAL | Age: 26
End: 2021-05-12

## 2021-05-12 RX ORDER — CIPROFLOXACIN 250 MG/1
250 TABLET, FILM COATED ORAL EVERY 12 HOURS
Qty: 10 TABLET | Refills: 0 | Status: SHIPPED | OUTPATIENT
Start: 2021-05-12 | End: 2021-05-15

## 2021-05-19 ENCOUNTER — OFFICE VISIT (OUTPATIENT)
Dept: UROLOGY | Facility: CLINIC | Age: 26
End: 2021-05-19
Payer: MEDICAID

## 2021-05-19 VITALS
TEMPERATURE: 98 F | HEART RATE: 84 BPM | OXYGEN SATURATION: 99 % | SYSTOLIC BLOOD PRESSURE: 110 MMHG | HEIGHT: 62 IN | DIASTOLIC BLOOD PRESSURE: 72 MMHG | BODY MASS INDEX: 23.19 KG/M2 | WEIGHT: 126 LBS

## 2021-05-19 DIAGNOSIS — R10.2 SUPRAPUBIC PRESSURE: ICD-10-CM

## 2021-05-19 DIAGNOSIS — R31.0 GROSS HEMATURIA: ICD-10-CM

## 2021-05-19 DIAGNOSIS — N30.01 ACUTE CYSTITIS WITH HEMATURIA: ICD-10-CM

## 2021-05-19 DIAGNOSIS — R10.32 LLQ ABDOMINAL PAIN: Primary | ICD-10-CM

## 2021-05-19 DIAGNOSIS — R10.9 LEFT FLANK PAIN: ICD-10-CM

## 2021-05-19 LAB
BILIRUB SERPL-MCNC: NORMAL MG/DL
BLOOD URINE, POC: NORMAL
CLARITY, POC UA: NORMAL
COLOR, POC UA: NORMAL
GLUCOSE UR QL STRIP: NORMAL
KETONES UR QL STRIP: NORMAL
LEUKOCYTE ESTERASE URINE, POC: NORMAL
NITRITE, POC UA: NORMAL
PH, POC UA: 7
PROTEIN, POC: NORMAL
SPECIFIC GRAVITY, POC UA: 1.01
UROBILINOGEN, POC UA: 0.2

## 2021-05-19 PROCEDURE — 87186 SC STD MICRODIL/AGAR DIL: CPT | Performed by: NURSE PRACTITIONER

## 2021-05-19 PROCEDURE — 99214 OFFICE O/P EST MOD 30 MIN: CPT | Mod: PBBFAC,PO | Performed by: NURSE PRACTITIONER

## 2021-05-19 PROCEDURE — 99999 PR PBB SHADOW E&M-EST. PATIENT-LVL IV: ICD-10-PCS | Mod: PBBFAC,,, | Performed by: NURSE PRACTITIONER

## 2021-05-19 PROCEDURE — 99204 OFFICE O/P NEW MOD 45 MIN: CPT | Mod: S$PBB,,, | Performed by: NURSE PRACTITIONER

## 2021-05-19 PROCEDURE — 87088 URINE BACTERIA CULTURE: CPT | Performed by: NURSE PRACTITIONER

## 2021-05-19 PROCEDURE — 99204 PR OFFICE/OUTPT VISIT, NEW, LEVL IV, 45-59 MIN: ICD-10-PCS | Mod: S$PBB,,, | Performed by: NURSE PRACTITIONER

## 2021-05-19 PROCEDURE — 87086 URINE CULTURE/COLONY COUNT: CPT | Performed by: NURSE PRACTITIONER

## 2021-05-19 PROCEDURE — 87077 CULTURE AEROBIC IDENTIFY: CPT | Performed by: NURSE PRACTITIONER

## 2021-05-19 PROCEDURE — 99999 PR PBB SHADOW E&M-EST. PATIENT-LVL IV: CPT | Mod: PBBFAC,,, | Performed by: NURSE PRACTITIONER

## 2021-05-19 PROCEDURE — 81002 URINALYSIS NONAUTO W/O SCOPE: CPT | Mod: PBBFAC,PO | Performed by: NURSE PRACTITIONER

## 2021-05-19 RX ORDER — SULFAMETHOXAZOLE AND TRIMETHOPRIM 400; 80 MG/1; MG/1
1 TABLET ORAL 2 TIMES DAILY
Qty: 20 TABLET | Refills: 0 | Status: SHIPPED | OUTPATIENT
Start: 2021-05-19 | End: 2021-05-24 | Stop reason: ALTCHOICE

## 2021-05-20 ENCOUNTER — HOSPITAL ENCOUNTER (OUTPATIENT)
Dept: RADIOLOGY | Facility: HOSPITAL | Age: 26
Discharge: HOME | End: 2021-05-20
Attending: NURSE PRACTITIONER
Payer: MEDICAID

## 2021-05-20 DIAGNOSIS — R10.2 SUPRAPUBIC PRESSURE: ICD-10-CM

## 2021-05-20 DIAGNOSIS — R31.0 GROSS HEMATURIA: ICD-10-CM

## 2021-05-20 DIAGNOSIS — R10.32 LLQ ABDOMINAL PAIN: ICD-10-CM

## 2021-05-20 DIAGNOSIS — R10.9 LEFT FLANK PAIN: ICD-10-CM

## 2021-05-20 LAB
CLINICAL INFO: ABNORMAL
CYTO CVX: ABNORMAL
CYTOLOGIST CVX/VAG CYTO: ABNORMAL
CYTOLOGY CMNT CVX/VAG CYTO-IMP: ABNORMAL
CYTOLOGY PAP THIN PREP EXPLANATION: ABNORMAL
DATE OF PREVIOUS PAP: ABNORMAL
DATE PREVIOUS BX: NO
GEN CATEG CVX/VAG CYTO-IMP: ABNORMAL
LMP START DATE: ABNORMAL
MICROORGANISM CVX/VAG CYTO: ABNORMAL
PATHOLOGIST CVX/VAG CYTO: ABNORMAL
SPECIMEN SOURCE CVX/VAG CYTO: ABNORMAL
STAT OF ADQ CVX/VAG CYTO-IMP: ABNORMAL

## 2021-05-20 PROCEDURE — 74176 CT ABD & PELVIS W/O CONTRAST: CPT | Mod: 26,,, | Performed by: RADIOLOGY

## 2021-05-20 PROCEDURE — 74176 CT ABD & PELVIS W/O CONTRAST: CPT | Mod: TC

## 2021-05-20 PROCEDURE — 74176 CT RENAL STONE STUDY ABD PELVIS WO: ICD-10-PCS | Mod: 26,,, | Performed by: RADIOLOGY

## 2021-05-21 LAB
HPV I/H RISK 4 DNA CVX QL NAA+PROBE: DETECTED
HPV16 DNA CVX QL PROBE+SIG AMP: DETECTED
HPV18 DNA CVX QL PROBE+SIG AMP: NOT DETECTED

## 2021-05-22 ENCOUNTER — PATIENT MESSAGE (OUTPATIENT)
Dept: OBSTETRICS AND GYNECOLOGY | Facility: CLINIC | Age: 26
End: 2021-05-22

## 2021-05-23 LAB — BACTERIA UR CULT: ABNORMAL

## 2021-05-24 DIAGNOSIS — N30.01 ACUTE CYSTITIS WITH HEMATURIA: Primary | ICD-10-CM

## 2021-05-24 RX ORDER — NITROFURANTOIN 25; 75 MG/1; MG/1
100 CAPSULE ORAL 2 TIMES DAILY
Qty: 20 CAPSULE | Refills: 0 | Status: SHIPPED | OUTPATIENT
Start: 2021-05-24 | End: 2021-06-03

## 2021-05-25 ENCOUNTER — PATIENT MESSAGE (OUTPATIENT)
Dept: UROLOGY | Facility: CLINIC | Age: 26
End: 2021-05-25

## 2021-05-25 ENCOUNTER — TELEPHONE (OUTPATIENT)
Dept: UROLOGY | Facility: CLINIC | Age: 26
End: 2021-05-25

## 2021-05-26 ENCOUNTER — PATIENT MESSAGE (OUTPATIENT)
Dept: OBSTETRICS AND GYNECOLOGY | Facility: CLINIC | Age: 26
End: 2021-05-26

## 2021-05-26 ENCOUNTER — TELEPHONE (OUTPATIENT)
Dept: OBSTETRICS AND GYNECOLOGY | Facility: CLINIC | Age: 26
End: 2021-05-26

## 2021-06-23 ENCOUNTER — OFFICE VISIT (OUTPATIENT)
Dept: UROLOGY | Facility: CLINIC | Age: 26
End: 2021-06-23
Payer: MEDICAID

## 2021-06-23 VITALS
SYSTOLIC BLOOD PRESSURE: 116 MMHG | DIASTOLIC BLOOD PRESSURE: 62 MMHG | WEIGHT: 125 LBS | TEMPERATURE: 98 F | BODY MASS INDEX: 23 KG/M2 | OXYGEN SATURATION: 97 % | HEIGHT: 62 IN | HEART RATE: 77 BPM

## 2021-06-23 DIAGNOSIS — Z87.440 HISTORY OF RECURRENT UTIS: ICD-10-CM

## 2021-06-23 DIAGNOSIS — N30.01 ACUTE CYSTITIS WITH HEMATURIA: Primary | ICD-10-CM

## 2021-06-23 PROCEDURE — 99999 PR PBB SHADOW E&M-EST. PATIENT-LVL III: ICD-10-PCS | Mod: PBBFAC,,, | Performed by: NURSE PRACTITIONER

## 2021-06-23 PROCEDURE — 81001 URINALYSIS AUTO W/SCOPE: CPT | Performed by: NURSE PRACTITIONER

## 2021-06-23 PROCEDURE — 99999 PR PBB SHADOW E&M-EST. PATIENT-LVL III: CPT | Mod: PBBFAC,,, | Performed by: NURSE PRACTITIONER

## 2021-06-23 PROCEDURE — 99213 OFFICE O/P EST LOW 20 MIN: CPT | Mod: PBBFAC,PO | Performed by: NURSE PRACTITIONER

## 2021-06-23 PROCEDURE — 99213 PR OFFICE/OUTPT VISIT, EST, LEVL III, 20-29 MIN: ICD-10-PCS | Mod: S$PBB,,, | Performed by: NURSE PRACTITIONER

## 2021-06-23 PROCEDURE — 99213 OFFICE O/P EST LOW 20 MIN: CPT | Mod: S$PBB,,, | Performed by: NURSE PRACTITIONER

## 2021-06-23 PROCEDURE — 87086 URINE CULTURE/COLONY COUNT: CPT | Performed by: NURSE PRACTITIONER

## 2021-06-23 RX ORDER — DEXTROAMPHETAMINE SACCHARATE, AMPHETAMINE ASPARTATE, DEXTROAMPHETAMINE SULFATE AND AMPHETAMINE SULFATE 7.5; 7.5; 7.5; 7.5 MG/1; MG/1; MG/1; MG/1
1 TABLET ORAL 2 TIMES DAILY
COMMUNITY
Start: 2021-06-18 | End: 2024-03-28

## 2021-06-23 RX ORDER — HYDROCODONE BITARTRATE AND ACETAMINOPHEN 5; 325 MG/1; MG/1
TABLET ORAL
COMMUNITY
Start: 2021-06-08 | End: 2024-03-28

## 2021-06-23 RX ORDER — IBUPROFEN 600 MG/1
TABLET ORAL
COMMUNITY
Start: 2021-06-08 | End: 2024-03-28

## 2021-06-24 LAB
BILIRUB UR QL STRIP: NEGATIVE
CLARITY UR REFRACT.AUTO: CLEAR
COLOR UR AUTO: YELLOW
GLUCOSE UR QL STRIP: NEGATIVE
HGB UR QL STRIP: NEGATIVE
KETONES UR QL STRIP: NEGATIVE
LEUKOCYTE ESTERASE UR QL STRIP: NEGATIVE
MICROSCOPIC COMMENT: ABNORMAL
NITRITE UR QL STRIP: NEGATIVE
PH UR STRIP: 5 [PH] (ref 5–8)
PROT UR QL STRIP: NEGATIVE
RBC #/AREA URNS AUTO: 0 /HPF (ref 0–4)
SP GR UR STRIP: 1.01 (ref 1–1.03)
SQUAMOUS #/AREA URNS AUTO: 1 /HPF
URN SPEC COLLECT METH UR: NORMAL
WBC #/AREA URNS AUTO: 6 /HPF (ref 0–5)

## 2021-06-25 ENCOUNTER — TELEPHONE (OUTPATIENT)
Dept: UROLOGY | Facility: CLINIC | Age: 26
End: 2021-06-25

## 2021-06-25 LAB — BACTERIA UR CULT: NO GROWTH

## 2021-07-01 ENCOUNTER — CLINICAL SUPPORT (OUTPATIENT)
Dept: OBSTETRICS AND GYNECOLOGY | Facility: CLINIC | Age: 26
End: 2021-07-01
Payer: MEDICAID

## 2021-07-01 ENCOUNTER — PROCEDURE VISIT (OUTPATIENT)
Dept: OBSTETRICS AND GYNECOLOGY | Facility: CLINIC | Age: 26
End: 2021-07-01
Payer: MEDICAID

## 2021-07-01 DIAGNOSIS — R87.612 LGSIL OF CERVIX OF UNDETERMINED SIGNIFICANCE: ICD-10-CM

## 2021-07-01 DIAGNOSIS — N87.9 CERVICAL DYSPLASIA: Primary | ICD-10-CM

## 2021-07-01 DIAGNOSIS — A63.0 HPV (HUMAN PAPILLOMA VIRUS) ANOGENITAL INFECTION: Primary | ICD-10-CM

## 2021-07-01 PROCEDURE — 88305 TISSUE EXAM BY PATHOLOGIST: CPT | Mod: 26,,, | Performed by: PATHOLOGY

## 2021-07-01 PROCEDURE — 57454 PR COLPOSC,CERVIX W/ADJ VAG,W/BX & CURRETAG: ICD-10-PCS | Mod: S$PBB,,, | Performed by: OBSTETRICS & GYNECOLOGY

## 2021-07-01 PROCEDURE — 57454 BX/CURETT OF CERVIX W/SCOPE: CPT | Mod: S$PBB,,, | Performed by: OBSTETRICS & GYNECOLOGY

## 2021-07-01 PROCEDURE — 99499 UNLISTED E&M SERVICE: CPT | Mod: S$PBB,,, | Performed by: OBSTETRICS & GYNECOLOGY

## 2021-07-01 PROCEDURE — 90471 IMMUNIZATION ADMIN: CPT | Mod: PBBFAC,PO

## 2021-07-01 PROCEDURE — 88305 TISSUE EXAM BY PATHOLOGIST: ICD-10-PCS | Mod: 26,,, | Performed by: PATHOLOGY

## 2021-07-01 PROCEDURE — 88305 TISSUE EXAM BY PATHOLOGIST: CPT | Performed by: PATHOLOGY

## 2021-07-01 PROCEDURE — 99499 NO LOS: ICD-10-PCS | Mod: S$PBB,,, | Performed by: OBSTETRICS & GYNECOLOGY

## 2021-07-01 PROCEDURE — 57455 BIOPSY OF CERVIX W/SCOPE: CPT | Mod: PBBFAC,PO | Performed by: OBSTETRICS & GYNECOLOGY

## 2021-07-07 ENCOUNTER — PATIENT MESSAGE (OUTPATIENT)
Dept: UROLOGY | Facility: CLINIC | Age: 26
End: 2021-07-07

## 2021-07-12 ENCOUNTER — TELEPHONE (OUTPATIENT)
Dept: OBSTETRICS AND GYNECOLOGY | Facility: CLINIC | Age: 26
End: 2021-07-12

## 2021-07-12 ENCOUNTER — PATIENT MESSAGE (OUTPATIENT)
Dept: OBSTETRICS AND GYNECOLOGY | Facility: CLINIC | Age: 26
End: 2021-07-12

## 2021-07-12 LAB
FINAL PATHOLOGIC DIAGNOSIS: NORMAL
GROSS: NORMAL
Lab: NORMAL

## 2021-07-13 ENCOUNTER — TELEPHONE (OUTPATIENT)
Dept: OBSTETRICS AND GYNECOLOGY | Facility: CLINIC | Age: 26
End: 2021-07-13
Payer: MEDICAID

## 2022-06-21 ENCOUNTER — TELEPHONE (OUTPATIENT)
Dept: OBSTETRICS AND GYNECOLOGY | Facility: CLINIC | Age: 27
End: 2022-06-21

## 2022-06-21 ENCOUNTER — OFFICE VISIT (OUTPATIENT)
Dept: OBSTETRICS AND GYNECOLOGY | Facility: CLINIC | Age: 27
End: 2022-06-21
Payer: MEDICAID

## 2022-06-21 VITALS — DIASTOLIC BLOOD PRESSURE: 58 MMHG | BODY MASS INDEX: 23.47 KG/M2 | SYSTOLIC BLOOD PRESSURE: 90 MMHG | WEIGHT: 128.31 LBS

## 2022-06-21 DIAGNOSIS — Z30.430 ENCOUNTER FOR INSERTION OF MIRENA IUD: ICD-10-CM

## 2022-06-21 DIAGNOSIS — Z11.3 SCREENING FOR STD (SEXUALLY TRANSMITTED DISEASE): Primary | ICD-10-CM

## 2022-06-21 DIAGNOSIS — Z97.5 CONTRACEPTIVE DEVICE, INTRAUTERINE: Primary | ICD-10-CM

## 2022-06-21 DIAGNOSIS — Z12.4 CERVICAL CANCER SCREENING: ICD-10-CM

## 2022-06-21 PROCEDURE — 87491 CHLMYD TRACH DNA AMP PROBE: CPT | Mod: 59 | Performed by: OBSTETRICS & GYNECOLOGY

## 2022-06-21 PROCEDURE — 99213 OFFICE O/P EST LOW 20 MIN: CPT | Mod: PBBFAC,PO | Performed by: OBSTETRICS & GYNECOLOGY

## 2022-06-21 PROCEDURE — 87801 DETECT AGNT MULT DNA AMPLI: CPT | Performed by: OBSTETRICS & GYNECOLOGY

## 2022-06-21 PROCEDURE — 99395 PREV VISIT EST AGE 18-39: CPT | Mod: S$PBB,,, | Performed by: OBSTETRICS & GYNECOLOGY

## 2022-06-21 PROCEDURE — 3074F SYST BP LT 130 MM HG: CPT | Mod: CPTII,,, | Performed by: OBSTETRICS & GYNECOLOGY

## 2022-06-21 PROCEDURE — 3078F DIAST BP <80 MM HG: CPT | Mod: CPTII,,, | Performed by: OBSTETRICS & GYNECOLOGY

## 2022-06-21 PROCEDURE — 3078F PR MOST RECENT DIASTOLIC BLOOD PRESSURE < 80 MM HG: ICD-10-PCS | Mod: CPTII,,, | Performed by: OBSTETRICS & GYNECOLOGY

## 2022-06-21 PROCEDURE — 87625 HPV TYPES 16 & 18 ONLY: CPT | Performed by: OBSTETRICS & GYNECOLOGY

## 2022-06-21 PROCEDURE — 1160F PR REVIEW ALL MEDS BY PRESCRIBER/CLIN PHARMACIST DOCUMENTED: ICD-10-PCS | Mod: CPTII,,, | Performed by: OBSTETRICS & GYNECOLOGY

## 2022-06-21 PROCEDURE — 87624 HPV HI-RISK TYP POOLED RSLT: CPT | Performed by: OBSTETRICS & GYNECOLOGY

## 2022-06-21 PROCEDURE — 99395 PR PREVENTIVE VISIT,EST,18-39: ICD-10-PCS | Mod: S$PBB,,, | Performed by: OBSTETRICS & GYNECOLOGY

## 2022-06-21 PROCEDURE — 88175 CYTOPATH C/V AUTO FLUID REDO: CPT | Performed by: OBSTETRICS & GYNECOLOGY

## 2022-06-21 PROCEDURE — 1159F MED LIST DOCD IN RCRD: CPT | Mod: CPTII,,, | Performed by: OBSTETRICS & GYNECOLOGY

## 2022-06-21 PROCEDURE — 3074F PR MOST RECENT SYSTOLIC BLOOD PRESSURE < 130 MM HG: ICD-10-PCS | Mod: CPTII,,, | Performed by: OBSTETRICS & GYNECOLOGY

## 2022-06-21 PROCEDURE — 87591 N.GONORRHOEAE DNA AMP PROB: CPT | Performed by: OBSTETRICS & GYNECOLOGY

## 2022-06-21 PROCEDURE — 3008F BODY MASS INDEX DOCD: CPT | Mod: CPTII,,, | Performed by: OBSTETRICS & GYNECOLOGY

## 2022-06-21 PROCEDURE — 1160F RVW MEDS BY RX/DR IN RCRD: CPT | Mod: CPTII,,, | Performed by: OBSTETRICS & GYNECOLOGY

## 2022-06-21 PROCEDURE — 99999 PR PBB SHADOW E&M-EST. PATIENT-LVL III: CPT | Mod: PBBFAC,,, | Performed by: OBSTETRICS & GYNECOLOGY

## 2022-06-21 PROCEDURE — 87481 CANDIDA DNA AMP PROBE: CPT | Mod: 59 | Performed by: OBSTETRICS & GYNECOLOGY

## 2022-06-21 PROCEDURE — 99999 PR PBB SHADOW E&M-EST. PATIENT-LVL III: ICD-10-PCS | Mod: PBBFAC,,, | Performed by: OBSTETRICS & GYNECOLOGY

## 2022-06-21 PROCEDURE — 3008F PR BODY MASS INDEX (BMI) DOCUMENTED: ICD-10-PCS | Mod: CPTII,,, | Performed by: OBSTETRICS & GYNECOLOGY

## 2022-06-21 PROCEDURE — 1159F PR MEDICATION LIST DOCUMENTED IN MEDICAL RECORD: ICD-10-PCS | Mod: CPTII,,, | Performed by: OBSTETRICS & GYNECOLOGY

## 2022-06-21 RX ORDER — TERCONAZOLE 4 MG/G
1 CREAM VAGINAL NIGHTLY
Qty: 1 EACH | Refills: 0 | Status: SHIPPED | OUTPATIENT
Start: 2022-06-21 | End: 2022-06-28

## 2022-06-21 RX ORDER — TRAZODONE HYDROCHLORIDE 50 MG/1
50 TABLET ORAL NIGHTLY
COMMUNITY
Start: 2022-06-08 | End: 2024-03-28

## 2022-06-21 RX ORDER — ESCITALOPRAM OXALATE 10 MG/1
10 TABLET ORAL DAILY
COMMUNITY
Start: 2022-06-08 | End: 2024-03-28

## 2022-06-21 NOTE — PROGRESS NOTES
GYNECOLOGY  :  Adeel Kern is a 27 y.o.    Here today for  gyn annual visit   Vaginal discharge , odor , itching  Wants to replace Mirena IUD       Normal menstrual cycles , year 5 Mirena IUD   No Hx Abnormal PAP smears  No Hx Abnormal Mammogram         Past Medical History:   Diagnosis Date    Urinary tract infection     Vaginal infection      Past Surgical History:   Procedure Laterality Date    breast augmentation      teeth removal       Family History   Problem Relation Age of Onset    Kidney disease Neg Hx      Social History     Tobacco Use    Smoking status: Never Smoker    Smokeless tobacco: Never Used   Substance Use Topics    Alcohol use: No    Drug use: No     OB History    Para Term  AB Living   2 2 2 0 0 2   SAB IAB Ectopic Multiple Live Births   0 0 0 0 2      # Outcome Date GA Lbr Emerson/2nd Weight Sex Delivery Anes PTL Lv   2 Term 17 38w5d  3.243 kg (7 lb 2.4 oz) M Vag-Spont Spinal, EPI N JAQUAN   1 Term 10/16/12 39w0d   F INDUCTION EPI N JAQUAN       BP (!) 90/58   Wt 58.2 kg (128 lb 4.9 oz)   LMP 2022 (Approximate)   BMI 23.47 kg/m²     Last PAP=   LMP = 21  Last Mammogram = -  Last Colonoscopy  = -      COMPREHENSIVE GYN HISTORY:  G 2 P 2     PAP History: Denies abnormal Paps.  Infection History: Denies STDs. Denies PID.  Benign History: Denies uterine fibroids. Denies ovarian cysts. Denies endometriosis.   Cancer History: Denies cervical cancer. Denies uterine cancer or hyperplasia. Denies ovarian cancer. Denies vulvar cancer or pre-cancer. Denies vaginal cancer or pre-cancer. Denies breast cancer. Denies colon cancer.  Sexual Activity History: ( x ) Yes   ( - )   no   Menstrual History: Age of menarche: (  12 )  years. Every (  30 )       days, flows for (  5 )   days. moderate  flow.  Dysmenorrhea History:  absent  Contraception:  Mirea iud    ROS  GENERAL: Denies significant weight gain or weight loss. Feeling well overall.  SKIN: Denies rash  or lesions.  Normal skin turgor  HEAD: Denies head injury or headache.   NODES: Denies enlarged lymph nodes.   CHEST: Denies chest pain or shortness of breath.   CARDIOVASCULAR: Denies palpitations or left sided chest pain.   ABDOMEN: No abdominal pain,no  diarrhea,constipation  nausea, vomiting or rectal bleeding.   URINARY: normal  Frequency,no  Dysuria or burning on urination.   REPRODUCTIVE: Per HPI   BREASTS: The patient sometimes performs breast self-examination and denies pain, lumps, or nipple discharge.   HEMATOLOGIC: No easy bruisability or excessive bleeding.   MUSCULOSKELETAL: Denies joint pain or swelling.   NEUROLOGIC: Denies syncope or weakness.   PSYCHIATRIC: Denies depression, anxiety or mood swings.    Physical Exam     Constitutional: She is oriented to person, place, and time. She appears well-developed and well-nourished. No distress.   HENT:   Head: Normocephalic.   NECK: Neck symmetric without masses or thyromegaly.  Cardiovascular: Normal rate and regular rhythm.   Pulmonary/Chest: Effort normal and breath sounds normal. No respiratory distress. She has no wheezes.   Breasts: Symmetrical, no skin changes or visible lesions. No palpable masses, nipple discharge or adenopathy bilaterally.  Abdominal: Soft not distended. Bowel sounds are normal. She exhibits   no masses . No tenderness to palpation.   Genitourinary: Pelvic exam was performed with patient supine.   External genitalia normal no lesions.Normal hair distribution   Adequate perineal body,normal urethral meatus   Vagina moist and well rugated without lesions,greenish vaginal discharge    vaginal  Discharge.   Cervix pink and without lesions. No bleeding. No significant cystocele or rectocele.IUD strings seen     Bimanual exam showed uterus normal size, shape and position , mobile and nontender. Adnexa without masses or tenderness. Urethra and bladder normal  Extremities normal no cyanosis ,edema.         A/P Adeel Kern 27 y.o.      Dx=  1- routine gyn visit   2- cervical cancer screen  3- IUD in place          Procedures/Orders:  Pap smear/Mammogram  UA/ UCx/Udip  Affirm  STD testing     Rx terazol    Assessment / Plan:  -s/p normal breast exam       Patient was counseled today on A.C.S. Pap guidelines and recommendations for yearly pelvic exams, mammograms and monthly self breast exams; to see her PCP for other health maintenance, nutrition and weight gain counseling, discussed lab values.  Discussed colonoscopy recommendations every 10 years starting at age 50   Calcium and vit D daily intake     F/u in 1 yr or PRN or to replace Mirena  when approved       Sven Smiley M.D.   OB/GYN

## 2022-06-25 LAB
C TRACH DNA SPEC QL NAA+PROBE: NOT DETECTED
N GONORRHOEA DNA SPEC QL NAA+PROBE: NOT DETECTED

## 2022-06-29 LAB
CLINICAL INFO: ABNORMAL
CYTO CVX: ABNORMAL
CYTOLOGIST CVX/VAG CYTO: ABNORMAL
CYTOLOGIST CVX/VAG CYTO: ABNORMAL
CYTOLOGY CMNT CVX/VAG CYTO-IMP: ABNORMAL
CYTOLOGY PAP THIN PREP EXPLANATION: ABNORMAL
DATE OF PREVIOUS PAP: ABNORMAL
DATE PREVIOUS BX: NO
GEN CATEG CVX/VAG CYTO-IMP: ABNORMAL
HPV I/H RISK 4 DNA CVX QL NAA+PROBE: DETECTED
HPV16 DNA CVX QL PROBE+SIG AMP: NOT DETECTED
HPV18 DNA CVX QL PROBE+SIG AMP: NOT DETECTED
LMP START DATE: ABNORMAL
MICROORGANISM CVX/VAG CYTO: ABNORMAL
PATHOLOGIST CVX/VAG CYTO: ABNORMAL
SERVICE CMNT-IMP: ABNORMAL
SPECIMEN SOURCE CVX/VAG CYTO: ABNORMAL
STAT OF ADQ CVX/VAG CYTO-IMP: ABNORMAL

## 2022-07-02 ENCOUNTER — TELEPHONE (OUTPATIENT)
Dept: OBSTETRICS AND GYNECOLOGY | Facility: HOSPITAL | Age: 27
End: 2022-07-02
Payer: MEDICAID

## 2022-08-02 ENCOUNTER — PROCEDURE VISIT (OUTPATIENT)
Dept: OBSTETRICS AND GYNECOLOGY | Facility: CLINIC | Age: 27
End: 2022-08-02
Payer: MEDICAID

## 2022-08-02 ENCOUNTER — CLINICAL SUPPORT (OUTPATIENT)
Dept: OBSTETRICS AND GYNECOLOGY | Facility: CLINIC | Age: 27
End: 2022-08-02
Payer: MEDICAID

## 2022-08-02 VITALS — BODY MASS INDEX: 23.78 KG/M2 | DIASTOLIC BLOOD PRESSURE: 70 MMHG | WEIGHT: 130 LBS | SYSTOLIC BLOOD PRESSURE: 110 MMHG

## 2022-08-02 DIAGNOSIS — Z30.430 ENCOUNTER FOR INSERTION OF MIRENA IUD: ICD-10-CM

## 2022-08-02 DIAGNOSIS — N87.9 CERVICAL DYSPLASIA: Primary | ICD-10-CM

## 2022-08-02 DIAGNOSIS — Z23 NEED FOR HPV VACCINE: Primary | ICD-10-CM

## 2022-08-02 PROCEDURE — 88305 TISSUE EXAM BY PATHOLOGIST: CPT | Mod: 26,,, | Performed by: PATHOLOGY

## 2022-08-02 PROCEDURE — 58300 PR INSERT INTRAUTERINE DEVICE: ICD-10-PCS | Mod: S$PBB,,, | Performed by: OBSTETRICS & GYNECOLOGY

## 2022-08-02 PROCEDURE — 58300 INSERT INTRAUTERINE DEVICE: CPT | Mod: PBBFAC,PO | Performed by: OBSTETRICS & GYNECOLOGY

## 2022-08-02 PROCEDURE — 88305 TISSUE EXAM BY PATHOLOGIST: ICD-10-PCS | Mod: 26,,, | Performed by: PATHOLOGY

## 2022-08-02 PROCEDURE — 99499 UNLISTED E&M SERVICE: CPT | Mod: S$PBB,,, | Performed by: OBSTETRICS & GYNECOLOGY

## 2022-08-02 PROCEDURE — 58301 REMOVE INTRAUTERINE DEVICE: CPT | Mod: S$PBB,51,, | Performed by: OBSTETRICS & GYNECOLOGY

## 2022-08-02 PROCEDURE — 99499 NO LOS: ICD-10-PCS | Mod: S$PBB,,, | Performed by: OBSTETRICS & GYNECOLOGY

## 2022-08-02 PROCEDURE — 58301 REMOVE INTRAUTERINE DEVICE: CPT | Mod: PBBFAC,PO | Performed by: OBSTETRICS & GYNECOLOGY

## 2022-08-02 PROCEDURE — 88305 TISSUE EXAM BY PATHOLOGIST: CPT | Performed by: PATHOLOGY

## 2022-08-02 PROCEDURE — 57455 BIOPSY OF CERVIX W/SCOPE: CPT | Mod: S$PBB,,, | Performed by: OBSTETRICS & GYNECOLOGY

## 2022-08-02 PROCEDURE — 90471 IMMUNIZATION ADMIN: CPT | Mod: PBBFAC,PO

## 2022-08-02 PROCEDURE — 58301 PR REMOVE, INTRAUTERINE DEVICE: ICD-10-PCS | Mod: S$PBB,51,, | Performed by: OBSTETRICS & GYNECOLOGY

## 2022-08-02 PROCEDURE — 57455 PR COLPOSCOPY,CERVIX W/ADJ VAGINA,W/BX: ICD-10-PCS | Mod: S$PBB,,, | Performed by: OBSTETRICS & GYNECOLOGY

## 2022-08-02 PROCEDURE — 57455 BIOPSY OF CERVIX W/SCOPE: CPT | Mod: PBBFAC,PO | Performed by: OBSTETRICS & GYNECOLOGY

## 2022-08-02 PROCEDURE — 58300 INSERT INTRAUTERINE DEVICE: CPT | Mod: S$PBB,,, | Performed by: OBSTETRICS & GYNECOLOGY

## 2022-08-02 NOTE — PROGRESS NOTES
Here for Gardasil #2 injection, without complaint at this time. HPV VIS sheet given. Administered injection in LEFT deltoid. Reports no pain before or after injection. Advised to wait in lobby 15 minutes after injection and report any adverse reactions. Return to clinic 12/02/2022 at 2:00 PM for next injection. Patient verbalized understanding.    No  needed. Patient states she understands English.

## 2022-08-08 LAB
FINAL PATHOLOGIC DIAGNOSIS: NORMAL
GROSS: NORMAL
Lab: NORMAL

## 2022-08-08 NOTE — PROCEDURES
Adeel Kern is a 27 y.o. female  presents for IUD removal because close to expiration date .      2022    PRE-IUD REMOVAL COUNSELING:  The patient was advised of minimal risks of bleeding and pain and she agrees to proceed.    PROCEDURE:  TIME OUT PERFORMED.  IUD strings were  visualized at the os and grasped. IUD removed with gentle traction.  The patient tolerated the procedure well.    CC: IUD PLACEMENT    DATE: 2022    HPI    Adeel Kern is a 27 y.o. female  presents for an IUD placement.  UPT is negative.        PRE-IUD PLACEMENT COUNSELING:  All contraceptive options were reviewed and the patient chooses an IUD.  The patient's history was reviewed and there are no contraindications to an IUD. The procedure and minimal risks of pain, bleeding, perforation and infection at the insertion and spontaneous expulsion within the first two weeks was discussed. The benefits of amenorrhea and no systemic side effects were explained. All questions were answered and the patient agrees to proceed. Consent was signed (scanned into computer).    EXAM:  Uterine Position: anteverted    PROCEDURE:  TIME OUT PERFORMED.  The cervix visualized with a speculum.  A single tooth tenaculum placed on the anterior lip.  The uterus sounded to 7 cm using sterile technique.  A Mirena IUD was loaded and placed high in uterine fundus without difficulty using sterile technique.  The string was cut to 2cm length from exo cervix.  The tenaculum and speculum were removed. The patient tolerated the procedure well.    ASSESSMENT:  1. Contraception management / IUD insertion.V25.0.    POST IUD PLACEMENT COUNSELING:  Manage post IUD placement pain with NSAIDs, Tylenol or Rx per MedCard.  IUD danger signs and how to check the strings.  Removal in 5 years for Mirena IUD and in 10 years for Copper IUD.    Counseling lasted approximately 15 minutes and all her questions were answered.    FOLLOW-UP: With me for annual visit      Sven Smiley M.D.   OB/GYN    8/8/2022

## 2022-08-08 NOTE — PROCEDURES
COLPOSCOPY:    DATE+8/8/2022  Physician:Sven Smiley    Adeel Kern is a 27 y.o. female who presents for a colposcopy secondary to abnormal pap smear.      UPT is negative.    INDICATIONS: Her most recent papsmear showed: ASCUS with POSITIVE high risk HPV    She does not have a history of abnormal pap smears.      PRE-COLPOSCOPY PROCEDURE COUNSELING:  Discussed the abnormal pap test findings, HPV, need for colposcopy and possible biopsies to determine a diagnosis and plan of care, treatments available, the minimal risks of bleeding and infection with a colposcopy, alternatives to colposcopy and she agrees to proceed.  Patient was given the opportunity to ask questions and verbal consent was obtained.        COLPOSCOPY EXAM:   TIME OUT PERFORMED.     Physical Exam    no visible lesions and acetowhite lesion(s) noted at 12 o'clock    Biopsy was taken at 12 o'clock.  ECC was performed.  Minimal blood loss.        The speculum was removed. The patient tolerated the procedure well.  There were no complications.      IMPRESSION:   Abnormal Pap 795.09    POST COLPOSCOPY COUNSELING:   Manage post colposcopy cramping with NSAIDs, Tylenol or Rx per MedCard.  Avoid anything in vagina (intercourse, douching, tampons) one week after the procedure.  Expect a clumpy blackish vaginal discharge (Monsel's solution) for several days.  Report bleeding heavier than a period, worsening pain, fever > 101.0 F, or foul-smelling vaginal discharge.  HPV vaccine recommended according to FDA age guidelines.  Importance of follow-up stressed.    Counseling lasted approximately 15 minutes and all her questions were answered.    FOLLOW-UP:   Per bx results  Follow up in 2 weeks     Sven Smiley M.D.   OB/GYN

## 2022-12-02 ENCOUNTER — CLINICAL SUPPORT (OUTPATIENT)
Dept: OBSTETRICS AND GYNECOLOGY | Facility: CLINIC | Age: 27
End: 2022-12-02
Payer: MEDICAID

## 2022-12-02 ENCOUNTER — TELEPHONE (OUTPATIENT)
Dept: OBSTETRICS AND GYNECOLOGY | Facility: CLINIC | Age: 27
End: 2022-12-02

## 2022-12-02 DIAGNOSIS — Z23 NEED FOR HPV VACCINE: Primary | ICD-10-CM

## 2022-12-02 PROCEDURE — 90471 IMMUNIZATION ADMIN: CPT | Mod: PBBFAC,PO

## 2022-12-02 NOTE — PROGRESS NOTES
Here for Gardasil #3 injection, without complaint at this time. Administered in LEFT deltoid (injection site per patient request). Reports no pain before or after injection. Advised to wait in lobby 15 minutes after injection and report any adverse reactions. Patient verbalized understanding.    No  needed. Patient states she understands English.

## 2022-12-02 NOTE — LETTER
December 2, 2022      [facility]  [address], [address]  [place] [address]  Phone: [phone]       Patient: Adeel Kern   YOB: 1995  Date of Visit: 12/02/2022    To Whom It May Concern:    Philip Kern  was at [facility] on 12/02/2022. The patient may return to work on 12/05/2022 with no restrictions. If you have any questions or concerns, or if I can be of further assistance, please do not hesitate to contact me.    Sincerely,    Ayla Cho RN

## 2022-12-02 NOTE — TELEPHONE ENCOUNTER
Good Morning Adeel Alvarado is a patient of Dr. Smiley who is coming today to get her last Gardasil vaccine of the series. Do you mind signing the pended order when you have a chance?    Thank you so much,    -Ayla

## 2023-05-10 ENCOUNTER — TELEPHONE (OUTPATIENT)
Dept: FAMILY MEDICINE | Facility: CLINIC | Age: 28
End: 2023-05-10
Payer: MEDICAID

## 2023-05-10 NOTE — TELEPHONE ENCOUNTER
----- Message from Viky Goins sent at 5/10/2023  3:59 PM CDT -----  Type:  Appointment Request        Name of Caller:Pt   When is the first available appointment?N/a   Symptoms:check up  Best Call Back Number:[phone]  Additional Information: pt has medicaid and is looking to schedule an appt with Dr Botello... advised pt of no new medicaid slots and she said she spoke with Dr Botello and was advised she is taking new medicaid pt... please call pt to schedule an appointment

## 2023-05-10 NOTE — TELEPHONE ENCOUNTER
Spoke to pt and informed her that Dr. Rosmery whatley is booked for new Medicaid pts. Pt was offered another location, [facility], but declined.

## 2023-07-13 ENCOUNTER — TELEPHONE (OUTPATIENT)
Dept: OBSTETRICS AND GYNECOLOGY | Facility: CLINIC | Age: 28
End: 2023-07-13
Payer: MEDICAID

## 2023-07-21 ENCOUNTER — OFFICE VISIT (OUTPATIENT)
Dept: OBSTETRICS AND GYNECOLOGY | Facility: CLINIC | Age: 28
End: 2023-07-21
Payer: MEDICAID

## 2023-07-21 VITALS
DIASTOLIC BLOOD PRESSURE: 62 MMHG | BODY MASS INDEX: 24.51 KG/M2 | HEIGHT: 62 IN | SYSTOLIC BLOOD PRESSURE: 100 MMHG | WEIGHT: 133.19 LBS

## 2023-07-21 DIAGNOSIS — Z97.5 IUD (INTRAUTERINE DEVICE) IN PLACE: ICD-10-CM

## 2023-07-21 DIAGNOSIS — Z01.419 WOMEN'S ANNUAL ROUTINE GYNECOLOGICAL EXAMINATION: Primary | ICD-10-CM

## 2023-07-21 DIAGNOSIS — Z11.51 SCREENING FOR HPV (HUMAN PAPILLOMAVIRUS): ICD-10-CM

## 2023-07-21 DIAGNOSIS — N89.8 VAGINAL ODOR: ICD-10-CM

## 2023-07-21 DIAGNOSIS — N94.6 DYSMENORRHEA: ICD-10-CM

## 2023-07-21 DIAGNOSIS — Z12.4 ENCOUNTER FOR PAPANICOLAOU SMEAR FOR CERVICAL CANCER SCREENING: ICD-10-CM

## 2023-07-21 PROCEDURE — 99999 PR PBB SHADOW E&M-EST. PATIENT-LVL III: CPT | Mod: PBBFAC,,, | Performed by: NURSE PRACTITIONER

## 2023-07-21 PROCEDURE — 1159F PR MEDICATION LIST DOCUMENTED IN MEDICAL RECORD: ICD-10-PCS | Mod: CPTII,,, | Performed by: NURSE PRACTITIONER

## 2023-07-21 PROCEDURE — 3008F PR BODY MASS INDEX (BMI) DOCUMENTED: ICD-10-PCS | Mod: CPTII,,, | Performed by: NURSE PRACTITIONER

## 2023-07-21 PROCEDURE — 3078F PR MOST RECENT DIASTOLIC BLOOD PRESSURE < 80 MM HG: ICD-10-PCS | Mod: CPTII,,, | Performed by: NURSE PRACTITIONER

## 2023-07-21 PROCEDURE — 99395 PR PREVENTIVE VISIT,EST,18-39: ICD-10-PCS | Mod: S$PBB,,, | Performed by: NURSE PRACTITIONER

## 2023-07-21 PROCEDURE — 3078F DIAST BP <80 MM HG: CPT | Mod: CPTII,,, | Performed by: NURSE PRACTITIONER

## 2023-07-21 PROCEDURE — 1159F MED LIST DOCD IN RCRD: CPT | Mod: CPTII,,, | Performed by: NURSE PRACTITIONER

## 2023-07-21 PROCEDURE — 99395 PREV VISIT EST AGE 18-39: CPT | Mod: S$PBB,,, | Performed by: NURSE PRACTITIONER

## 2023-07-21 PROCEDURE — 3074F SYST BP LT 130 MM HG: CPT | Mod: CPTII,,, | Performed by: NURSE PRACTITIONER

## 2023-07-21 PROCEDURE — 99999 PR PBB SHADOW E&M-EST. PATIENT-LVL III: ICD-10-PCS | Mod: PBBFAC,,, | Performed by: NURSE PRACTITIONER

## 2023-07-21 PROCEDURE — 88175 CYTOPATH C/V AUTO FLUID REDO: CPT | Performed by: NURSE PRACTITIONER

## 2023-07-21 PROCEDURE — 3008F BODY MASS INDEX DOCD: CPT | Mod: CPTII,,, | Performed by: NURSE PRACTITIONER

## 2023-07-21 PROCEDURE — 81514 NFCT DS BV&VAGINITIS DNA ALG: CPT | Performed by: NURSE PRACTITIONER

## 2023-07-21 PROCEDURE — 99213 OFFICE O/P EST LOW 20 MIN: CPT | Mod: PBBFAC,PN | Performed by: NURSE PRACTITIONER

## 2023-07-21 PROCEDURE — 87624 HPV HI-RISK TYP POOLED RSLT: CPT | Performed by: NURSE PRACTITIONER

## 2023-07-21 PROCEDURE — 3074F PR MOST RECENT SYSTOLIC BLOOD PRESSURE < 130 MM HG: ICD-10-PCS | Mod: CPTII,,, | Performed by: NURSE PRACTITIONER

## 2023-07-21 RX ORDER — CETIRIZINE HYDROCHLORIDE 10 MG/1
10 TABLET ORAL
COMMUNITY
Start: 2023-07-19 | End: 2024-03-28

## 2023-07-21 RX ORDER — FLUTICASONE PROPIONATE 50 MCG
1 SPRAY, SUSPENSION (ML) NASAL
COMMUNITY
Start: 2023-06-13 | End: 2024-03-28

## 2023-07-21 RX ORDER — ERGOCALCIFEROL 1.25 MG/1
50000 CAPSULE ORAL
COMMUNITY
Start: 2023-06-26 | End: 2024-03-28

## 2023-07-21 RX ORDER — DEXTROAMPHETAMINE SULFATE, DEXTROAMPHETAMINE SACCHARATE, AMPHETAMINE SULFATE AND AMPHETAMINE ASPARTATE 2.5; 2.5; 2.5; 2.5 MG/1; MG/1; MG/1; MG/1
CAPSULE, EXTENDED RELEASE ORAL
COMMUNITY
Start: 2023-04-04 | End: 2024-03-28

## 2023-07-21 NOTE — PROGRESS NOTES
CC: Annual  HPI: Pt is a 28 y.o.  female who presents for routine annual exam. She was referred by her sister -Stacey.  She uses Mirena IUD and condoms for contraception. IUD inserted in 8/22. She c/o vaginal odor.  She c/o extreme pain and cramping with her cycle.  The patient participates in regular exercise: yes.  The patient does not smoke.  The patient wears seatbelts.   Pt denies any domestic violence. She will be starting Accutane.          ROS:  GENERAL: Feeling well overall. Denies fever or chills.   SKIN: Denies rash or lesions.   HEAD: Denies head injury or headache.   NODES: Denies enlarged lymph nodes.   CHEST: Denies chest pain or shortness of breath.   CARDIOVASCULAR: Denies palpitations or left sided chest pain.   ABDOMEN: No abdominal pain, constipation, diarrhea, nausea, vomiting or rectal bleeding.   URINARY: No dysuria, hematuria, or burning on urination.  REPRODUCTIVE: See HPI.   BREASTS: Denies pain, lumps, or nipple discharge.   HEMATOLOGIC: No easy bruisability or excessive bleeding.   MUSCULOSKELETAL: Denies joint pain or swelling.   NEUROLOGIC: Denies syncope or weakness.   PSYCHIATRIC: Denies depression, anxiety or mood swings.    PE:   APPEARANCE: Well nourished, well developed, White female in no acute distress.  NODES: no cervical, supraclavicular, or inguinal lymphadenopathy  BREASTS: Symmetrical, no skin changes or visible lesions. No palpable masses, nipple discharge or adenopathy bilaterally. + BL breast reduction and augmentation scars noted   ABDOMEN: Soft. No tenderness or masses. No distention. No hernias palpated. No CVA tenderness.  VULVA: No lesions. Normal external female genitalia.  URETHRAL MEATUS: Normal size and location, no lesions, no prolapse.  URETHRA: No masses, tenderness, or prolapse.  VAGINA: Moist. No lesions or lacerations noted. No abnormal discharge present. No odor present.   CERVIX: No lesions or discharge. No cervical motion tenderness.  IUD strings  visualized at os- 1 cm out.  UTERUS: Normal size, regular shape, mobile, non-tender.  ADNEXA: No tenderness. No fullness or masses palpated in the adnexal regions.   ANUS PERINEUM: Normal.      Diagnosis:  1. Women's annual routine gynecological examination    2. Encounter for Papanicolaou smear for cervical cancer screening    3. Screening for HPV (human papillomavirus)    4. IUD (intrauterine device) in place    5. Vaginal odor    6. Dysmenorrhea        Plan:   Pap/ HPV  GCCT  Vaginosis cx  Nuvessa X 1 for suspected BV  Pelvic US for IUD check   Discussed to use her prescribed Motrin as needed for dysmenorrhea     Orders Placed This Encounter    HPV High Risk Genotypes, PCR    VAGINOSIS SCREEN BY DNA PROBE    US Pelvis Comp with Transvag NON-OB (xpd    POCT Urine Pregnancy    Liquid-Based Pap Smear, Screening       Patient was counseled today on the new ACS guidelines for cervical cytology screening as well as the current recommendations for breast cancer screening. She was counseled to follow up with her PCP for other routine health maintenance. Counseling session lasted approximately 10 minutes, and all her questions were answered.    Follow-up with me in 1 year for routine exam    CAREN Levy

## 2023-07-24 ENCOUNTER — PATIENT MESSAGE (OUTPATIENT)
Dept: OBSTETRICS AND GYNECOLOGY | Facility: CLINIC | Age: 28
End: 2023-07-24
Payer: MEDICAID

## 2023-07-24 ENCOUNTER — HOSPITAL ENCOUNTER (OUTPATIENT)
Dept: RADIOLOGY | Facility: HOSPITAL | Age: 28
Discharge: HOME | End: 2023-07-24
Attending: NURSE PRACTITIONER
Payer: MEDICAID

## 2023-07-24 DIAGNOSIS — N94.6 DYSMENORRHEA: ICD-10-CM

## 2023-07-24 DIAGNOSIS — Z97.5 IUD (INTRAUTERINE DEVICE) IN PLACE: ICD-10-CM

## 2023-07-24 PROCEDURE — 76856 US EXAM PELVIC COMPLETE: CPT | Mod: 26,,, | Performed by: RADIOLOGY

## 2023-07-24 PROCEDURE — 76830 TRANSVAGINAL US NON-OB: CPT | Mod: 26,,, | Performed by: RADIOLOGY

## 2023-07-24 PROCEDURE — 76856 US EXAM PELVIC COMPLETE: CPT | Mod: TC

## 2023-07-24 PROCEDURE — 76830 US PELVIS COMP WITH TRANSVAG NON-OB (XPD): ICD-10-PCS | Mod: 26,,, | Performed by: RADIOLOGY

## 2023-07-24 PROCEDURE — 76856 US PELVIS COMP WITH TRANSVAG NON-OB (XPD): ICD-10-PCS | Mod: 26,,, | Performed by: RADIOLOGY

## 2023-07-27 LAB
HPV HR 12 DNA SPEC QL NAA+PROBE: NEGATIVE
HPV16 AG SPEC QL: NEGATIVE
HPV18 DNA SPEC QL NAA+PROBE: NEGATIVE

## 2023-07-28 LAB
FINAL PATHOLOGIC DIAGNOSIS: NORMAL
Lab: NORMAL

## 2023-08-02 ENCOUNTER — HOSPITAL ENCOUNTER (EMERGENCY)
Facility: HOSPITAL | Age: 28
Discharge: HOME | End: 2023-08-02
Attending: EMERGENCY MEDICINE
Payer: MEDICAID

## 2023-08-02 VITALS
BODY MASS INDEX: 23 KG/M2 | WEIGHT: 125 LBS | DIASTOLIC BLOOD PRESSURE: 66 MMHG | SYSTOLIC BLOOD PRESSURE: 109 MMHG | HEIGHT: 62 IN | OXYGEN SATURATION: 99 % | TEMPERATURE: 98 F | HEART RATE: 77 BPM | RESPIRATION RATE: 18 BRPM

## 2023-08-02 DIAGNOSIS — G43.909 MIGRAINE WITHOUT STATUS MIGRAINOSUS, NOT INTRACTABLE, UNSPECIFIED MIGRAINE TYPE: ICD-10-CM

## 2023-08-02 DIAGNOSIS — F41.9 ANXIETY: ICD-10-CM

## 2023-08-02 DIAGNOSIS — R42 DIZZINESS: Primary | ICD-10-CM

## 2023-08-02 LAB
B-HCG UR QL: NEGATIVE
CTP QC/QA: YES

## 2023-08-02 PROCEDURE — 96374 THER/PROPH/DIAG INJ IV PUSH: CPT

## 2023-08-02 PROCEDURE — 99285 EMERGENCY DEPT VISIT HI MDM: CPT | Mod: 25

## 2023-08-02 PROCEDURE — 63600175 PHARM REV CODE 636 W HCPCS: Performed by: PHYSICIAN ASSISTANT

## 2023-08-02 PROCEDURE — 93010 ELECTROCARDIOGRAM REPORT: CPT | Mod: ,,, | Performed by: INTERNAL MEDICINE

## 2023-08-02 PROCEDURE — 81025 URINE PREGNANCY TEST: CPT | Performed by: PHYSICIAN ASSISTANT

## 2023-08-02 PROCEDURE — 93010 EKG 12-LEAD: ICD-10-PCS | Mod: ,,, | Performed by: INTERNAL MEDICINE

## 2023-08-02 PROCEDURE — 93005 ELECTROCARDIOGRAM TRACING: CPT

## 2023-08-02 RX ORDER — BUTALBITAL, ACETAMINOPHEN AND CAFFEINE 50; 325; 40 MG/1; MG/1; MG/1
1 TABLET ORAL EVERY 4 HOURS PRN
Qty: 20 TABLET | Refills: 0 | Status: SHIPPED | OUTPATIENT
Start: 2023-08-02 | End: 2023-09-01

## 2023-08-02 RX ORDER — DIPHENHYDRAMINE HYDROCHLORIDE 50 MG/ML
25 INJECTION INTRAMUSCULAR; INTRAVENOUS
Status: DISCONTINUED | OUTPATIENT
Start: 2023-08-02 | End: 2023-08-02 | Stop reason: HOSPADM

## 2023-08-02 RX ORDER — KETOROLAC TROMETHAMINE 30 MG/ML
15 INJECTION, SOLUTION INTRAMUSCULAR; INTRAVENOUS
Status: COMPLETED | OUTPATIENT
Start: 2023-08-02 | End: 2023-08-02

## 2023-08-02 RX ORDER — PROCHLORPERAZINE EDISYLATE 5 MG/ML
10 INJECTION INTRAMUSCULAR; INTRAVENOUS ONCE
Status: DISCONTINUED | OUTPATIENT
Start: 2023-08-02 | End: 2023-08-02 | Stop reason: HOSPADM

## 2023-08-02 RX ORDER — ONDANSETRON 4 MG/1
4 TABLET, ORALLY DISINTEGRATING ORAL EVERY 8 HOURS PRN
Qty: 20 TABLET | Refills: 0 | Status: SHIPPED | OUTPATIENT
Start: 2023-08-02 | End: 2024-01-15

## 2023-08-02 RX ADMIN — KETOROLAC TROMETHAMINE 15 MG: 30 INJECTION, SOLUTION INTRAMUSCULAR; INTRAVENOUS at 11:08

## 2023-08-02 NOTE — Clinical Note
"Adeel York" Erlin was seen and treated in our emergency department on 8/2/2023.  She may return to work on 08/03/2023.       If you have any questions or concerns, please don't hesitate to call.      Isa Gonzalez LPN    "

## 2023-08-02 NOTE — ED PROVIDER NOTES
"Encounter Date: 8/2/2023    SCRIBE #1 NOTE: I, Juancho Eric, am scribing for, and in the presence of,  KARYN Rodriguez. I have scribed the following portions of the note - Other sections scribed: HPI, ROS, PE, EKG.       History     Chief Complaint   Patient presents with    Dizziness     Patient arrived via EMS w CC: dizziness ans tingling to her fingertips. She is crying softly and hyperventilating slightly. VSS. Reassurances provided. NIH neg. AAO times 4. Denies anxiety disorder.      Adeel Kern is a 28 y.o. female with no pertinent PMHx, presents to the ED via EMS for dizziness. Patient declines language line : Patient's sister reports that it all started with a "severe" HA. She states that the patient was doing paperwork and that when she looks up, she gets really dizzy and feels like "passing out." Associated symptoms are nausea and numbness in the fingers and legs and states that it felt "stuck." She further reports that the patient was "sensitive to lights" at home. She denies hx of migraines and reports that she visited her PCP about these symptoms and was told that it was sinus related. She was prescribed medications that provided no relief. She also notes that she drank a caffeinated tea and reports that the she possibly "drank too much caffeine." No medications taken PTA. No alleviating or exacerbating factors noted. Denies any associated symptoms.      The history is provided by the patient. The history is limited by a language barrier. No  was used.     Review of patient's allergies indicates:  No Known Allergies  Past Medical History:   Diagnosis Date    Urinary tract infection     Vaginal infection      Past Surgical History:   Procedure Laterality Date    breast augmentation      teeth removal       Family History   Problem Relation Age of Onset    Kidney disease Neg Hx     Breast cancer Neg Hx     Colon cancer Neg Hx     Ovarian cancer Neg Hx      Social " History     Tobacco Use    Smoking status: Never    Smokeless tobacco: Never   Substance Use Topics    Alcohol use: No    Drug use: No     Review of Systems   Constitutional:  Negative for fever.   HENT:  Negative for sore throat.    Respiratory:  Negative for shortness of breath.    Cardiovascular:  Negative for chest pain.   Gastrointestinal:  Positive for nausea.   Genitourinary:  Negative for dysuria.   Musculoskeletal:  Negative for back pain.   Skin:  Negative for rash.   Neurological:  Positive for dizziness and headaches. Negative for weakness.   Hematological:  Does not bruise/bleed easily.       Physical Exam     Initial Vitals [08/02/23 1031]   BP Pulse Resp Temp SpO2   124/65 100 (!) 24 98.3 °F (36.8 °C) 100 %      MAP       --         Physical Exam    Nursing note and vitals reviewed.  Constitutional: She appears well-developed and well-nourished. She is not diaphoretic. No distress.   HENT:   Head: Normocephalic and atraumatic.   Nose: Nose normal.   Eyes: EOM are normal. Pupils are equal, round, and reactive to light.   Neck: Neck supple. No JVD present.   Normal range of motion.  Cardiovascular:  Normal rate, regular rhythm, normal heart sounds and intact distal pulses.           Pulmonary/Chest: Breath sounds normal. No stridor. No respiratory distress. She has no wheezes. She has no rales.   Abdominal: Abdomen is soft. Bowel sounds are normal. She exhibits no distension. There is no abdominal tenderness.   Musculoskeletal:         General: No tenderness or edema. Normal range of motion.      Cervical back: Normal range of motion and neck supple.     Neurological: She is alert and oriented to person, place, and time. She has normal strength.   Skin: Skin is warm and dry. Capillary refill takes less than 2 seconds. No rash noted. No erythema.         ED Course   Procedures  Labs Reviewed   POCT URINE PREGNANCY     EKG Readings: (Independently Interpreted)   Initial Reading: No STEMI. Rhythm: Normal  Sinus Rhythm. Heart Rate: 85. Other Impression: Low voltage QRS; Incomplete RBBB; Abnormal ECG       Imaging Results              CT Head Without Contrast (Final result)  Result time 08/02/23 11:56:31      Final result by Matthew Stephens III, MD (08/02/23 11:56:31)                   Impression:      No acute process seen.      Electronically signed by: Matthew Stephens MD  Date:    08/02/2023  Time:    11:56               Narrative:    EXAMINATION:  CT HEAD WITHOUT CONTRAST    CLINICAL HISTORY:  Headache, sudden, severe;    FINDINGS:  No bleed, mass, or mass effect seen.  No subarachnoid hemorrhage seen.  The brain parenchyma is unremarkable.  No skull lesion or skull fracture seen.                                       X-Ray Chest AP Portable (Final result)  Result time 08/02/23 11:28:32   Procedure changed from X-Ray Chest PA And Lateral     Final result by Matthew Stephens III, MD (08/02/23 11:28:32)                   Impression:      No acute process seen.      Electronically signed by: Matthew Stephens MD  Date:    08/02/2023  Time:    11:28               Narrative:    EXAMINATION:  XR CHEST AP PORTABLE    CLINICAL HISTORY:  Anxiety;  Anxiety disorder, unspecified    FINDINGS:  Chest one view: Heart size is normal.  Lungs are clear.  The bones are noncontributory.                                       Medications   ketorolac injection 15 mg (15 mg Intravenous Given 8/2/23 1151)     Medical Decision Making:   History:   Old Medical Records: I decided to obtain old medical records.  Old Records Summarized: other records.       <> Summary of Records: External documents reviewed.     Initial Assessment:   CT head negative for intracranial bleed or mass.  Patient's symptoms and exam findings suggestive of migraine headache.  Patient's symptoms improved with Toradol.  Patient is afebrile and nontoxic-appearing.  No neck stiffness or meningeal signs.  I do not suspect meningitis.  I will discharge patient home with  Fioricet and recommend follow-up with neurologist.  Patient is stable for discharge  Independently Interpreted Test(s):   I have ordered and independently interpreted EKG Reading(s) - see summary below       <> Summary of EKG Reading(s): EKG independently interpreted by KARYN Rodriguez reads: See ED course.     Clinical Tests:   Lab Tests: Reviewed and Ordered  Radiological Study: Ordered and Reviewed  Medical Tests: Reviewed and Ordered          Scribe Attestation:   Scribe #1: I performed the above scribed service and the documentation accurately describes the services I performed. I attest to the accuracy of the note.                     Clinical Impression:   Final diagnoses:  [F41.9] Anxiety  [R42] Dizziness (Primary)  [G43.909] Migraine without status migrainosus, not intractable, unspecified migraine type           I, Virginie Michele PA-C , personally performed the services described in this documentation. All medical record entries made by the scribe were at my direction and in my presence. I have reviewed the chart and agree that the record reflects my personal performance and is accurate and complete.         ED Disposition Condition    Discharge Stable          ED Prescriptions       Medication Sig Dispense Start Date End Date Auth. Provider    butalbital-acetaminophen-caffeine -40 mg (FIORICET, ESGIC) -40 mg per tablet Take 1 tablet by mouth every 4 (four) hours as needed for Pain. 20 tablet 8/2/2023 9/1/2023 KARYN Rodriguez    ondansetron (ZOFRAN-ODT) 4 MG TbDL Take 1 tablet (4 mg total) by mouth every 8 (eight) hours as needed. 20 tablet 8/2/2023 -- KARYN Rodriguez          Follow-up Information       Follow up With Specialties Details Why Contact Info Additional Information    [facility]- Neurology Neurology Schedule an appointment as soon as possible for a visit in 2 days  [address]  [facility] [address] [phone] Please park in tomer or  Medical Ofc Bldg surface lot and use Medical Office Bldg elevator. Check in at [address].              KARYN Rodriguez  08/02/23 1914

## 2023-08-16 ENCOUNTER — OFFICE VISIT (OUTPATIENT)
Dept: URGENT CARE | Facility: CLINIC | Age: 28
End: 2023-08-16
Payer: MEDICAID

## 2023-08-16 VITALS
SYSTOLIC BLOOD PRESSURE: 111 MMHG | BODY MASS INDEX: 23 KG/M2 | HEIGHT: 62 IN | RESPIRATION RATE: 20 BRPM | TEMPERATURE: 99 F | OXYGEN SATURATION: 95 % | DIASTOLIC BLOOD PRESSURE: 77 MMHG | WEIGHT: 125 LBS | HEART RATE: 77 BPM

## 2023-08-16 DIAGNOSIS — K04.7 DENTAL INFECTION: ICD-10-CM

## 2023-08-16 DIAGNOSIS — K08.89 PAIN, DENTAL: Primary | ICD-10-CM

## 2023-08-16 PROCEDURE — 99214 OFFICE O/P EST MOD 30 MIN: CPT | Mod: S$GLB,,, | Performed by: NURSE PRACTITIONER

## 2023-08-16 PROCEDURE — 99214 PR OFFICE/OUTPT VISIT, EST, LEVL IV, 30-39 MIN: ICD-10-PCS | Mod: S$GLB,,, | Performed by: NURSE PRACTITIONER

## 2023-08-16 RX ORDER — AMOXICILLIN AND CLAVULANATE POTASSIUM 875; 125 MG/1; MG/1
1 TABLET, FILM COATED ORAL EVERY 12 HOURS
Qty: 20 TABLET | Refills: 0 | Status: SHIPPED | OUTPATIENT
Start: 2023-08-16 | End: 2023-08-26

## 2023-08-16 NOTE — PROGRESS NOTES
"Subjective:      Patient ID: Adeel Kern is a 28 y.o. female.    Vitals:  height is 5' 2" (1.575 m) and weight is 56.7 kg (125 lb). Her temperature is 98.7 °F (37.1 °C). Her blood pressure is 111/77 and her pulse is 77. Her respiration is 20 and oxygen saturation is 95%.     Chief Complaint: Dental Pain    This is a 28 y.o. female who presents today with a chief complaint of sore throat that started two days ago. She said she has no congestion or cough. She has a bump on her right upper side of her gums. She said she is also having ear pain on the right side. She said her glands are also swollen.  denies fever, body aches or chills, denies cough, wheezing or shortness of breath, denies nausea, vomiting, diarrhea or abdominal pain, denies chest pain or dizziness positional lightheadedness, denies sore throat or trouble swallowing, denies loss of taste or smell, or any other symptoms        Sore Throat   This is a new problem. The current episode started yesterday. The problem has been unchanged. There has been no fever. The pain is at a severity of 6/10. The pain is moderate. Associated symptoms include ear pain and swollen glands. Pertinent negatives include no abdominal pain, congestion, coughing, diarrhea, drooling, ear discharge, headaches, hoarse voice, plugged ear sensation, neck pain, shortness of breath, stridor, trouble swallowing or vomiting. She has had no exposure to strep or mono. She has tried acetaminophen for the symptoms. The treatment provided no relief.       HENT:  Positive for ear pain and sore throat. Negative for ear discharge, drooling, congestion and trouble swallowing.    Neck: Negative for neck pain.   Respiratory:  Negative for cough, shortness of breath and stridor.    Gastrointestinal:  Negative for abdominal pain, vomiting and diarrhea.   Neurological:  Negative for headaches.      Objective:     Physical Exam   Constitutional: She is oriented to person, place, and time. She appears " well-developed. She is cooperative.  Non-toxic appearance. She does not appear ill. No distress.   HENT:   Head: Normocephalic and atraumatic.   Ears:   Right Ear: Hearing, tympanic membrane, external ear and ear canal normal.   Left Ear: Hearing, tympanic membrane, external ear and ear canal normal.   Nose: Nose normal. No mucosal edema, rhinorrhea or nasal deformity. No epistaxis. Right sinus exhibits no maxillary sinus tenderness and no frontal sinus tenderness. Left sinus exhibits no maxillary sinus tenderness and no frontal sinus tenderness.   Mouth/Throat: Uvula is midline, oropharynx is clear and moist and mucous membranes are normal. She does not have dentures. No oral lesions. No trismus in the jaw. Normal dentition. No dental abscesses, uvula swelling or dental caries. No oropharyngeal exudate, posterior oropharyngeal edema, posterior oropharyngeal erythema, tonsillar abscesses or cobblestoning.       No visible abscess noted, small approx 0.2 cm mass/nodule noted to right upper palate beside gumline, no erythema to mass/nodule, tenderness noted, no everardo's or trismus, able to tolerate secretions, braces noted      Comments: No visible abscess noted, small approx 0.2 cm mass/nodule noted to right upper palate beside gumline, no erythema to mass/nodule, tenderness noted, no everardo's or trismus, able to tolerate secretions, braces noted  Eyes: Conjunctivae and lids are normal. No scleral icterus.   Neck: Trachea normal and phonation normal. Neck supple. No edema present. No erythema present. No neck rigidity present.   Cardiovascular: Normal rate, regular rhythm, normal heart sounds and normal pulses.   Pulmonary/Chest: Effort normal and breath sounds normal. No respiratory distress. She has no decreased breath sounds. She has no rhonchi.   Abdominal: Normal appearance.   Musculoskeletal: Normal range of motion.         General: No deformity. Normal range of motion.   Lymphadenopathy:     She has cervical  adenopathy.        Right cervical: Superficial cervical adenopathy present.        Left cervical: Superficial cervical adenopathy present.   Neurological: She is alert and oriented to person, place, and time. She exhibits normal muscle tone. Coordination normal.   Skin: Skin is warm, dry, intact, not diaphoretic and not pale.   Psychiatric: Her speech is normal and behavior is normal. Judgment and thought content normal.   Nursing note and vitals reviewed.        Patient in no acute distress.  Vitals reassuring.  Discussed results/diagnosis/plan in depth with patient in clinic. Strict precautions given to patient to monitor for worsening signs and symptoms. Advised to follow up with primary.All questions answered. Strict ER precautions given. If your symptoms worsens or fail to improve you should go to the Emergency Room. Discharge and follow-up instructions given verbally/printed. Discharge and follow-up instructions discussed with the patient who expressed understanding and willingness to comply with my recommendations.Patient voiced understanding and in agreement with current treatment plan.     Please be advised this text was dictated with [employer]ia software and may contain errors due to translation.    Assessment:     1. Pain, dental    2. Dental infection        Plan:       Pain, dental    Dental infection  -     amoxicillin-clavulanate 875-125mg (AUGMENTIN) 875-125 mg per tablet; Take 1 tablet by mouth every 12 (twelve) hours. for 10 days  Dispense: 20 tablet; Refill: 0          Medical Decision Making:   Urgent Care Management:  Patient in no acute distress.  Vitals reassuring.  On exam, patient is nontoxic appearing and afebrile.  Lungs CTA.  Physical examination consistent with possible developing dental infection.  Will cover with Augmentin.  I discussed with patient in detail that she needs to follow-up with dentist for further evaluation.  Medication prescribed and over-the-counter medication discussed  with patient at length.  Proper hydration advised.  I reiterated the importance of further evaluation if no improvement symptoms and follow-up with primary. Patient voiced understanding and in agreement with current treatment plan.             Patient Instructions   PLEASE READ YOUR DISCHARGE INSTRUCTIONS ENTIRELY AS IT CONTAINS IMPORTANT INFORMATION.    Use the mouthwash twice daily    Take the antibiotics to completion    Please see a dentist ASAP for further treatment as the infection can spread to your jaw bone. Ideally in 24-48 hours.    Please return or see your primary care doctor if you develop new or worsening symptoms.     You must understand that you have received an Urgent Care treatment only and that you may be released before all of your medical problems are known or treated.

## 2023-08-16 NOTE — LETTER
August 16, 2023      [facility]  [address] [address]  Phone: [phone]  Fax: [phone]       Patient: Adeel Kern   YOB: 1995  Date of Visit: 08/16/2023    To Whom It May Concern:    Philip Kern  was at [facility] on 08/16/2023. The patient may return to work/school on 08/17/2023 with no restrictions. If you have any questions or concerns, or if I can be of further assistance, please do not hesitate to contact me.    Sincerely,    Malinda Aguirre MA

## 2023-08-16 NOTE — PATIENT INSTRUCTIONS
PLEASE READ YOUR DISCHARGE INSTRUCTIONS ENTIRELY AS IT CONTAINS IMPORTANT INFORMATION.    Use the mouthwash twice daily    Take the antibiotics to completion    Please see a dentist ASAP for further treatment as the infection can spread to your jaw bone. Ideally in 24-48 hours.    Please return or see your primary care doctor if you develop new or worsening symptoms.     You must understand that you have received an Urgent Care treatment only and that you may be released before all of your medical problems are known or treated.

## 2024-01-15 ENCOUNTER — OFFICE VISIT (OUTPATIENT)
Dept: URGENT CARE | Facility: CLINIC | Age: 29
End: 2024-01-15
Payer: MEDICAID

## 2024-01-15 VITALS
OXYGEN SATURATION: 98 % | DIASTOLIC BLOOD PRESSURE: 69 MMHG | SYSTOLIC BLOOD PRESSURE: 105 MMHG | HEART RATE: 110 BPM | HEIGHT: 62 IN | TEMPERATURE: 100 F | BODY MASS INDEX: 23 KG/M2 | RESPIRATION RATE: 17 BRPM | WEIGHT: 125 LBS

## 2024-01-15 DIAGNOSIS — R05.1 ACUTE COUGH: ICD-10-CM

## 2024-01-15 DIAGNOSIS — J10.1 INFLUENZA B: ICD-10-CM

## 2024-01-15 DIAGNOSIS — R50.9 FEVER, UNSPECIFIED FEVER CAUSE: Primary | ICD-10-CM

## 2024-01-15 LAB
CTP QC/QA: YES
POC MOLECULAR INFLUENZA A AGN: NEGATIVE
POC MOLECULAR INFLUENZA B AGN: POSITIVE

## 2024-01-15 PROCEDURE — 87502 INFLUENZA DNA AMP PROBE: CPT | Mod: QW,S$GLB,,

## 2024-01-15 PROCEDURE — 99213 OFFICE O/P EST LOW 20 MIN: CPT | Mod: S$GLB,,,

## 2024-01-15 RX ORDER — PROMETHAZINE HYDROCHLORIDE AND DEXTROMETHORPHAN HYDROBROMIDE 6.25; 15 MG/5ML; MG/5ML
5 SYRUP ORAL EVERY 4 HOURS PRN
Qty: 180 ML | Refills: 0 | Status: SHIPPED | OUTPATIENT
Start: 2024-01-15 | End: 2024-01-22

## 2024-01-15 RX ORDER — BENZONATATE 200 MG/1
200 CAPSULE ORAL 3 TIMES DAILY PRN
Qty: 30 CAPSULE | Refills: 0 | Status: SHIPPED | OUTPATIENT
Start: 2024-01-15 | End: 2024-01-25

## 2024-01-15 NOTE — PROGRESS NOTES
"Subjective:      Patient ID: Adeel Kern is a 28 y.o. female.    Vitals:  height is 5' 2" (1.575 m) and weight is 56.7 kg (125 lb). Her oral temperature is 100.2 °F (37.9 °C). Her blood pressure is 105/69 and her pulse is 110. Her respiration is 17 and oxygen saturation is 98%.     Chief Complaint: Sore Throat    This is a 28 y.o. female who presents today with a chief complaint of fever, cough, sore throat, chills, headaches nausea, vomiting. Patient symptoms started Friday and she is taking tylenol and Advil and Theraflu.    Sore Throat   This is a new problem. The current episode started in the past 7 days. The problem has been unchanged. There has been no fever. The pain is at a severity of 10/10. The pain is severe. Associated symptoms include congestion, coughing, headaches, a hoarse voice, swollen glands, trouble swallowing and vomiting. Pertinent negatives include no diarrhea, drooling, ear discharge, ear pain or shortness of breath.       HENT:  Positive for congestion, sore throat and trouble swallowing. Negative for ear pain, ear discharge and drooling.    Respiratory:  Positive for cough. Negative for shortness of breath.    Gastrointestinal:  Positive for vomiting. Negative for diarrhea.   Neurological:  Positive for headaches. Negative for disorientation and altered mental status.   Psychiatric/Behavioral:  Negative for altered mental status and disorientation.       Objective:     Physical Exam   Constitutional: She is oriented to person, place, and time. She appears well-developed. She is cooperative.  Non-toxic appearance. She does not appear ill. No distress.      Comments:Patient sits comfortably in exam chair. Answers questions in complete sentences. Does not show any signs of distress or discoloration.        HENT:   Head: Normocephalic and atraumatic.   Ears:   Right Ear: Hearing, external ear and ear canal normal. A middle ear effusion is present. impacted cerumen  Left Ear: Hearing, " external ear and ear canal normal. A middle ear effusion is present. impacted cerumen  Nose: Mucosal edema, rhinorrhea and congestion present. No nasal deformity. No epistaxis. Right sinus exhibits no maxillary sinus tenderness and no frontal sinus tenderness. Left sinus exhibits no maxillary sinus tenderness and no frontal sinus tenderness.   Mouth/Throat: Uvula is midline and mucous membranes are normal. No trismus in the jaw. Normal dentition. No uvula swelling. No oropharyngeal exudate, posterior oropharyngeal edema or posterior oropharyngeal erythema. No tonsillar exudate.   Eyes: Conjunctivae and lids are normal. No scleral icterus.   Neck: Trachea normal and phonation normal. Neck supple. No edema present. No erythema present. No neck rigidity present.   Cardiovascular: Normal rate, regular rhythm, normal heart sounds and normal pulses.   Pulmonary/Chest: Effort normal and breath sounds normal. No stridor. No respiratory distress. She has no decreased breath sounds. She has no wheezes. She has no rhonchi. She has no rales.   Abdominal: Normal appearance.   Musculoskeletal: Normal range of motion.         General: No deformity. Normal range of motion.   Lymphadenopathy:     She has no cervical adenopathy.        Right cervical: No superficial cervical, no deep cervical and no posterior cervical adenopathy present.       Left cervical: No superficial cervical, no deep cervical and no posterior cervical adenopathy present.   Neurological: She is alert and oriented to person, place, and time. She exhibits normal muscle tone. Coordination normal.   Skin: Skin is warm, dry, intact, not diaphoretic and not pale.   Psychiatric: Her speech is normal and behavior is normal. Judgment and thought content normal.   Nursing note and vitals reviewed.    Results for orders placed or performed in visit on 01/15/24   POCT Influenza A/B MOLECULAR   Result Value Ref Range    POC Molecular Influenza A Ag Negative Negative, Not  Reported    POC Molecular Influenza B Ag Positive (A) Negative, Not Reported     Acceptable Yes        Assessment:     1. Fever, unspecified fever cause    2. Acute cough    3. Influenza B        Plan:   Patient refused  for the entire exam.     Symptoms started greater than 48 hours ago. Does not qualify for tamiflu.     Fever, unspecified fever cause  -     POCT Influenza A/B MOLECULAR    Acute cough  -     promethazine-dextromethorphan (PROMETHAZINE-DM) 6.25-15 mg/5 mL Syrp; Take 5 mLs by mouth every 4 (four) hours as needed (cough).  Dispense: 180 mL; Refill: 0  -     benzonatate (TESSALON) 200 MG capsule; Take 1 capsule (200 mg total) by mouth 3 (three) times daily as needed for Cough.  Dispense: 30 capsule; Refill: 0    Influenza B             Patient Instructions   You are considered contagious until you have been fever free for over 24 hours without the use of a fever reducer such as tylenol or ibuprofen. You should stay away from other people during this time.     - Rest.    - Drink plenty of fluids. Increasing your fluid intake will help loosen up mucous.  - Viral upper respiratory infections typically run their course in 10-14 days.      - You can take over-the-counter claritin, zyrtec, allegra, OR xyzal as directed. These are antihistamines that can help with runny nose, nasal congestion, sneezing, and helps to dry up post-nasal drip, which usually causes sore throat and cough.    - You can take Delsym to help with cough. This is safe for patients with high blood pressure.     - If you do NOT have high blood pressure, you may use a decongestant form (D)  of this medication (ie. Claritin- D, zyrtec-D, allegra-D) or if you do not take the D form, you can take sudafed (pseudoephedrine) over the counter, which is a decongestant. Do NOT take two decongestant (D) medications at the same time (such as mucinex-D and claritin-D or plain sudafed and claritin D). Dextromethorphan (DM) is a  cough suppressant over the counter (ie. mucinex DM, robitussin, delsym; dayquil/nyquil has DM as well.)     - You can use Flonase (fluticasone) nasal spray as directed for sinus congestion and postnasal drip. This is a steroid nasal spray that works locally over time to decrease the inflammation in your nose/sinuses and help with allergic symptoms. This is not an quick- relief spray like afrin, but it works well if used daily.  Discontinue if you develop nose bleed  - Use nasal saline prior to Flonase.  - Use Ocean Spray Nasal Saline 1-3 puffs each nostril every 2-3 hours then blow out onto tissue. This is to irrigate the nasal passage way to clear the sinus openings. Use until sinus problem resolved.    - A Neti Pot with sterile saline can help break up nasal congestion and give relief.      - Chloraseptic throat spray can help numb the throat.     - Warm salt water gargles can help with sore throat.  - Warm tea with honey can help with sore throat and cough. Honey is a natural cough suppressant.      - Acetaminophen (tylenol) or Ibuprofen (advil,motrin) as directed as needed for fever/pain. Avoid tylenol if you have a history of liver disease. Do not take ibuprofen if you have a history of GI bleeding, kidney disease, or if you take blood thinners.   - Ibuprofen dosing for adults: 400 mg by mouth every 4-6 hours as needed. Max: 2400 mg/day; Info: use lowest effective dose, shortest effective treatment duration; give w/ food if GI upset occurs.  - Tylenol dosing for adults: [By mouth route, immediate-release form] Dose: 325-1000 mg by mouth every 4-6h as needed; Max: 1 g/4h and 4 g/day from all sources. [By mouth route, extended-release form] Dose: 650-1300 mg Extended Release by mouth every 8h as needed; Max: 4 g/day from all sources.     - You must understand that you have received an Urgent Care treatment only and that you may be released before all of your medical problems are known or treated.   - You, the  patient, will arrange for follow up care as instructed.   - If your condition worsens or fails to improve we recommend that you receive another evaluation at the ER immediately or contact your PCP to discuss your concerns or return here.   - Follow up with your PCP or specialty clinic as directed in the next 1-2 weeks if not improved or as needed.  You can call [phone] to schedule an appointment with the appropriate provider.    If your symptoms do not improve or worsen, go to the emergency room immediately.

## 2024-01-15 NOTE — PATIENT INSTRUCTIONS
You are considered contagious until you have been fever free for over 24 hours without the use of a fever reducer such as tylenol or ibuprofen. You should stay away from other people during this time.     - Rest.    - Drink plenty of fluids. Increasing your fluid intake will help loosen up mucous.  - Viral upper respiratory infections typically run their course in 10-14 days.      - You can take over-the-counter claritin, zyrtec, allegra, OR xyzal as directed. These are antihistamines that can help with runny nose, nasal congestion, sneezing, and helps to dry up post-nasal drip, which usually causes sore throat and cough.    - You can take Delsym to help with cough. This is safe for patients with high blood pressure.     - If you do NOT have high blood pressure, you may use a decongestant form (D)  of this medication (ie. Claritin- D, zyrtec-D, allegra-D) or if you do not take the D form, you can take sudafed (pseudoephedrine) over the counter, which is a decongestant. Do NOT take two decongestant (D) medications at the same time (such as mucinex-D and claritin-D or plain sudafed and claritin D). Dextromethorphan (DM) is a cough suppressant over the counter (ie. mucinex DM, robitussin, delsym; dayquil/nyquil has DM as well.)     - You can use Flonase (fluticasone) nasal spray as directed for sinus congestion and postnasal drip. This is a steroid nasal spray that works locally over time to decrease the inflammation in your nose/sinuses and help with allergic symptoms. This is not an quick- relief spray like afrin, but it works well if used daily.  Discontinue if you develop nose bleed  - Use nasal saline prior to Flonase.  - Use Ocean Spray Nasal Saline 1-3 puffs each nostril every 2-3 hours then blow out onto tissue. This is to irrigate the nasal passage way to clear the sinus openings. Use until sinus problem resolved.    - A Neti Pot with sterile saline can help break up nasal congestion and give relief.      -  Chloraseptic throat spray can help numb the throat.     - Warm salt water gargles can help with sore throat.  - Warm tea with honey can help with sore throat and cough. Honey is a natural cough suppressant.      - Acetaminophen (tylenol) or Ibuprofen (advil,motrin) as directed as needed for fever/pain. Avoid tylenol if you have a history of liver disease. Do not take ibuprofen if you have a history of GI bleeding, kidney disease, or if you take blood thinners.   - Ibuprofen dosing for adults: 400 mg by mouth every 4-6 hours as needed. Max: 2400 mg/day; Info: use lowest effective dose, shortest effective treatment duration; give w/ food if GI upset occurs.  - Tylenol dosing for adults: [By mouth route, immediate-release form] Dose: 325-1000 mg by mouth every 4-6h as needed; Max: 1 g/4h and 4 g/day from all sources. [By mouth route, extended-release form] Dose: 650-1300 mg Extended Release by mouth every 8h as needed; Max: 4 g/day from all sources.     - You must understand that you have received an Urgent Care treatment only and that you may be released before all of your medical problems are known or treated.   - You, the patient, will arrange for follow up care as instructed.   - If your condition worsens or fails to improve we recommend that you receive another evaluation at the ER immediately or contact your PCP to discuss your concerns or return here.   - Follow up with your PCP or specialty clinic as directed in the next 1-2 weeks if not improved or as needed.  You can call [phone] to schedule an appointment with the appropriate provider.    If your symptoms do not improve or worsen, go to the emergency room immediately.

## 2024-01-17 ENCOUNTER — TELEPHONE (OUTPATIENT)
Dept: URGENT CARE | Facility: CLINIC | Age: 29
End: 2024-01-17
Payer: MEDICAID

## 2024-01-18 ENCOUNTER — TELEPHONE (OUTPATIENT)
Dept: URGENT CARE | Facility: CLINIC | Age: 29
End: 2024-01-18
Payer: MEDICAID

## 2024-01-18 NOTE — TELEPHONE ENCOUNTER
Spoke with pt regarding her visit on 1/15/2024. Explained why the tamiflu was not given since her symptoms started over 72 hrs prior to her visit. Pt was still experiencing sore throat/fever/HA/cough, offered for the pt to be re-checked with another provider. Pt refused, stating she was on her way to the hospital to be seen. Pt was upset that provider instructed her to return to work the following day. No excuse given. I advised that her discharge instructions stated she should not have returned to work until she was fever free.

## 2024-01-31 ENCOUNTER — TELEPHONE (OUTPATIENT)
Dept: OBSTETRICS AND GYNECOLOGY | Facility: CLINIC | Age: 29
End: 2024-01-31
Payer: MEDICAID

## 2024-01-31 ENCOUNTER — PATIENT MESSAGE (OUTPATIENT)
Dept: OBSTETRICS AND GYNECOLOGY | Facility: CLINIC | Age: 29
End: 2024-01-31
Payer: MEDICAID

## 2024-03-28 ENCOUNTER — OFFICE VISIT (OUTPATIENT)
Dept: OBSTETRICS AND GYNECOLOGY | Facility: CLINIC | Age: 29
End: 2024-03-28
Payer: MEDICAID

## 2024-03-28 VITALS
WEIGHT: 144.81 LBS | SYSTOLIC BLOOD PRESSURE: 104 MMHG | HEIGHT: 62 IN | DIASTOLIC BLOOD PRESSURE: 60 MMHG | BODY MASS INDEX: 26.65 KG/M2

## 2024-03-28 DIAGNOSIS — Z12.31 ENCOUNTER FOR SCREENING MAMMOGRAM FOR BREAST CANCER: ICD-10-CM

## 2024-03-28 DIAGNOSIS — N76.0 ACUTE VAGINITIS: Primary | ICD-10-CM

## 2024-03-28 DIAGNOSIS — Z98.82 BREAST IMPLANT STATUS: ICD-10-CM

## 2024-03-28 PROCEDURE — 1159F MED LIST DOCD IN RCRD: CPT | Mod: CPTII,,, | Performed by: NURSE PRACTITIONER

## 2024-03-28 PROCEDURE — 3078F DIAST BP <80 MM HG: CPT | Mod: CPTII,,, | Performed by: NURSE PRACTITIONER

## 2024-03-28 PROCEDURE — 3074F SYST BP LT 130 MM HG: CPT | Mod: CPTII,,, | Performed by: NURSE PRACTITIONER

## 2024-03-28 PROCEDURE — 99213 OFFICE O/P EST LOW 20 MIN: CPT | Mod: PBBFAC,PN | Performed by: NURSE PRACTITIONER

## 2024-03-28 PROCEDURE — 99999 PR PBB SHADOW E&M-EST. PATIENT-LVL III: CPT | Mod: PBBFAC,,, | Performed by: NURSE PRACTITIONER

## 2024-03-28 PROCEDURE — 99213 OFFICE O/P EST LOW 20 MIN: CPT | Mod: S$PBB,,, | Performed by: NURSE PRACTITIONER

## 2024-03-28 PROCEDURE — 81514 NFCT DS BV&VAGINITIS DNA ALG: CPT | Performed by: NURSE PRACTITIONER

## 2024-03-28 PROCEDURE — 3008F BODY MASS INDEX DOCD: CPT | Mod: CPTII,,, | Performed by: NURSE PRACTITIONER

## 2024-03-28 RX ORDER — METRONIDAZOLE 500 MG/1
500 TABLET ORAL 2 TIMES DAILY
Qty: 14 TABLET | Refills: 1 | Status: SHIPPED | OUTPATIENT
Start: 2024-03-28 | End: 2024-04-04

## 2024-03-28 RX ORDER — LEVONORGESTREL 52 MG/1
INTRAUTERINE DEVICE INTRAUTERINE
COMMUNITY

## 2024-03-28 NOTE — PROGRESS NOTES
CC: Vaginal Discharge    Adeel Kern is a 29 y.o. female  presents with complaint of vaginal discharge for 12 weeks.  She reports itching.  reports odor.  She states the discharge is clear.  Alleviating factors: none. No new sexual partners.    Reports original onset of symptoms after using a different soap while traveling. Reports the symptoms worsen after intercourse.  is not circumcised. No fever. She will be having her implants replaced in [place]. She needs MMG prior.     ROS:  GENERAL: No fever, chills, fatigability or weight loss.  VULVAR: No pain, no lesions and no itching.  VAGINAL: No relaxation, no itching, + discharge, no abnormal bleeding and no lesions.  ABDOMEN: No abdominal pain. Denies nausea. Denies vomiting. No diarrhea. No constipation  BREAST: Denies pain. No lumps. No discharge.  URINARY: No incontinence, no nocturia, no frequency and no dysuria.  CARDIOVASCULAR: No chest pain. No shortness of breath. No leg cramps.  NEUROLOGICAL: No headaches. No vision changes.    PHYSICAL EXAM:  VULVA: normal appearing vulva with no masses, tenderness or lesions   VAGINA: normal appearing vagina with normal color + thin watery discharge, no lesions   CERVIX: normal appearing cervix without discharge or lesions  IUD strings visualized at os- 0.5 cm out.  UTERUS: uterus is normal size, shape, consistency and nontender   ADNEXA: normal adnexa in size, nontender and no masses    ASSESSMENT and PLAN:    ICD-10-CM ICD-9-CM    1. Acute vaginitis  N76.0 616.10 VAGINOSIS SCREEN BY DNA PROBE      metroNIDAZOLE (FLAGYL) 500 MG tablet      boric acid (BORIC ACID) vaginal suppository      2. Encounter for screening mammogram for breast cancer  Z12.31 V76.12 Mammo Digital Screening Bilat w/ Syd      3. Breast implant status  Z98.82 V43.82 Mammo Digital Screening Bilat w/ Syd        Vaginosis cx  Flagyl for suspected BV  Boric Acid Suppositories sent insert nightly at bedtime as needed for 1 -2 nights at  symptom onset to help stabilize the vaginal pH and improve symptoms   MMG for eval of implant status       Patient was counseled today on vaginitis prevention including :  a. avoiding feminine products such as deoderant soaps, body wash, bubble bath, douches, scented toilet paper, deoderant tampons or pads, feminine wipes, chronic pad use, etc.  b. avoiding other vulvovaginal irritants such as long hot baths, humidity, tight, synthetic clothing, chlorine and sitting around in wet bathing suits  c. wearing cotton underwear, avoiding thong underwear and no underwear to bed  d. taking showers instead of baths and use a hair dryer on cool setting afterwards to dry  e. wearing cotton to exercise and shower immediately after exercise and change clothes  f. using polyurethane condoms without spermicide if sexually active and symptoms are triggered by intercourse    FOLLOW UP: PRN lack of improvement.    Denise Duarte, FNP-C

## 2024-05-16 ENCOUNTER — PATIENT MESSAGE (OUTPATIENT)
Dept: OBSTETRICS AND GYNECOLOGY | Facility: CLINIC | Age: 29
End: 2024-05-16
Payer: MEDICAID

## 2024-05-16 ENCOUNTER — TELEPHONE (OUTPATIENT)
Dept: OBSTETRICS AND GYNECOLOGY | Facility: CLINIC | Age: 29
End: 2024-05-16
Payer: MEDICAID

## 2024-05-16 DIAGNOSIS — Z98.82 BREAST IMPLANT STATUS: Primary | ICD-10-CM

## 2024-05-16 NOTE — TELEPHONE ENCOUNTER
----- Message from Yomaira Leblanc MA sent at 5/16/2024  3:07 PM CDT -----    ----- Message -----  From: Irvin Gonzalez  Sent: 5/16/2024   2:51 PM CDT  To: Gerald [facility]GUELLO Staff    Name of Who is Calling: VANESSA WHITEHEAD [[id number]]      What is the request in detail: Pt states she needs an order for a Bilateral breast US placed in the system. Please contact to further discuss and advise.        Can the clinic reply by [facility]: Y      What Number to Call Back if not in [facility]:

## 2024-05-28 ENCOUNTER — OFFICE VISIT (OUTPATIENT)
Dept: PRIMARY CARE CLINIC | Facility: CLINIC | Age: 29
End: 2024-05-28
Payer: MEDICAID

## 2024-05-28 VITALS
SYSTOLIC BLOOD PRESSURE: 116 MMHG | HEART RATE: 77 BPM | DIASTOLIC BLOOD PRESSURE: 68 MMHG | WEIGHT: 142.31 LBS | HEIGHT: 62 IN | TEMPERATURE: 98 F | OXYGEN SATURATION: 100 % | BODY MASS INDEX: 26.19 KG/M2

## 2024-05-28 DIAGNOSIS — Z11.4 ENCOUNTER FOR SCREENING FOR HUMAN IMMUNODEFICIENCY VIRUS (HIV): ICD-10-CM

## 2024-05-28 DIAGNOSIS — Z13.1 ENCOUNTER FOR SCREENING FOR DIABETES MELLITUS: ICD-10-CM

## 2024-05-28 DIAGNOSIS — Z00.00 ENCOUNTER FOR ANNUAL PHYSICAL EXAM: Primary | ICD-10-CM

## 2024-05-28 DIAGNOSIS — Z01.818 PREOPERATIVE CLEARANCE: ICD-10-CM

## 2024-05-28 DIAGNOSIS — Z13.6 ENCOUNTER FOR SCREENING FOR CARDIOVASCULAR DISORDERS: ICD-10-CM

## 2024-05-28 DIAGNOSIS — Z13.29 SCREENING FOR THYROID DISORDER: ICD-10-CM

## 2024-05-28 DIAGNOSIS — Z11.59 NEED FOR HEPATITIS C SCREENING TEST: ICD-10-CM

## 2024-05-28 DIAGNOSIS — Z98.82 HX OF BILATERAL BREAST IMPLANTS: ICD-10-CM

## 2024-05-28 PROBLEM — F90.9 ATTENTION DEFICIT HYPERACTIVITY DISORDER: Status: ACTIVE | Noted: 2023-05-30

## 2024-05-28 PROBLEM — E66.3 OVERWEIGHT WITH BODY MASS INDEX (BMI) 25.0-29.9: Status: ACTIVE | Noted: 2023-06-13

## 2024-05-28 PROBLEM — J10.1 INFLUENZA B: Status: ACTIVE | Noted: 2024-05-28

## 2024-05-28 PROCEDURE — 99213 OFFICE O/P EST LOW 20 MIN: CPT | Mod: PBBFAC,PN

## 2024-05-28 PROCEDURE — 3008F BODY MASS INDEX DOCD: CPT | Mod: CPTII,,,

## 2024-05-28 PROCEDURE — 1159F MED LIST DOCD IN RCRD: CPT | Mod: CPTII,,,

## 2024-05-28 PROCEDURE — 3074F SYST BP LT 130 MM HG: CPT | Mod: CPTII,,,

## 2024-05-28 PROCEDURE — 99385 PREV VISIT NEW AGE 18-39: CPT | Mod: S$PBB,,,

## 2024-05-28 PROCEDURE — 3078F DIAST BP <80 MM HG: CPT | Mod: CPTII,,,

## 2024-05-28 PROCEDURE — 99999 PR PBB SHADOW E&M-EST. PATIENT-LVL III: CPT | Mod: PBBFAC,,,

## 2024-05-28 RX ORDER — ONABOTULINUMTOXINA 100 [USP'U]/1
INJECTION, POWDER, LYOPHILIZED, FOR SOLUTION INTRADERMAL; INTRAMUSCULAR
COMMUNITY
Start: 2024-04-12

## 2024-05-28 NOTE — PROGRESS NOTES
SUBJECTIVE     Chief Complaint   Patient presents with    Establish Care       [facility]  Adeel Kern is a 29 y.o. female with no pertinent medical diagnoses as listed in the medical history and problem list that presents for annual exam and pre-op clearance. Pt is new to me. She has a good appetite and eats well. She does exercise. She sleeps for ~8+ hours nightly. Pt does not take OTC supplements. She does not have any current stressors. Pt is UTD on age appropriate CA screening. Dental exam is UTD. Eye exam is not UTD.     HPI  Patient is scheduled for surgery on 06/20/2024 with Dr. Eric Porter and needs surgical clearance. GYN ordered mammogram and bilateral breast ultrasound. Provider will order remaining labs and test needed for surgical clearance. Patient reports her breast implants are moving into her axillary region. Patient states she is having implants exchanged in [place].     Patient denies any shortness of breath, visual changes, change in LOC, weakness, headaches, chest pains, or palpitations.     PAST MEDICAL HISTORY:  Past Medical History:   Diagnosis Date    Urinary tract infection     Vaginal infection        PAST SURGICAL HISTORY:  Past Surgical History:   Procedure Laterality Date    breast augmentation      teeth removal         SOCIAL HISTORY:  Social History     Socioeconomic History    Marital status:    Tobacco Use    Smoking status: Never    Smokeless tobacco: Never   Substance and Sexual Activity    Alcohol use: No    Drug use: No    Sexual activity: Yes     Partners: Male     Birth control/protection: None, I.U.D.       FAMILY HISTORY:  Family History   Problem Relation Name Age of Onset    Kidney disease Neg Hx      Breast cancer Neg Hx      Colon cancer Neg Hx      Ovarian cancer Neg Hx         ALLERGIES AND MEDICATIONS: updated and reviewed.  Review of patient's allergies indicates:  No Known Allergies  Current Outpatient Medications   Medication Sig Dispense Refill    BOTOX 100 unit  "SolR PROVIDER WILL ADMINISTER 50 UNITS PER AXILLA EVERY 12 WEEKS IN OFFICE      levonorgestreL (MIRENA) 21 mcg/24 hours (8 yrs) 52 mg IUD Mirena (52 MG)       No current facility-administered medications for this visit.       ROS  Review of Systems   Constitutional:  Negative for activity change, chills, fatigue and fever.   HENT:  Negative for congestion, facial swelling, rhinorrhea and sore throat.    Respiratory:  Negative for cough, chest tightness and shortness of breath.    Cardiovascular:  Negative for chest pain and palpitations.   Gastrointestinal:  Negative for abdominal pain, constipation, diarrhea, nausea and vomiting.   Genitourinary:  Negative for dysuria.   Musculoskeletal:  Negative for back pain and joint swelling.   Skin:  Negative for rash and wound.   Neurological:  Negative for dizziness, speech difficulty, light-headedness and headaches.   Psychiatric/Behavioral:  Negative for behavioral problems, dysphoric mood and sleep disturbance. The patient is not nervous/anxious.          OBJECTIVE     Physical Exam  Vitals:    05/28/24 0922   BP: 116/68   Pulse: 77   Temp: 98.4 °F (36.9 °C)    Body mass index is 26.03 kg/m².  Weight: 64.5 kg (142 lb 4.9 oz)   Height: 5' 2" (157.5 cm)     Physical Exam  Vitals reviewed.   Constitutional:       General: She is not in acute distress.  HENT:      Right Ear: External ear normal.      Left Ear: External ear normal.      Nose: Nose normal.      Mouth/Throat:      Mouth: Mucous membranes are moist.   Eyes:      Extraocular Movements: Extraocular movements intact.      Conjunctiva/sclera: Conjunctivae normal.      Pupils: Pupils are equal, round, and reactive to light.   Cardiovascular:      Rate and Rhythm: Normal rate and regular rhythm.      Pulses: Normal pulses.      Heart sounds: Normal heart sounds.   Pulmonary:      Effort: Pulmonary effort is normal.      Breath sounds: Normal breath sounds.   Abdominal:      General: Bowel sounds are normal. There is " no distension.      Palpations: Abdomen is soft.      Tenderness: There is no abdominal tenderness. There is no right CVA tenderness, left CVA tenderness or guarding.   Musculoskeletal:         General: No swelling. Normal range of motion.      Cervical back: Normal range of motion.   Skin:     General: Skin is warm and dry.      Findings: No rash.   Neurological:      General: No focal deficit present.      Mental Status: She is alert and oriented to person, place, and time.   Psychiatric:         Mood and Affect: Mood normal.         Behavior: Behavior normal.       Health Maintenance         Date Due Completion Date    TETANUS VACCINE Never done ---    COVID-19 Vaccine (3 - 2023-24 season) 09/01/2023 5/12/2021    Influenza Vaccine (Season Ended) 09/01/2024 ---    Cervical Cancer Screening 07/21/2028 7/21/2023              ASSESSMENT     29 y.o. female with     1. Encounter for annual physical exam    2. Encounter for screening for diabetes mellitus    3. Encounter for screening for cardiovascular disorders    4. Screening for thyroid disorder    5. Encounter for screening for human immunodeficiency virus (HIV)    6. Preoperative clearance    7. Need for hepatitis C screening test    8. Hx of bilateral breast implants        PLAN:     1. Encounter for annual physical exam  - Discussed age and gender appropriate screenings at this visit and encouraged a healthy diet low in simple carbohydrates, and increased physical activity.  Counseled on medically appropriate vaccines based on age and current health condition.  Screening test reviewed and discussed with patient.      2. Encounter for screening for diabetes mellitus  Due. Ordered.   - Hemoglobin A1C; Future    3. Encounter for screening for cardiovascular disorders  Due. Ordered  - Lipid Panel; Future    4. Screening for thyroid disorder  Due. Ordered.   - TSH; Future  - T3, FREE; Future  - T4, FREE; Future    5. Encounter for screening for human immunodeficiency  virus (HIV)  Due. Ordered.   - HIV 1/2 Ag/Ab (4th Gen); Future    6. Preoperative clearance  Labs and EKG per surgical clearance request. Patient is scheduled for surgery on 06/20/2024.  - EKG 12-lead; Future  - Comprehensive Metabolic Panel; Future  - CBC Auto Differential; Future  - Hepatitis B Surface Antigen; Future  - Urinalysis  - HCG, Quantitative; Future  - Protime-INR; Future  - APTT; Future    7. Need for hepatitis C screening test  Due. Ordered.   - Hepatitis C Antibody; Future    8. Hx of bilateral breast implants  GYN ordered mammogram and bilateral breast ultrasound.           Irma Burgos, MSN, APRN, FNP-C  [facility]  05/28/2024 9:28 AM        Follow up in about 2 weeks (around 6/11/2024) for Surgical clearance.

## 2024-05-29 ENCOUNTER — LAB VISIT (OUTPATIENT)
Dept: LAB | Facility: HOSPITAL | Age: 29
End: 2024-05-29
Payer: MEDICAID

## 2024-05-29 DIAGNOSIS — Z13.6 ENCOUNTER FOR SCREENING FOR CARDIOVASCULAR DISORDERS: ICD-10-CM

## 2024-05-29 DIAGNOSIS — Z13.29 SCREENING FOR THYROID DISORDER: ICD-10-CM

## 2024-05-29 DIAGNOSIS — Z11.59 NEED FOR HEPATITIS C SCREENING TEST: ICD-10-CM

## 2024-05-29 DIAGNOSIS — Z01.818 PREOPERATIVE CLEARANCE: ICD-10-CM

## 2024-05-29 DIAGNOSIS — Z11.4 ENCOUNTER FOR SCREENING FOR HUMAN IMMUNODEFICIENCY VIRUS (HIV): ICD-10-CM

## 2024-05-29 DIAGNOSIS — Z13.1 ENCOUNTER FOR SCREENING FOR DIABETES MELLITUS: ICD-10-CM

## 2024-05-29 LAB
ALBUMIN SERPL BCP-MCNC: 4 G/DL (ref 3.5–5.2)
ALP SERPL-CCNC: 35 U/L (ref 55–135)
ALT SERPL W/O P-5'-P-CCNC: 11 U/L (ref 10–44)
ANION GAP SERPL CALC-SCNC: 10 MMOL/L (ref 8–16)
APTT PPP: 31.7 SEC (ref 21–32)
AST SERPL-CCNC: 13 U/L (ref 10–40)
BASOPHILS # BLD AUTO: 0.03 K/UL (ref 0–0.2)
BASOPHILS NFR BLD: 0.4 % (ref 0–1.9)
BILIRUB SERPL-MCNC: 0.5 MG/DL (ref 0.1–1)
BUN SERPL-MCNC: 11 MG/DL (ref 6–20)
CALCIUM SERPL-MCNC: 9.6 MG/DL (ref 8.7–10.5)
CHLORIDE SERPL-SCNC: 107 MMOL/L (ref 95–110)
CHOLEST SERPL-MCNC: 167 MG/DL (ref 120–199)
CHOLEST/HDLC SERPL: 3.8 {RATIO} (ref 2–5)
CO2 SERPL-SCNC: 22 MMOL/L (ref 23–29)
CREAT SERPL-MCNC: 0.7 MG/DL (ref 0.5–1.4)
DIFFERENTIAL METHOD BLD: ABNORMAL
EOSINOPHIL # BLD AUTO: 0.1 K/UL (ref 0–0.5)
EOSINOPHIL NFR BLD: 0.8 % (ref 0–8)
ERYTHROCYTE [DISTWIDTH] IN BLOOD BY AUTOMATED COUNT: 12.6 % (ref 11.5–14.5)
EST. GFR  (NO RACE VARIABLE): >60 ML/MIN/1.73 M^2
ESTIMATED AVG GLUCOSE: 103 MG/DL (ref 68–131)
GLUCOSE SERPL-MCNC: 81 MG/DL (ref 70–110)
HBA1C MFR BLD: 5.2 % (ref 4–5.6)
HBV SURFACE AG SERPL QL IA: NORMAL
HCG INTACT+B SERPL-ACNC: <1.2 MIU/ML
HCT VFR BLD AUTO: 37.4 % (ref 37–48.5)
HCV AB SERPL QL IA: NORMAL
HDLC SERPL-MCNC: 44 MG/DL (ref 40–75)
HDLC SERPL: 26.3 % (ref 20–50)
HGB BLD-MCNC: 12.5 G/DL (ref 12–16)
HIV 1+2 AB+HIV1 P24 AG SERPL QL IA: NORMAL
IMM GRANULOCYTES # BLD AUTO: 0.02 K/UL (ref 0–0.04)
IMM GRANULOCYTES NFR BLD AUTO: 0.3 % (ref 0–0.5)
INR PPP: 1.1 (ref 0.8–1.2)
LDLC SERPL CALC-MCNC: 114 MG/DL (ref 63–159)
LYMPHOCYTES # BLD AUTO: 2.2 K/UL (ref 1–4.8)
LYMPHOCYTES NFR BLD: 27.2 % (ref 18–48)
MCH RBC QN AUTO: 32 PG (ref 27–31)
MCHC RBC AUTO-ENTMCNC: 33.4 G/DL (ref 32–36)
MCV RBC AUTO: 96 FL (ref 82–98)
MONOCYTES # BLD AUTO: 0.4 K/UL (ref 0.3–1)
MONOCYTES NFR BLD: 5.4 % (ref 4–15)
NEUTROPHILS # BLD AUTO: 5.3 K/UL (ref 1.8–7.7)
NEUTROPHILS NFR BLD: 65.9 % (ref 38–73)
NONHDLC SERPL-MCNC: 123 MG/DL
NRBC BLD-RTO: 0 /100 WBC
PLATELET # BLD AUTO: 269 K/UL (ref 150–450)
PMV BLD AUTO: 10.5 FL (ref 9.2–12.9)
POTASSIUM SERPL-SCNC: 4 MMOL/L (ref 3.5–5.1)
PROT SERPL-MCNC: 7.4 G/DL (ref 6–8.4)
PROTHROMBIN TIME: 11.9 SEC (ref 9–12.5)
RBC # BLD AUTO: 3.91 M/UL (ref 4–5.4)
SODIUM SERPL-SCNC: 139 MMOL/L (ref 136–145)
T3FREE SERPL-MCNC: 2.5 PG/ML (ref 2.3–4.2)
T4 FREE SERPL-MCNC: 1.04 NG/DL (ref 0.71–1.51)
TRIGL SERPL-MCNC: 45 MG/DL (ref 30–150)
TSH SERPL DL<=0.005 MIU/L-ACNC: 0.65 UIU/ML (ref 0.4–4)
WBC # BLD AUTO: 7.98 K/UL (ref 3.9–12.7)

## 2024-05-29 PROCEDURE — 83036 HEMOGLOBIN GLYCOSYLATED A1C: CPT

## 2024-05-29 PROCEDURE — 84439 ASSAY OF FREE THYROXINE: CPT

## 2024-05-29 PROCEDURE — 86803 HEPATITIS C AB TEST: CPT

## 2024-05-29 PROCEDURE — 84481 FREE ASSAY (FT-3): CPT

## 2024-05-29 PROCEDURE — 87389 HIV-1 AG W/HIV-1&-2 AB AG IA: CPT

## 2024-05-29 PROCEDURE — 84702 CHORIONIC GONADOTROPIN TEST: CPT

## 2024-05-29 PROCEDURE — 85025 COMPLETE CBC W/AUTO DIFF WBC: CPT

## 2024-05-29 PROCEDURE — 84443 ASSAY THYROID STIM HORMONE: CPT

## 2024-05-29 PROCEDURE — 80061 LIPID PANEL: CPT

## 2024-05-29 PROCEDURE — 87340 HEPATITIS B SURFACE AG IA: CPT

## 2024-05-29 PROCEDURE — 80053 COMPREHEN METABOLIC PANEL: CPT

## 2024-05-29 PROCEDURE — 36415 COLL VENOUS BLD VENIPUNCTURE: CPT

## 2024-05-29 PROCEDURE — 85730 THROMBOPLASTIN TIME PARTIAL: CPT

## 2024-05-29 PROCEDURE — 85610 PROTHROMBIN TIME: CPT

## 2024-05-30 ENCOUNTER — TELEPHONE (OUTPATIENT)
Dept: CARDIOLOGY | Facility: CLINIC | Age: 29
End: 2024-05-30
Payer: MEDICAID

## 2024-05-30 DIAGNOSIS — R79.89 ABNORMAL CBC: Primary | ICD-10-CM

## 2024-05-30 DIAGNOSIS — R94.31 ABNORMAL EKG: Primary | ICD-10-CM

## 2024-05-30 DIAGNOSIS — R74.8 LOW SERUM ALKALINE PHOSPHATASE: ICD-10-CM

## 2024-05-30 NOTE — TELEPHONE ENCOUNTER
Left Voice Mail for patient to call back at their conveniece.    Thanks,  Amie BANEGAS MA  [facility]    Scheduled appt for 6/17/2024 @    Pm      ----- Message from Aretha Hartman sent at 5/30/2024  4:32 PM CDT -----  Type:  Needs Medical Advice    Who Called: pt  Symptoms (please be specific): pt has referral for Abnormal EKG [ and needs to get in right away pt has surgery on  June 20 please call and schedule before   Would the patient rather a call back or a response via [facility]? Call  Best Call Back Number: [phone]  Additional Information:

## 2024-05-31 ENCOUNTER — PATIENT MESSAGE (OUTPATIENT)
Dept: PRIMARY CARE CLINIC | Facility: CLINIC | Age: 29
End: 2024-05-31
Payer: MEDICAID

## 2024-06-03 ENCOUNTER — LAB VISIT (OUTPATIENT)
Dept: LAB | Facility: HOSPITAL | Age: 29
End: 2024-06-03
Payer: MEDICAID

## 2024-06-03 DIAGNOSIS — R79.89 ABNORMAL CBC: ICD-10-CM

## 2024-06-03 DIAGNOSIS — R74.8 LOW SERUM ALKALINE PHOSPHATASE: ICD-10-CM

## 2024-06-03 LAB
FERRITIN SERPL-MCNC: 63 NG/ML (ref 20–300)
FOLATE SERPL-MCNC: 11.8 NG/ML (ref 4–24)
IRON SERPL-MCNC: 154 UG/DL (ref 30–160)
MAGNESIUM SERPL-MCNC: 2.1 MG/DL (ref 1.6–2.6)
SATURATED IRON: 42 % (ref 20–50)
TOTAL IRON BINDING CAPACITY: 369 UG/DL (ref 250–450)
TRANSFERRIN SERPL-MCNC: 249 MG/DL (ref 200–375)
VIT B12 SERPL-MCNC: 646 PG/ML (ref 210–950)

## 2024-06-03 PROCEDURE — 82652 VIT D 1 25-DIHYDROXY: CPT

## 2024-06-03 PROCEDURE — 82607 VITAMIN B-12: CPT

## 2024-06-03 PROCEDURE — 83540 ASSAY OF IRON: CPT

## 2024-06-03 PROCEDURE — 82728 ASSAY OF FERRITIN: CPT

## 2024-06-03 PROCEDURE — 36415 COLL VENOUS BLD VENIPUNCTURE: CPT

## 2024-06-03 PROCEDURE — 82746 ASSAY OF FOLIC ACID SERUM: CPT

## 2024-06-03 PROCEDURE — 83735 ASSAY OF MAGNESIUM: CPT

## 2024-06-03 PROCEDURE — 84630 ASSAY OF ZINC: CPT

## 2024-06-03 NOTE — TELEPHONE ENCOUNTER
Spoke with patient regarding recent lab results. Counseled patient on the importance of being cleared by cardiology prior to scheduled surgery on 06/20/24, patient verbalized understanding.

## 2024-06-06 ENCOUNTER — HOSPITAL ENCOUNTER (OUTPATIENT)
Dept: RADIOLOGY | Facility: OTHER | Age: 29
Discharge: HOME | End: 2024-06-06
Attending: NURSE PRACTITIONER
Payer: MEDICAID

## 2024-06-06 DIAGNOSIS — Z12.31 ENCOUNTER FOR SCREENING MAMMOGRAM FOR BREAST CANCER: ICD-10-CM

## 2024-06-06 DIAGNOSIS — Z98.82 BREAST IMPLANT STATUS: ICD-10-CM

## 2024-06-06 LAB
1,25(OH)2D3 SERPL-MCNC: 41 PG/ML (ref 20–79)
ZINC SERPL-MCNC: 87 UG/DL (ref 60–130)

## 2024-06-06 PROCEDURE — 77067 SCR MAMMO BI INCL CAD: CPT | Mod: TC

## 2024-06-06 PROCEDURE — 77067 SCR MAMMO BI INCL CAD: CPT | Mod: 26,,, | Performed by: RADIOLOGY

## 2024-06-06 PROCEDURE — 77063 BREAST TOMOSYNTHESIS BI: CPT | Mod: 26,,, | Performed by: RADIOLOGY

## 2024-06-10 ENCOUNTER — OFFICE VISIT (OUTPATIENT)
Dept: CARDIOLOGY | Facility: CLINIC | Age: 29
End: 2024-06-10
Payer: MEDICAID

## 2024-06-10 VITALS
HEART RATE: 70 BPM | DIASTOLIC BLOOD PRESSURE: 74 MMHG | SYSTOLIC BLOOD PRESSURE: 110 MMHG | BODY MASS INDEX: 26.32 KG/M2 | OXYGEN SATURATION: 99 % | WEIGHT: 143.88 LBS

## 2024-06-10 DIAGNOSIS — Z01.810 PREOPERATIVE CARDIOVASCULAR EXAMINATION: Primary | ICD-10-CM

## 2024-06-10 DIAGNOSIS — R94.31 ABNORMAL EKG: ICD-10-CM

## 2024-06-10 PROCEDURE — 1159F MED LIST DOCD IN RCRD: CPT | Mod: CPTII,,, | Performed by: INTERNAL MEDICINE

## 2024-06-10 PROCEDURE — 99203 OFFICE O/P NEW LOW 30 MIN: CPT | Mod: S$PBB,,, | Performed by: INTERNAL MEDICINE

## 2024-06-10 PROCEDURE — 3078F DIAST BP <80 MM HG: CPT | Mod: CPTII,,, | Performed by: INTERNAL MEDICINE

## 2024-06-10 PROCEDURE — 99999 PR PBB SHADOW E&M-EST. PATIENT-LVL III: CPT | Mod: PBBFAC,,, | Performed by: INTERNAL MEDICINE

## 2024-06-10 PROCEDURE — 3044F HG A1C LEVEL LT 7.0%: CPT | Mod: CPTII,,, | Performed by: INTERNAL MEDICINE

## 2024-06-10 PROCEDURE — 1160F RVW MEDS BY RX/DR IN RCRD: CPT | Mod: CPTII,,, | Performed by: INTERNAL MEDICINE

## 2024-06-10 PROCEDURE — 99213 OFFICE O/P EST LOW 20 MIN: CPT | Mod: PBBFAC | Performed by: INTERNAL MEDICINE

## 2024-06-10 PROCEDURE — 3008F BODY MASS INDEX DOCD: CPT | Mod: CPTII,,, | Performed by: INTERNAL MEDICINE

## 2024-06-10 PROCEDURE — 3074F SYST BP LT 130 MM HG: CPT | Mod: CPTII,,, | Performed by: INTERNAL MEDICINE

## 2024-06-10 NOTE — PROGRESS NOTES
[facility]    Chief Complaint  Chief Complaint   Patient presents with    Abnormal ECG       HPI:    Patient is referred for evaluation of abnormal electrocardiogram.  Electrocardiogram was performed for preoperative evaluation.  The patient is to have a breast implant revision.  No prior cardiac problems or workup.  She is active and exercises regularly without exertional dyspnea or chest discomfort.  Can well exceed 4 Mets without symptoms.    Medications  Current Outpatient Medications   Medication Sig Dispense Refill    BOTOX 100 unit SolR PROVIDER WILL ADMINISTER 50 UNITS PER AXILLA EVERY 12 WEEKS IN OFFICE      levonorgestreL (MIRENA) 21 mcg/24 hours (8 yrs) 52 mg IUD Mirena (52 MG)       No current facility-administered medications for this visit.        History  Past Medical History:   Diagnosis Date    Urinary tract infection     Vaginal infection      Past Surgical History:   Procedure Laterality Date    breast augmentation      teeth removal       Social History     Socioeconomic History    Marital status:    Tobacco Use    Smoking status: Never    Smokeless tobacco: Never   Substance and Sexual Activity    Alcohol use: No    Drug use: No    Sexual activity: Yes     Partners: Male     Birth control/protection: None, I.U.D.     Family History   Problem Relation Name Age of Onset    Kidney disease Neg Hx      Breast cancer Neg Hx      Colon cancer Neg Hx      Ovarian cancer Neg Hx          Allergies  Review of patient's allergies indicates:  No Known Allergies    Review of Systems   Review of Systems   Constitutional: Negative for malaise/fatigue, weight gain and weight loss.   Eyes:  Negative for visual disturbance.   Cardiovascular:  Negative for chest pain, claudication, cyanosis, dyspnea on exertion, irregular heartbeat, leg swelling, near-syncope, orthopnea, palpitations, paroxysmal nocturnal dyspnea and syncope.   Respiratory:  Negative for cough, hemoptysis, shortness of  breath, sleep disturbances due to breathing and wheezing.    Hematologic/Lymphatic: Negative for bleeding problem. Does not bruise/bleed easily.   Skin:  Negative for poor wound healing.   Musculoskeletal:  Negative for muscle cramps and myalgias.   Gastrointestinal:  Negative for abdominal pain, anorexia, diarrhea, heartburn, hematemesis, hematochezia, melena, nausea and vomiting.   Genitourinary:  Negative for hematuria and nocturia.   Neurological:  Negative for excessive daytime sleepiness, dizziness, focal weakness, light-headedness and weakness.       Physical Exam  Vitals:    06/10/24 1001   BP: 110/74   Pulse: 70     Wt Readings from Last 1 Encounters:   06/10/24 65.3 kg (143 lb 14.4 oz)     Physical Exam  Vitals and nursing note reviewed.   Constitutional:       General: She is not in acute distress.     Appearance: She is not toxic-appearing or diaphoretic.   HENT:      Head: Normocephalic and atraumatic.      Mouth/Throat:      Lips: Pink.      Mouth: Mucous membranes are moist.   Eyes:      General: No scleral icterus.     Conjunctiva/sclera: Conjunctivae normal.   Neck:      Thyroid: No thyromegaly.      Vascular: No carotid bruit, hepatojugular reflux or JVD.      Trachea: Trachea normal.   Cardiovascular:      Rate and Rhythm: Normal rate and regular rhythm. No extrasystoles are present.     Chest Wall: PMI is not displaced.      Pulses:           Carotid pulses are 2+ on the right side and 2+ on the left side.       Radial pulses are 2+ on the right side and 2+ on the left side.        Dorsalis pedis pulses are 2+ on the right side and 2+ on the left side.        Posterior tibial pulses are 2+ on the right side and 2+ on the left side.      Heart sounds: S1 normal and S2 normal. No murmur heard.     No friction rub. No S3 or S4 sounds.   Pulmonary:      Effort: Pulmonary effort is normal. No accessory muscle usage or respiratory distress.      Breath sounds: Normal breath sounds and air entry. No  decreased breath sounds, wheezing, rhonchi or rales.   Abdominal:      General: Bowel sounds are normal. There is no distension or abdominal bruit.      Palpations: Abdomen is soft. There is no hepatomegaly, splenomegaly or pulsatile mass.      Tenderness: There is no abdominal tenderness.   Musculoskeletal:         General: No tenderness or deformity.      Right lower leg: No edema.      Left lower leg: No edema.   Skin:     General: Skin is warm and dry.      Capillary Refill: Capillary refill takes less than 2 seconds.      Coloration: Skin is not cyanotic or pale.      Nails: There is no clubbing.   Neurological:      General: No focal deficit present.      Mental Status: She is alert and oriented to person, place, and time.   Psychiatric:         Attention and Perception: Attention normal.         Mood and Affect: Mood normal.         Speech: Speech normal.         Behavior: Behavior normal. Behavior is cooperative.         Labs  Lab Visit on 06/03/2024   Component Date Value Ref Range Status    Iron 06/03/2024 154  30 - 160 ug/dL Final    Transferrin 06/03/2024 249  200 - 375 mg/dL Final    TIBC 06/03/2024 369  250 - 450 ug/dL Final    Saturated Iron 06/03/2024 42  20 - 50 % Final    Ferritin 06/03/2024 63  20.0 - 300.0 ng/mL Final    Vitamin B-12 06/03/2024 646  210 - 950 pg/mL Final    Folate 06/03/2024 11.8  4.0 - 24.0 ng/mL Final    Magnesium 06/03/2024 2.1  1.6 - 2.6 mg/dL Final    Zinc 06/03/2024 87  60 - 130 ug/dL Final    Comment: Elevated results may be due to sample collected in a   non-certified trace element-free tube.     This test was developed and the performance   characteristics determined by [facility].   It has not been cleared or approved by the FDA.   The laboratory is regulated under CLIA as qualified to   perform high-complexity testing. This test is used for   patient testing purposes. It should not be regarded   as investigational or for research.    Test performed at  [facility],  [address] , [place]  [address]     [phone]  Savannah Daigle MD, PhD - Medical Director      Vit D, 1,25-Dihydroxy 06/03/2024 41  20 - 79 pg/mL Final    Comment: Vitamin D 1, 25 dihydroxy levels should be primarily used to  assess Vitamin D status in patients with renal disease and   hypercalcemia. Vitamin D 1,25-dihydroxy levels are generally  less than 5 pg/mL in end stage renal disease patients. The  preferred initial test for assessing Vitamin D status in the  general population is Vitamin D 25-hydroxy (VITD).    Test performed at [facility],  [address] , [place]  [address]     [phone]  Savannah Daigle MD, PhD - Medical Director     Clinical Support on 05/29/2024   Component Date Value Ref Range Status    QRS Duration 05/29/2024 100  ms Final    [facility] QTC Calculation 05/29/2024 395  ms Final   Lab Visit on 05/29/2024   Component Date Value Ref Range Status    Hemoglobin A1C 05/29/2024 5.2  4.0 - 5.6 % Final    Comment: ADA Screening Guidelines:  5.7-6.4%  Consistent with prediabetes  >or=6.5%  Consistent with diabetes    High levels of fetal hemoglobin interfere with the HbA1C  assay. Heterozygous hemoglobin variants (HbS, HgC, etc)do  not significantly interfere with this assay.   However, presence of multiple variants may affect accuracy.      Estimated Avg Glucose 05/29/2024 103  68 - 131 mg/dL Final    Cholesterol 05/29/2024 167  120 - 199 mg/dL Final    Comment: The National Cholesterol Education Program (NCEP) has set the  following guidelines (reference ranges) for Cholesterol:  Optimal.....................<200 mg/dL  Borderline High.............200-239 mg/dL  High........................> or = 240 mg/dL      Triglycerides 05/29/2024 45  30 - 150 mg/dL Final    Comment: The National Cholesterol Education Program (NCEP) has set the  following guidelines (reference values) for triglycerides:  Normal......................<150  mg/dL  Borderline High.............150-199 mg/dL  High........................200-499 mg/dL      HDL 05/29/2024 44  40 - 75 mg/dL Final    Comment: The National Cholesterol Education Program (NCEP) has set the  following guidelines (reference values) for HDL Cholesterol:  Low...............<40 mg/dL  Optimal...........>60 mg/dL      LDL Cholesterol 05/29/2024 114.0  63.0 - 159.0 mg/dL Final    Comment: The National Cholesterol Education Program (NCEP) has set the  following guidelines (reference values) for LDL Cholesterol:  Optimal.......................<130 mg/dL  Borderline High...............130-159 mg/dL  High..........................160-189 mg/dL  Very High.....................>190 mg/dL      HDL/Cholesterol Ratio 05/29/2024 26.3  20.0 - 50.0 % Final    Total Cholesterol/HDL Ratio 05/29/2024 3.8  2.0 - 5.0 Final    Non-HDL Cholesterol 05/29/2024 123  mg/dL Final    Comment: Risk category and Non-HDL cholesterol goals:  Coronary heart disease (CHD)or equivalent (10-year risk of CHD >20%):  Non-HDL cholesterol goal     <130 mg/dL  Two or more CHD risk factors and 10-year risk of CHD <= 20%:  Non-HDL cholesterol goal     <160 mg/dL  0 to 1 CHD risk factor:  Non-HDL cholesterol goal     <190 mg/dL      TSH 05/29/2024 0.645  0.400 - 4.000 uIU/mL Final    Sodium 05/29/2024 139  136 - 145 mmol/L Final    Potassium 05/29/2024 4.0  3.5 - 5.1 mmol/L Final    Chloride 05/29/2024 107  95 - 110 mmol/L Final    CO2 05/29/2024 22 (L)  23 - 29 mmol/L Final    Glucose 05/29/2024 81  70 - 110 mg/dL Final    BUN 05/29/2024 11  6 - 20 mg/dL Final    Creatinine 05/29/2024 0.7  0.5 - 1.4 mg/dL Final    Calcium 05/29/2024 9.6  8.7 - 10.5 mg/dL Final    Total Protein 05/29/2024 7.4  6.0 - 8.4 g/dL Final    Albumin 05/29/2024 4.0  3.5 - 5.2 g/dL Final    Total Bilirubin 05/29/2024 0.5  0.1 - 1.0 mg/dL Final    Comment: For infants and newborns, interpretation of results should be based  on gestational age, weight and in agreement  with clinical  observations.    Premature Infant recommended reference ranges:  Up to 24 hours.............<8.0 mg/dL  Up to 48 hours............<12.0 mg/dL  3-5 days..................<15.0 mg/dL  6-29 days.................<15.0 mg/dL      Alkaline Phosphatase 05/29/2024 35 (L)  55 - 135 U/L Final    AST 05/29/2024 13  10 - 40 U/L Final    ALT 05/29/2024 11  10 - 44 U/L Final    eGFR 05/29/2024 >60  >60 mL/min/1.73 m^2 Final    Anion Gap 05/29/2024 10  8 - 16 mmol/L Final    WBC 05/29/2024 7.98  3.90 - 12.70 K/uL Final    RBC 05/29/2024 3.91 (L)  4.00 - 5.40 M/uL Final    Hemoglobin 05/29/2024 12.5  12.0 - 16.0 g/dL Final    Hematocrit 05/29/2024 37.4  37.0 - 48.5 % Final    MCV 05/29/2024 96  82 - 98 fL Final    MCH 05/29/2024 32.0 (H)  27.0 - 31.0 pg Final    MCHC 05/29/2024 33.4  32.0 - 36.0 g/dL Final    RDW 05/29/2024 12.6  11.5 - 14.5 % Final    Platelets 05/29/2024 269  150 - 450 K/uL Final    MPV 05/29/2024 10.5  9.2 - 12.9 fL Final    Immature Granulocytes 05/29/2024 0.3  0.0 - 0.5 % Final    Gran # (ANC) 05/29/2024 5.3  1.8 - 7.7 K/uL Final    Immature Grans (Abs) 05/29/2024 0.02  0.00 - 0.04 K/uL Final    Comment: Mild elevation in immature granulocytes is non specific and   can be seen in a variety of conditions including stress response,   acute inflammation, trauma and pregnancy. Correlation with other   laboratory and clinical findings is essential.      Lymph # 05/29/2024 2.2  1.0 - 4.8 K/uL Final    Mono # 05/29/2024 0.4  0.3 - 1.0 K/uL Final    Eos # 05/29/2024 0.1  0.0 - 0.5 K/uL Final    Baso # 05/29/2024 0.03  0.00 - 0.20 K/uL Final    nRBC 05/29/2024 0  0 /100 WBC Final    Gran % 05/29/2024 65.9  38.0 - 73.0 % Final    Lymph % 05/29/2024 27.2  18.0 - 48.0 % Final    Mono % 05/29/2024 5.4  4.0 - 15.0 % Final    Eosinophil % 05/29/2024 0.8  0.0 - 8.0 % Final    Basophil % 05/29/2024 0.4  0.0 - 1.9 % Final    Differential Method 05/29/2024 Automated   Final    HIV 1/2 Ag/Ab 05/29/2024 Non-reactive   Non-reactive Final    Hepatitis B Surface Ag 05/29/2024 Non-reactive  Non-reactive Final    Hepatitis C Ab 05/29/2024 Non-reactive  Non-reactive Final    HCG Quant 05/29/2024 <1.2  See Text mIU/mL Final    Comment: Quantitative HCG Reference Ranges:  Males........................<5.0 mIU/mL  Non-Pregnant Females.........<5.0 mIU/mL  Pregnancy:  Weeks post-LMP...............Range (mIU/mL)  1-10  weeks.....................202-231,000  11-15 weeks..................22,536-234,990  16-22 weeks...................8,007-50,064  23-40 weeks...................1,600-49,413    NOTE:  This assay is not FDA approved for tumor screening,   diagnosis, or monitoring.      T3, Free 05/29/2024 2.5  2.3 - 4.2 pg/mL Final    Free T4 05/29/2024 1.04  0.71 - 1.51 ng/dL Final    Prothrombin Time 05/29/2024 11.9  9.0 - 12.5 sec Final    INR 05/29/2024 1.1  0.8 - 1.2 Final    Comment: Coumadin Therapy:  2.0 - 3.0 for INR for all indicators except mechanical heart valves  and antiphospholipid syndromes which should use 2.5 - 3.5.  LOT^040^PT Inn^[id number]      aPTT 05/29/2024 31.7  21.0 - 32.0 sec Final    Comment: Refer to local heparin nomogram for intensity/dose specific   therapeutic   range.  LOT^050^APTT FSL^[id number]     Office Visit on 03/28/2024   Component Date Value Ref Range Status    Trichomonas vaginalis 03/28/2024 Negative  Negative Final    Candida sp 03/28/2024 Negative  Negative Final    Comment: Irma species group includes: Candida albicans, Candida tropicalis,  Candida parapsiolosis, Irma dubliniensis      Irma glabrata DNA 03/28/2024 Negative  Negative Final    Irma krusei DNA 03/28/2024 Negative  Negative Final    Bacterial vaginosis DNA 03/28/2024 Positive (A)  Negative Final   Office Visit on 01/15/2024   Component Date Value Ref Range Status    POC Molecular Influenza A Ag 01/15/2024 Negative  Negative, Not Reported Final    POC Molecular Influenza B Ag 01/15/2024 Positive (A)  Negative, Not Reported Final      Acceptable 01/15/2024 Yes   Final       Imaging  No results found.    Assessment  1. Abnormal EKG  Right bundle delay requires no further investigations.  It is unchanged from prior electrocardiograms.  - Ambulatory referral/consult to Cardiology    2. Preoperative cardiovascular examination  She is in acceptable candidate for the planned procedure and is considered to be at low risk for perioperative cardiovascular morbidity and mortality.      The ASCVD Risk score (Kaden DK, et al., 2019) failed to calculate for the following reasons:    The 2019 ASCVD risk score is only valid for ages 40 to 79     Follow Up  Follow up if symptoms worsen or fail to improve.      Jean Paul Oliva MD

## 2024-06-13 ENCOUNTER — OFFICE VISIT (OUTPATIENT)
Dept: PRIMARY CARE CLINIC | Facility: CLINIC | Age: 29
End: 2024-06-13
Payer: MEDICAID

## 2024-06-13 VITALS
TEMPERATURE: 99 F | BODY MASS INDEX: 26.31 KG/M2 | WEIGHT: 142.94 LBS | HEART RATE: 57 BPM | SYSTOLIC BLOOD PRESSURE: 111 MMHG | HEIGHT: 62 IN | DIASTOLIC BLOOD PRESSURE: 72 MMHG | OXYGEN SATURATION: 100 %

## 2024-06-13 DIAGNOSIS — Z01.818 PREOPERATIVE CLEARANCE: Primary | ICD-10-CM

## 2024-06-13 PROCEDURE — 3078F DIAST BP <80 MM HG: CPT | Mod: CPTII,,,

## 2024-06-13 PROCEDURE — 99213 OFFICE O/P EST LOW 20 MIN: CPT | Mod: PBBFAC,PN

## 2024-06-13 PROCEDURE — 3074F SYST BP LT 130 MM HG: CPT | Mod: CPTII,,,

## 2024-06-13 PROCEDURE — 3008F BODY MASS INDEX DOCD: CPT | Mod: CPTII,,,

## 2024-06-13 PROCEDURE — 99999 PR PBB SHADOW E&M-EST. PATIENT-LVL III: CPT | Mod: PBBFAC,,,

## 2024-06-13 PROCEDURE — 3044F HG A1C LEVEL LT 7.0%: CPT | Mod: CPTII,,,

## 2024-06-13 PROCEDURE — 1159F MED LIST DOCD IN RCRD: CPT | Mod: CPTII,,,

## 2024-06-13 PROCEDURE — 99214 OFFICE O/P EST MOD 30 MIN: CPT | Mod: S$PBB,,,

## 2024-06-13 NOTE — PROGRESS NOTES
Preop Note      SUBJECTIVE:     Adeel Kern is a 29 y.o. female who presents to the office today for a preoperative consultation at the request of surgeon Dr. Eric Porter who plans on performing mini abdominoplasty, breast implant exchange, breast lift, liposuction of abdomen and flanks on June 20, 2024. This consultation is requested for the specific conditions prompting preoperative evaluation (i.e. because of potential affect on operative risk):     CARDIAC: Patient cleared by cardiology    PULMONARY: Breath sounds normal. No abnormalities noted.    NUTRITION:  Appetite normal, no difficulty swallowing.     Can pt achieve 4 mets (climb flight of stairs, gardening, walking fast, biking)? Yes    Duke Activity Status Index (DASI): 58.2 points 9.89 METS score     Review of Systems:  Review of Systems   Constitutional:  Negative for chills, fever and weight loss.   HENT:  Negative for ear discharge, ear pain and tinnitus.    Eyes:  Negative for blurred vision and double vision.   Respiratory:  Negative for cough and shortness of breath.    Cardiovascular:  Negative for chest pain, palpitations, orthopnea and leg swelling.   Gastrointestinal:  Negative for constipation and diarrhea.   Genitourinary:  Negative for dysuria and hematuria.   Musculoskeletal:  Negative for back pain and myalgias.   Skin:  Negative for itching and rash.   Neurological:  Negative for dizziness and headaches.   Endo/Heme/Allergies:  Does not bruise/bleed easily.   Psychiatric/Behavioral:  Negative for depression. The patient does not have insomnia.          Review of patient's allergies indicates:  No Known Allergies  Current Outpatient Medications on File Prior to Visit   Medication Sig Dispense Refill    BOTOX 100 unit SolR PROVIDER WILL ADMINISTER 50 UNITS PER AXILLA EVERY 12 WEEKS IN OFFICE      levonorgestreL (MIRENA) 21 mcg/24 hours (8 yrs) 52 mg IUD Mirena (52 MG)       No current facility-administered medications on file prior to  visit.     Past Medical History:   Diagnosis Date    Urinary tract infection     Vaginal infection      Past Surgical History:   Procedure Laterality Date    breast augmentation      teeth removal       Family History   Problem Relation Name Age of Onset    Kidney disease Neg Hx      Breast cancer Neg Hx      Colon cancer Neg Hx      Ovarian cancer Neg Hx        Social History     Socioeconomic History    Marital status:    Tobacco Use    Smoking status: Never    Smokeless tobacco: Never   Substance and Sexual Activity    Alcohol use: No    Drug use: No    Sexual activity: Yes     Partners: Male     Birth control/protection: None, I.U.D.         OBJECTIVE:     Vital Signs (Most Recent)  Temp: 98.7 °F (37.1 °C) (06/13/24 1038)  Pulse: (!) 57 (06/13/24 1038)  BP: 111/72 (06/13/24 1038)  SpO2: 100 % (06/13/24 1038)      Physical Exam:  Physical Exam  Vitals reviewed.   Constitutional:       General: She is not in acute distress.     Appearance: Normal appearance.   HENT:      Head: Normocephalic.      Right Ear: External ear normal.      Left Ear: External ear normal.      Nose: Nose normal.      Mouth/Throat:      Mouth: Mucous membranes are moist.      Pharynx: Oropharynx is clear.   Eyes:      Extraocular Movements: Extraocular movements intact.      Conjunctiva/sclera: Conjunctivae normal.      Pupils: Pupils are equal, round, and reactive to light.   Cardiovascular:      Rate and Rhythm: Normal rate and regular rhythm.      Pulses: Normal pulses.      Heart sounds: Normal heart sounds.   Pulmonary:      Effort: Pulmonary effort is normal. No respiratory distress.      Breath sounds: Normal breath sounds.   Chest:      Chest wall: No tenderness.   Abdominal:      General: Abdomen is flat. Bowel sounds are normal. There is no distension.      Palpations: Abdomen is soft.   Musculoskeletal:         General: No swelling. Normal range of motion.      Cervical back: Normal range of motion.   Skin:     General:  Skin is warm and dry.      Capillary Refill: Capillary refill takes less than 2 seconds.      Findings: No rash.   Neurological:      General: No focal deficit present.      Mental Status: She is alert and oriented to person, place, and time.   Psychiatric:         Mood and Affect: Mood normal.         Behavior: Behavior normal.         Thought Content: Thought content normal.         Judgment: Judgment normal.         Diagnostic Results:  Labs: Reviewed  ECG: Reviewed  Mammogram results reviewed.     ASSESSMENT/PLAN:     Preoperative clearance      Cohen Score      HISTORY  Age >70 years (5 points)  Myocardial infarction within 6 months (10 points).    CARDIAC EXAM  Signs of CHF: ventricular gallop or JVD (11 points)  Significant aortic stenosis (3 points)    EKG  Arrhythmia other than sinus or premature atrial contractions (7 points) -cleared by cardiology  5 or more PVCs per minute (7 points)    GENERAL MED CONDITIONS  PO2 <60 mmHg; PCO2 >50 mmHg; K <3 mEq/L; HCO3 <20 mEq/L; BUN >50 mg/dL; Creatinine >3 mg/dL; elevated SGOT; chronic liver disease; bedridden (3 points)    OPERATION  Emergency (4 points)  Intraperitoneal, intrathoracic or aortic (3 points)      0 to 5 Points: Class I 1% Complications   6 to 12 Points: Class II 7% Complications   13 to 25 Points: Class III 14% Complications   26 to 53 Points: Class IV 78% Complications     Cohen Score: 7      Surgery-Related Predictors for Risk of Perioperative Cardiac Complications  High risk   Emergency surgery   Anticipated increased blood loss   Aortic or peripheral vascular surgery     Intermediate risk   Abdominal or thoracic surgery   Head and neck surgery   Carotid endarterectomy   Orthopedic surgery   Prostate surgery     Low risk   Breast surgery   Cataract surgery   Superficial surgery   Endoscopy         Summary of Recommended Preoperative Laboratory Tests Depending on the History and Physical Findings  Condition Indicated testing and other measures*    Healthy patient    ? 40 years Hemoglobin, urine screening for pregnancy in women of childbearing potential    > 40 years Add ECG and blood glucose (age ? 45 years)   Cardiovascular disease ECG, chest radiographs, hemoglobin, electrolytes, BUN, creatinine, glucose (age ? 45 years or history of diabetes)    Recent MI (?6 weeks), unstable angina, decompensated CHF, significant arrhythmias, severe valvular disease Cardiology consultation    Previous MI (> 6 weeks ago), mild stable angina, compensated CHF, diabetes mellitus Stress test if high-risk procedure or patient has low functional capacity; consider assessment of left ventricular function (i.e., echocardiography)    Rhythm other than normal sinus rhythm, abnormal ECG, history of stroke, advanced age, low functional capacity Stress test if high-risk procedure and patient has low functional capacity   Pulmonary disease Chest radiographs, hemoglobin, glucose (age ? 45 years), ECG (age > 40 years); provide patient with instructions for incentive spirometry or deep-breathing exercises    Asthma Pulmonary function testing or peak flow rate to assess disease status    COPD Consider pulmonary function testing and arterial blood gas analysis for assessment of disease severity    Cough Evaluate for etiology    Dyspnea Evaluate for etiology    Smoking  patient to stop smoking 4 to 8 weeks before surgery   Obesity Provide patient with instructions for incentive spirometry or deep-breathing exercises   Abdominal or thoracic surgery Provide patient with instructions for incentive spirometry or deep-breathing exercises   Malnutrition Laboratory tests based on primary disease, plus albumin and lymphocyte count; if malnutrition is severe, consider postponing surgery and providing preoperative supplementation          Adeel Kern is a 29 y.o. female who  has a past medical history of Urinary tract infection and Vaginal infection.   who presented for pre op evaluation.  The encounter diagnosis was Preoperative clearance.    As with all procedures, there is inherent risk of multiple complications including those related to bleeding, infectious, cardiac, and pulmonary.    No chest pain or shortness of breath at rest or with exertion.  Normal exercise tolerance for age and can achieve 4 METS without chest pain or SOB.  Blood pressure is controlled and vital signs are within normal limits.  Patient is in acceptable candidate for the planned procedure and is considered to be at low risk for perioperative cardiovascular morbidity and mortality. .      Please stop Aspirin and NSAIDS one week prior to surgery.    Predisposing risk factors for pulmonary complications include cough, dyspnea, smoking, a history of lung disease, obesity and abdominal or thoracic surgery.    Any pts > 70 years old may be at risk for delirium or cardiac complications.  Furthermore, diabetic patients may be at risk for infection or prolonged healing.      Preop Evaluation  Patient is in acceptable candidate for the planned procedure and is considered to be at low risk for perioperative cardiovascular morbidity and mortality. Patient is cleared medically for scheduled surgery.     6/13/2024 ELIF ZHENG  10:53 AM    I spent a total of 35 minutes on the day of the visit.This includes face to face time and non-face to face time preparing to see the patient (eg, review of tests), obtaining and/or reviewing separately obtained history, documenting clinical information in the electronic or other health record, independently interpreting results and communicating results to the patient/family/caregiver, or care coordinator.

## 2024-06-13 NOTE — LETTER
June 13, 2024    Adeel Kern  [address]   [address]  [place] [address]             [facility]  [address]  [place] [address]   Medical Clearance      Attention Dr. Eric Porter,     Patient, Adeel Kern, has been cleared by medical and cardiology for scheduled surgery on June 20th, 2024.     If you have any questions or concerns, please don't hesitate to call.    Sincerely,       Irma Burgos, FNP

## 2024-08-12 ENCOUNTER — PATIENT OUTREACH (OUTPATIENT)
Dept: ADMINISTRATIVE | Facility: OTHER | Age: 29
End: 2024-08-12
Payer: MEDICAID

## 2024-08-12 NOTE — PROGRESS NOTES
CHW - Initial Contact    This Community Health Worker completed the Social Determinant of Health questionnaire with patient via telephone today.    Pt identified barriers of most importance are: No barriers reported.   Referrals to community agencies completed with patient/caregiver consent outside of [facility] include: No.  Referrals were put through [facility] - no:   Support and Services: No.  Other information discussed the patient needs / wants help with: SDOH completed. Patient did not report any barriers, case will be closed at this time.    Follow up required: No.

## 2024-08-13 ENCOUNTER — OFFICE VISIT (OUTPATIENT)
Dept: PRIMARY CARE CLINIC | Facility: CLINIC | Age: 29
End: 2024-08-13
Payer: MEDICAID

## 2024-08-13 VITALS
WEIGHT: 142.06 LBS | OXYGEN SATURATION: 99 % | TEMPERATURE: 98 F | HEART RATE: 77 BPM | SYSTOLIC BLOOD PRESSURE: 103 MMHG | DIASTOLIC BLOOD PRESSURE: 68 MMHG | HEIGHT: 62 IN | BODY MASS INDEX: 26.14 KG/M2

## 2024-08-13 DIAGNOSIS — Z71.2 ENCOUNTER TO DISCUSS TEST RESULTS: ICD-10-CM

## 2024-08-13 DIAGNOSIS — M25.531 PAIN IN BOTH WRISTS: Primary | ICD-10-CM

## 2024-08-13 DIAGNOSIS — M25.532 PAIN IN BOTH WRISTS: Primary | ICD-10-CM

## 2024-08-13 PROCEDURE — 1159F MED LIST DOCD IN RCRD: CPT | Mod: CPTII,,,

## 2024-08-13 PROCEDURE — 3074F SYST BP LT 130 MM HG: CPT | Mod: CPTII,,,

## 2024-08-13 PROCEDURE — 99999 PR PBB SHADOW E&M-EST. PATIENT-LVL IV: CPT | Mod: PBBFAC,,,

## 2024-08-13 PROCEDURE — 3078F DIAST BP <80 MM HG: CPT | Mod: CPTII,,,

## 2024-08-13 PROCEDURE — 99214 OFFICE O/P EST MOD 30 MIN: CPT | Mod: PBBFAC,PN

## 2024-08-13 PROCEDURE — 3008F BODY MASS INDEX DOCD: CPT | Mod: CPTII,,,

## 2024-08-13 PROCEDURE — 3044F HG A1C LEVEL LT 7.0%: CPT | Mod: CPTII,,,

## 2024-08-13 PROCEDURE — 99213 OFFICE O/P EST LOW 20 MIN: CPT | Mod: S$PBB,,,

## 2024-08-13 RX ORDER — CEPHALEXIN 500 MG/1
500 CAPSULE ORAL EVERY 6 HOURS
COMMUNITY
Start: 2024-06-20

## 2024-08-13 RX ORDER — ENOXAPARIN SODIUM 100 MG/ML
INJECTION SUBCUTANEOUS
COMMUNITY
Start: 2024-06-21

## 2024-08-13 RX ORDER — OXYCODONE AND ACETAMINOPHEN 5; 325 MG/1; MG/1
1 TABLET ORAL EVERY 6 HOURS PRN
COMMUNITY
Start: 2024-06-20

## 2024-08-13 NOTE — PROGRESS NOTES
SUBJECTIVE     Chief Complaint   Patient presents with    Follow-up     Follow up after labs     Wrist Pain       HPI  Adeel Kern is a 29 y.o. female with no significant past medical history that presents for follow-up discuss labs and wc/o bilateral wrist pain for over a year.     Follow-up  Pertinent negatives include no abdominal pain, chest pain, chills, congestion, coughing, fatigue, fever, headaches, joint swelling, nausea, rash, sore throat or vomiting.   Wrist Pain   The pain is present in the left wrist and right wrist. The current episode started more than 1 year ago. There has been no history of extremity trauma. The problem occurs every several days. The problem has been rapidly worsening. The quality of the pain is described as aching. Associated symptoms include joint swelling and a limited range of motion. Pertinent negatives include no fever. Exacerbated by: and doing floor exercises. She has tried nothing for the symptoms.        PAST MEDICAL HISTORY:  Past Medical History:   Diagnosis Date    Urinary tract infection     Vaginal infection        ALLERGIES AND MEDICATIONS: updated and reviewed.  Review of patient's allergies indicates:  No Known Allergies  Current Outpatient Medications   Medication Sig Dispense Refill    BOTOX 100 unit SolR PROVIDER WILL ADMINISTER 50 UNITS PER AXILLA EVERY 12 WEEKS IN OFFICE (Patient not taking: Reported on 8/13/2024)      cephALEXin (KEFLEX) 500 MG capsule Take 500 mg by mouth every 6 (six) hours. (Patient not taking: Reported on 8/13/2024)      enoxaparin (LOVENOX) 30 mg/0.3 mL Syrg SMARTSIG:Syringe(s) SUB-Q Daily (Patient not taking: Reported on 8/13/2024)      levonorgestreL (MIRENA) 21 mcg/24 hours (8 yrs) 52 mg IUD Mirena (52 MG) (Patient not taking: Reported on 8/13/2024)      oxyCODONE-acetaminophen (PERCOCET) 5-325 mg per tablet Take 1 tablet by mouth every 6 (six) hours as needed. (Patient not taking: Reported on 8/13/2024)       No current  "facility-administered medications for this visit.       ROS  Review of Systems   Constitutional:  Negative for activity change, chills, fatigue and fever.   HENT:  Negative for congestion, facial swelling, rhinorrhea and sore throat.    Respiratory:  Negative for cough, chest tightness and shortness of breath.    Cardiovascular:  Negative for chest pain and palpitations.   Gastrointestinal:  Negative for abdominal pain, constipation, diarrhea, nausea and vomiting.   Genitourinary:  Negative for dysuria.   Musculoskeletal:  Negative for back pain and joint swelling.   Skin:  Negative for rash and wound.   Neurological:  Negative for dizziness, speech difficulty, light-headedness and headaches.   Psychiatric/Behavioral:  Negative for behavioral problems, dysphoric mood and sleep disturbance. The patient is not nervous/anxious.        OBJECTIVE     Physical Exam  Vitals:    08/13/24 1357   BP: 103/68   Pulse: 77   Temp: 98.3 °F (36.8 °C)    Body mass index is 25.99 kg/m².  Weight: 64.4 kg (142 lb 1.4 oz)   Height: 5' 2" (157.5 cm)     Physical Exam  Vitals reviewed.   Constitutional:       General: She is not in acute distress.  HENT:      Right Ear: External ear normal.      Left Ear: External ear normal.      Nose: Nose normal.      Mouth/Throat:      Mouth: Mucous membranes are moist.   Eyes:      Extraocular Movements: Extraocular movements intact.      Conjunctiva/sclera: Conjunctivae normal.      Pupils: Pupils are equal, round, and reactive to light.   Cardiovascular:      Rate and Rhythm: Normal rate and regular rhythm.      Pulses: Normal pulses.      Heart sounds: Normal heart sounds.   Pulmonary:      Effort: Pulmonary effort is normal.      Breath sounds: Normal breath sounds.   Abdominal:      General: Bowel sounds are normal. There is no distension.      Palpations: Abdomen is soft.   Musculoskeletal:         General: No swelling.      Right wrist: Normal. No swelling or deformity.      Left wrist: " Normal. No swelling or deformity.      Cervical back: Normal range of motion.      Comments: Negative Tinel and Phalen's test   Skin:     General: Skin is warm and dry.      Findings: No rash.   Neurological:      General: No focal deficit present.      Mental Status: She is alert and oriented to person, place, and time.   Psychiatric:         Mood and Affect: Mood normal.         Behavior: Behavior normal.         Thought Content: Thought content normal.         Judgment: Judgment normal.       Health Maintenance         Date Due Completion Date    TETANUS VACCINE Never done ---    COVID-19 Vaccine (3 - 2023-24 season) 09/01/2023 5/12/2021    Influenza Vaccine (1) 09/01/2024 ---    Cervical Cancer Screening 07/21/2028 7/21/2023              ASSESSMENT     29 y.o. female with     1. Pain in both wrists    2. Encounter to discuss test results        PLAN:     1. Pain in both wrists  Ongoing. Assessing.   -patient advised to use OTC Tylenol (325mg tablets) once every 4-6 hours as needed for pain   - avoid regular use of NSAIDs due to potential GI/BP side effects (may take a short-course and then stop).   -All questions were answered in detail. The patient is in full agreement with the treatment plan and will proceed accordingly.   - X-Ray Wrist Complete Bilateral; Future  - Ambulatory referral/consult to Physical/Occupational Therapy; Future    2. Encounter to discuss test results  - Discussed recent lab results  - All questions/concerns addressed  - Pt voiced understanding       ELIF Merida  [facility]  08/13/2024 2:32 PM        Follow up in about 4 weeks (around 9/10/2024) for Virtual Visit.

## 2024-08-22 ENCOUNTER — CLINICAL SUPPORT (OUTPATIENT)
Dept: REHABILITATION | Facility: HOSPITAL | Age: 29
End: 2024-08-22
Payer: MEDICAID

## 2024-08-22 DIAGNOSIS — M25.531 PAIN IN BOTH WRISTS: ICD-10-CM

## 2024-08-22 DIAGNOSIS — M25.532 PAIN IN BOTH WRISTS: ICD-10-CM

## 2024-08-22 DIAGNOSIS — M25.532 PAIN IN LEFT WRIST: ICD-10-CM

## 2024-08-22 DIAGNOSIS — M25.531 PAIN IN RIGHT WRIST: Primary | ICD-10-CM

## 2024-08-22 DIAGNOSIS — M62.81 MUSCLE WEAKNESS: ICD-10-CM

## 2024-08-22 PROCEDURE — 97165 OT EVAL LOW COMPLEX 30 MIN: CPT | Mod: PN

## 2024-08-22 PROCEDURE — 97018 PARAFFIN BATH THERAPY: CPT | Mod: PN

## 2024-08-22 PROCEDURE — 97530 THERAPEUTIC ACTIVITIES: CPT | Mod: PN

## 2024-08-22 NOTE — PATIENT INSTRUCTIONS
"[facility] - OCCUPATIONAL THERAPY  HOME EXERCISE PROGRAM     Complete the following massages for 5 minutes each, 1x/day.                       Complete the following exercises with 10 repetitions each, 2x/day.       AROM: Isolated MCP Flexion / Extension ("Wave")   Bend only your large, bottom knuckles. Hold 3 seconds. Keep the tips of your fingers straight. Straighten fingers.    AROM: Isolated IPJ Flexion / Extension ("Hook")  Bend only your middle and end knuckles. Hold 3 seconds.   Straighten your fingers.     AROM: MCP and PIP Flexion / Extension ("Straight Fist")  Bend your bottom and middle knuckles, keeping the tips of your fingers straight. Try to touch the pads of your fingers on your palm. Hold 3 seconds. Straighten your fingers.     AROM: Composite Flexion / Extension ("Full Fist")  Bend every joint in your hand into a fist. Hold 3 seconds. Straighten your fingers.     AROM: Composte Extension ("Finger Lifts")  Lift your finger off of the table one at a time. Hold 3 seconds. Relax your finger.    AROM: Abduction / Adduction  With hand flat on table, spread all fingers apart, then bring them together as close as possible.    AROM: Supination / Pronation   With your elbow by your side, turn your palm up then turn your palm down.     AROM: Wrist Flexion / Extension               Bend your wrist forward and back as far as possible.      AROM: Wrist Radial / Ulnar Deviation  Bend your wrist from side to side as far as possible.    AROM: Wrist Flexion / Extension  Make a fist, then bend your wrist forward then back as far as possible.         AROM: Wrist Radial / Ulnar Deviation   Make a fist then bend your wrist toward your body, then away.         Copyright © I. All rights reserved.     Therapist: Ayla Moore, OTD, OTR/L      "

## 2024-08-22 NOTE — PLAN OF CARE
[facility]  Occupational Therapy Initial Evaluation     Name: Adeel Kern  Clinic Number: [id number]    Therapy Diagnosis:   Encounter Diagnoses   Name Primary?    Pain in both wrists     Pain in right wrist Yes    Pain in left wrist     Muscle weakness      Physician: Irma Burgos F*    Physician Orders: Eval and Treat  Medical Diagnosis: M25.531,M25.532 (ICD-10-CM) - Pain in both wrists  Surgical Procedure and Date: n/a / Date of Injury: 1 year ago  Evaluation Date: 8/22/2024  Insurance Authorization Period Expiration: 12/31/2024  Plan of Care Certification Period: 11/1/2024  Date of Return to MD: tbd  Visit # / Visits authorized: 1 / 1  FOTO: 1/3    Precautions:  Standard    Time In: 2:45 pm  Time Out: 3:25 pm  Total Appointment Time (timed & untimed codes): 40 minutes    Subjective     Date of Onset: 1 year ago    History of Current Condition/Mechanism of Injury: The pain is present in the left wrist and right wrist. The current episode started more than 1 year ago. There has been no history of extremity trauma. The problem occurs every several days. The problem has been rapidly worsening. The quality of the pain is described as aching. Associated symptoms include joint swelling and a limited range of motion.     Involved Side: bilateral  Dominant Side: Right    Mechanism of Injury: overuse  Surgical Procedure: n/a  Imaging: none     Prior Therapy: n/a    Pain:  Functional Pain Scale Rating 0-10:   4/10 on average  2/10 at best  8/10 at worst  Location: bilateral wrists L>R  Description: Aching  Aggravating Factors: pushing up on hands, floor exercises, lifting   Easing Factors: nothing    Occupation:    Working presently: employed - TA   Duties: B/IADLs, admin duties - no intense manual labor    Functional Limitations/Social History:    Previous functional status includes: Independent with all ADLs.     Current Functional Status   Home/Living environment: lives with their  family      Limitation of Functional Status as follows:   ADLs/IADLs:     - Feeding: ind    - Bathing: ind    - Dressing/Grooming: mod I    - Home Management: difficulty with lift (L)    - Driving: ind     Leisure: working out, shopping    Patient's Goals for Therapy: decrease pain and regain strength to perform activities    Past Medical History/Physical Systems Review:   Adeel Kern  has a past medical history of Urinary tract infection and Vaginal infection.    Adeel Kern  has a past surgical history that includes breast augmentation and teeth removal.    Adeel has a current medication list which includes the following prescription(s): botox, cephalexin, enoxaparin, mirena, and oxycodone-acetaminophen.    Review of patient's allergies indicates:  No Known Allergies     Objective     Observation/Appearance: TTP at the L DRUJ and some mild swelling/stiffness; negative with all provocative testing    Special Tests:   Neg (L) monrdagon shift test  Neg (L) DRUJ instability     Edema. Measured in centimeters.   8/22/2024 8/22/2024      Left Right     Wrist Crease 15.3 15     Distal Palmar Crease 18 18         Elbow and Wrist ROM. Measured in degrees.   8/22/2024 8/22/2024      Left Right     Elbow Ext/Flex       Supination/Pronation       Wrist Ext/Flex 57/78 69/70     Wrist RD/UD 15/40 20/35       Hand ROM. Measured in degrees.   8/22/2024 8/22/2024      Left Right     Full fist WNL WNL        Strength (Dynamometer) and Pinch Strength (Pinch Gauge)  Measured in pounds.   8/22/2024 8/22/2024      Left Right     Rung II 5# 20#     Harman Pinch 5# 9#     3pt Pinch 5# 9#       Sensation: not formally assessed  Sensation intact      CMS Impairment/Limitation/Restriction for FOTO wrist Survey    Therapist reviewed FOTO scores for Adeel Kern on 8/22/2024.   FOTO documents entered into [facility] - see Media section.    Limitation Score: 56%         Treatment     Total Treatment time (time-based codes) separate from  Evaluation: 20 minutes    Adeel received the following supervised modalities after being cleared for contradictions for 5 minutes:   -Patient tolerates paraffin w/ MHP to bilateral hand for 5 min, pre-tx to decrease pain & increase tissue extensibility concurrent with assessment of deficits.     Adeel received the following manual therapy techniques for 5 minutes:   -I provide gentle STM to L hand in order to increase soft tissue extensibility, decrease pain, and tenderness/hypersensitivity in the stated area, and promote scar tissue remodeling.      Adeel received therapeutic activities for 10 minutes including:  - Therapist A PROM   - HEP review  - TGEs  - wrist ROM   - modality ed     Patient Education and Home Exercises      Education provided:   -role of OT, goals for OT, scheduling/cancellations, insurance limitations with patient.  -Additional Education provided:   - HEP in place    Written Home Exercises Provided: yes.  Exercises were reviewed and Adeel was able to demonstrate them prior to the end of the session.    Adeel demonstrated good  understanding of the education provided.     Pt was advised to perform these exercises free of pain, and to stop performing them if pain occurs.    See EMR under Patient Instructions for exercises provided 8/22/2024.    Assessment     Adeel Kern is a 29 y.o. female referred to outpatient occupational therapy and presents with a medical diagnosis of M25.531,M25.532 (ICD-10-CM) - Pain in both wrists.  She has not had imaging yet, but goes on the 28th for xrays. Onset began about a year ago, but she has not tried anything to help with the pain. Pain is localized at the DRUJ, but no true instability. There is some mild swelling and stiffness of the L wrist.   Upon assessment, deficits include: bilateral wrist pain, stiffness, and weakness   Patient also reports functional difficulty with: pushing up on hands, pushing/pulling motions, lifting, carrying shopping  bags  I initiate HEP per protocol including: TGEs, wrist ROM, pain modalities. Patient received written handouts and demos indep with HEP. Skilled education provided to patient as indicated. Continue to progress per protocol as tolerated until patient meets LTGs.      Assessment of Occupational Performance   Performance Deficits    Physical:  Joint Mobility  Joint Stability  Muscle Power/Strength  Muscle Endurance  Edema   Strength  Pinch Strength  Gross Motor Coordination  Fine Motor Coordination  Pain    Cognitive:  No Deficits    Psychosocial:    No Deficits     Following medical record review it is determined that pt will benefit from occupational therapy services in order to maximize pain free and/or functional use of bilateral wrists L>R. The following goals were discussed with the patient and patient is in agreement with them as to be addressed in the treatment plan. The patient's rehab potential is Good.     Anticipated barriers to occupational therapy: n/a    Plan of care discussed with patient: Yes  Patient's spiritual, cultural and educational needs considered and patient is agreeable to the plan of care and goals as stated below:     Medical Necessity is demonstrated by the following  Occupational Profile/History  Co-morbidities and personal factors that may impact the plan of care [x] LOW: Brief chart review  [] MODERATE: Expanded chart review   [] HIGH: Extensive chart review    Moderate / High Support Documentation: n/a     Examination  Performance deficits relating to physical, cognitive or psychosocial skills that result in activity limitations and/or participation restrictions  [x] LOW: addressing 1-3 Performance deficits  [] MODERATE: 3-5 Performance deficits  [] HIGH: 5+ Performance deficits (please support below)    Moderate / High Support Documentation: n/a     Treatment Options [x] LOW: Limited options  [] MODERATE: Several options  [] HIGH: Multiple options      Decision Making/  Complexity Score: low       Goals:   The following goals were discussed with the patient and patient is in agreement with them as to be addressed in the treatment plan.   Long Term Goals:  Goals to be met by discharge:  1) Independent with HEP   2) Pt will decrease pain to trace or none while completing light home management tasks or work related tasks by d/c.   3) Patient will be able to name and apply 2 joint protection techniques.  4) Pt will increase functional  strength by 5# in order to A in opening containers for med management or home management tasks by 4 weeks.   5) Patient will be able to achieve less than or equal to 40% on the FOTO, demonstrating overall improved functional ability with upper extremity. (Self-care category)      Plan     Certification Period/Plan of care expiration: 8/22/2024 to 11/1/2024.    Outpatient Occupational Therapy 1-2 times weekly for 6 weeks to include the following interventions: Paraffin, Fluidotherapy, Manual therapy/joint mobilizations, Modalities for pain management, US 3 mhz, Therapeutic exercises/activities., Iontophoresis with 2.0 cc Dexamethasone, Strengthening, Orthotic Fabrication/Fit/Training, Edema Control, Scar Management, Wound Care, Electrical Modalities, Joint Protection, and Energy Conservation.    Ayla Moore, OT

## 2024-08-28 ENCOUNTER — HOSPITAL ENCOUNTER (OUTPATIENT)
Dept: RADIOLOGY | Facility: HOSPITAL | Age: 29
Discharge: HOME | End: 2024-08-28
Payer: MEDICAID

## 2024-08-28 DIAGNOSIS — M25.531 PAIN IN BOTH WRISTS: ICD-10-CM

## 2024-08-28 DIAGNOSIS — M25.532 PAIN IN BOTH WRISTS: ICD-10-CM

## 2024-08-28 PROCEDURE — 73110 X-RAY EXAM OF WRIST: CPT | Mod: 26,50,, | Performed by: RADIOLOGY

## 2024-08-28 PROCEDURE — 73110 X-RAY EXAM OF WRIST: CPT | Mod: TC,50,FY

## 2025-02-25 ENCOUNTER — OFFICE VISIT (OUTPATIENT)
Dept: PRIMARY CARE CLINIC | Facility: CLINIC | Age: 30
End: 2025-02-25
Payer: MEDICAID

## 2025-02-25 ENCOUNTER — LAB VISIT (OUTPATIENT)
Dept: PRIMARY CARE CLINIC | Facility: CLINIC | Age: 30
End: 2025-02-25
Payer: MEDICAID

## 2025-02-25 VITALS
TEMPERATURE: 98 F | HEART RATE: 76 BPM | OXYGEN SATURATION: 99 % | BODY MASS INDEX: 26.75 KG/M2 | WEIGHT: 146.25 LBS | DIASTOLIC BLOOD PRESSURE: 75 MMHG | SYSTOLIC BLOOD PRESSURE: 109 MMHG

## 2025-02-25 DIAGNOSIS — R53.83 OTHER FATIGUE: ICD-10-CM

## 2025-02-25 DIAGNOSIS — R82.90 ABNORMAL URINE ODOR: ICD-10-CM

## 2025-02-25 DIAGNOSIS — N30.01 ACUTE CYSTITIS WITH HEMATURIA: Primary | ICD-10-CM

## 2025-02-25 DIAGNOSIS — K29.50 CHRONIC GASTRITIS WITHOUT BLEEDING, UNSPECIFIED GASTRITIS TYPE: ICD-10-CM

## 2025-02-25 DIAGNOSIS — F41.9 ANXIETY: ICD-10-CM

## 2025-02-25 LAB
25(OH)D3+25(OH)D2 SERPL-MCNC: 17 NG/ML (ref 30–96)
ALBUMIN SERPL BCP-MCNC: 4.3 G/DL (ref 3.5–5.2)
ALP SERPL-CCNC: 43 U/L (ref 40–150)
ALT SERPL W/O P-5'-P-CCNC: 13 U/L (ref 10–44)
ANION GAP SERPL CALC-SCNC: 7 MMOL/L (ref 8–16)
AST SERPL-CCNC: 51 U/L (ref 10–40)
BASOPHILS # BLD AUTO: 0.03 K/UL (ref 0–0.2)
BASOPHILS NFR BLD: 0.5 % (ref 0–1.9)
BILIRUB SERPL-MCNC: 0.4 MG/DL (ref 0.1–1)
BILIRUB SERPL-MCNC: NEGATIVE MG/DL
BLOOD, POC UA: NORMAL
BUN SERPL-MCNC: 9 MG/DL (ref 6–20)
CALCIUM SERPL-MCNC: 9.5 MG/DL (ref 8.7–10.5)
CHLORIDE SERPL-SCNC: 109 MMOL/L (ref 95–110)
CO2 SERPL-SCNC: 22 MMOL/L (ref 23–29)
CREAT SERPL-MCNC: 0.7 MG/DL (ref 0.5–1.4)
DIFFERENTIAL METHOD BLD: ABNORMAL
EOSINOPHIL # BLD AUTO: 0.1 K/UL (ref 0–0.5)
EOSINOPHIL NFR BLD: 1 % (ref 0–8)
ERYTHROCYTE [DISTWIDTH] IN BLOOD BY AUTOMATED COUNT: 12.7 % (ref 11.5–14.5)
EST. GFR  (NO RACE VARIABLE): >60 ML/MIN/1.73 M^2
GLUCOSE SERPL-MCNC: 83 MG/DL (ref 70–110)
GLUCOSE UR QL STRIP: NEGATIVE
HCT VFR BLD AUTO: 39.1 % (ref 37–48.5)
HGB BLD-MCNC: 13.4 G/DL (ref 12–16)
IMM GRANULOCYTES # BLD AUTO: 0.02 K/UL (ref 0–0.04)
IMM GRANULOCYTES NFR BLD AUTO: 0.3 % (ref 0–0.5)
KETONES UR QL STRIP: NEGATIVE
LEUKOCYTE ESTERASE URINE, POC: NORMAL
LYMPHOCYTES # BLD AUTO: 2.3 K/UL (ref 1–4.8)
LYMPHOCYTES NFR BLD: 37.5 % (ref 18–48)
MCH RBC QN AUTO: 33.7 PG (ref 27–31)
MCHC RBC AUTO-ENTMCNC: 34.3 G/DL (ref 32–36)
MCV RBC AUTO: 98 FL (ref 82–98)
MONOCYTES # BLD AUTO: 0.4 K/UL (ref 0.3–1)
MONOCYTES NFR BLD: 5.9 % (ref 4–15)
NEUTROPHILS # BLD AUTO: 3.4 K/UL (ref 1.8–7.7)
NEUTROPHILS NFR BLD: 54.8 % (ref 38–73)
NITRITE, POC UA: POSITIVE
NRBC BLD-RTO: 0 /100 WBC
PH, POC UA: 5
PLATELET # BLD AUTO: 345 K/UL (ref 150–450)
PMV BLD AUTO: 10.4 FL (ref 9.2–12.9)
POTASSIUM SERPL-SCNC: 4.2 MMOL/L (ref 3.5–5.1)
PROT SERPL-MCNC: 7.9 G/DL (ref 6–8.4)
PROTEIN, POC: NEGATIVE
RBC # BLD AUTO: 3.98 M/UL (ref 4–5.4)
SODIUM SERPL-SCNC: 138 MMOL/L (ref 136–145)
SPECIFIC GRAVITY, POC UA: NORMAL
UROBILINOGEN, POC UA: NORMAL
VIT B12 SERPL-MCNC: >2000 PG/ML (ref 210–950)
WBC # BLD AUTO: 6.22 K/UL (ref 3.9–12.7)

## 2025-02-25 PROCEDURE — 1159F MED LIST DOCD IN RCRD: CPT | Mod: CPTII,,,

## 2025-02-25 PROCEDURE — 81003 URINALYSIS AUTO W/O SCOPE: CPT | Mod: PBBFAC,PN

## 2025-02-25 PROCEDURE — 85025 COMPLETE CBC W/AUTO DIFF WBC: CPT

## 2025-02-25 PROCEDURE — 82306 VITAMIN D 25 HYDROXY: CPT

## 2025-02-25 PROCEDURE — 82607 VITAMIN B-12: CPT

## 2025-02-25 PROCEDURE — 81001 URINALYSIS AUTO W/SCOPE: CPT

## 2025-02-25 PROCEDURE — 87088 URINE BACTERIA CULTURE: CPT

## 2025-02-25 PROCEDURE — 99999PBSHW POCT URINALYSIS: Mod: PBBFAC,,,

## 2025-02-25 PROCEDURE — 80053 COMPREHEN METABOLIC PANEL: CPT

## 2025-02-25 PROCEDURE — 1160F RVW MEDS BY RX/DR IN RCRD: CPT | Mod: CPTII,,,

## 2025-02-25 PROCEDURE — 3074F SYST BP LT 130 MM HG: CPT | Mod: CPTII,,,

## 2025-02-25 PROCEDURE — 3008F BODY MASS INDEX DOCD: CPT | Mod: CPTII,,,

## 2025-02-25 PROCEDURE — 99213 OFFICE O/P EST LOW 20 MIN: CPT | Mod: PBBFAC,PN

## 2025-02-25 PROCEDURE — 87086 URINE CULTURE/COLONY COUNT: CPT

## 2025-02-25 PROCEDURE — 87186 SC STD MICRODIL/AGAR DIL: CPT

## 2025-02-25 PROCEDURE — 3078F DIAST BP <80 MM HG: CPT | Mod: CPTII,,,

## 2025-02-25 PROCEDURE — 99999 PR PBB SHADOW E&M-EST. PATIENT-LVL III: CPT | Mod: PBBFAC,,,

## 2025-02-25 PROCEDURE — 99214 OFFICE O/P EST MOD 30 MIN: CPT | Mod: S$PBB,,,

## 2025-02-25 RX ORDER — OMEPRAZOLE 20 MG/1
20 CAPSULE, DELAYED RELEASE ORAL DAILY
Qty: 90 CAPSULE | Refills: 3 | Status: SHIPPED | OUTPATIENT
Start: 2025-02-25 | End: 2026-02-25

## 2025-02-25 RX ORDER — NITROFURANTOIN 25; 75 MG/1; MG/1
100 CAPSULE ORAL 2 TIMES DAILY
Qty: 14 CAPSULE | Refills: 0 | Status: SHIPPED | OUTPATIENT
Start: 2025-02-25 | End: 2025-03-04

## 2025-02-25 RX ORDER — PHENAZOPYRIDINE HYDROCHLORIDE 100 MG/1
100 TABLET, FILM COATED ORAL 3 TIMES DAILY PRN
Qty: 15 TABLET | Refills: 0 | Status: SHIPPED | OUTPATIENT
Start: 2025-02-25 | End: 2025-03-02

## 2025-02-25 NOTE — LETTER
February 25, 2025      [facility]  [address] [place] [id number]       Patient: Adeel Kern  YOB: 1995  Date of Visit: 02/25/2025    To Whom It May Concern:    Adeel Kren was at [facility] on 02/25/2025. She may return to work/school on 2- with no restrictions. If you have any questions or concerns, or if I can be of further assistance, please do not hesitate to contact my office.    Sincerely,    Rashid Ch LPN

## 2025-02-25 NOTE — PROGRESS NOTES
SUBJECTIVE     Chief Complaint   Patient presents with    Urinary Tract Infection    Follow-up       HPI  Adeel Kern is a 29 y.o. female with multiple medical diagnoses as listed in the medical history and problem list that presents for evaluation of UTI symptoms     HPI     History of Present Illness    CHIEF COMPLAINT:  Patient presents today with urinary symptoms.    URINARY SYMPTOMS:  She reports urinary symptoms since Friday including dysuria and strong, sour-smelling urine. Current symptoms are similar to previous episodes of cystitis that have typically self-resolved. She has history of kidney infection three years ago requiring nephrology consultation and specialized antibiotic treatment.    GASTROINTESTINAL:  She has history of gastritis since childhood in [facility] with severe abdominal pain and nausea triggered by poor diet choices. She reports gluten sensitivity with symptoms of abdominal pain and bloating after bread consumption. She avoids alcohol due to severe gastritis symptoms. Previous gastroenterology evaluation was completed in [facility] without recent workup at current location.    TEMPERATURE DYSREGULATION:  She experiences persistent cold sensations throughout the day, particularly at school, with uncontrollable shaking and tremors affecting her entire body. These symptoms are severe enough to interfere with daily activities.    FATIGUE:  She reports daytime sleepiness and low energy despite maintaining regular sleep schedule from 8:30 PM to 6:00 AM. She received B12 injection yesterday from her sister. She discontinued other vitamins due to increased appetite.    ANXIETY:  She experienced a panic attack during go-kart driving characterized by intense fear, dizziness, nausea, hearing difficulties, and abdominal pain. She felt she was driving unusually slow during the episode and had sensation of impending syncope. She was unable to eat following the incident.    MEDICATIONS:  She has  Mirena IUD and currently takes compound Ozempic.    ALLERGIES:  She reports previous adverse reaction to Oxycontin post-surgery characterized by feeling of impending doom, hypotension, and pallor.      ROS:  General: -fever, -chills, +fatigue, -weight gain, -weight loss  Eyes: -vision changes, -redness, -discharge  ENT: -ear pain, -nasal congestion, -sore throat  Cardiovascular: -chest pain, -palpitations, -lower extremity edema  Respiratory: -cough, -shortness of breath  Gastrointestinal: -abdominal pain, -nausea, -vomiting, -diarrhea, -constipation, -blood in stool  Genitourinary: +dysuria, -hematuria, -frequency  Musculoskeletal: -joint pain, -muscle pain  Skin: -rash, -lesion  Neurological: -headache, -dizziness, -numbness, -tingling, +tremors  Psychiatric: -anxiety, -depression, -sleep difficulty         PAST MEDICAL HISTORY:  Past Medical History:   Diagnosis Date    Urinary tract infection     Vaginal infection        ALLERGIES AND MEDICATIONS: updated and reviewed.  Review of patient's allergies indicates:  No Known Allergies  Current Outpatient Medications   Medication Sig Dispense Refill    levonorgestreL (MIRENA) 21 mcg/24 hours (8 yrs) 52 mg IUD Mirena (52 MG)      nitrofurantoin, macrocrystal-monohydrate, (MACROBID) 100 MG capsule Take 1 capsule (100 mg total) by mouth 2 (two) times daily. for 7 days 14 capsule 0    omeprazole (PRILOSEC) 20 MG capsule Take 1 capsule (20 mg total) by mouth once daily. 90 capsule 3    phenazopyridine (PYRIDIUM) 100 MG tablet Take 1 tablet (100 mg total) by mouth 3 (three) times daily as needed for Pain. 15 tablet 0     No current facility-administered medications for this visit.       OBJECTIVE     Physical Exam  Vitals:    02/25/25 0821   BP: 109/75   Pulse: 76   Temp: 98.3 °F (36.8 °C)    Body mass index is 26.75 kg/m².  Weight: 66.3 kg (146 lb 4.4 oz)         Physical Exam  Vitals reviewed.   Constitutional:       General: She is not in acute distress.  HENT:       Right Ear: External ear normal.      Left Ear: External ear normal.      Nose: Nose normal.      Mouth/Throat:      Mouth: Mucous membranes are moist.   Eyes:      Extraocular Movements: Extraocular movements intact.      Conjunctiva/sclera: Conjunctivae normal.      Pupils: Pupils are equal, round, and reactive to light.   Cardiovascular:      Rate and Rhythm: Normal rate and regular rhythm.      Pulses: Normal pulses.      Heart sounds: Normal heart sounds.   Pulmonary:      Effort: Pulmonary effort is normal.      Breath sounds: Normal breath sounds.   Abdominal:      General: Abdomen is flat. Bowel sounds are normal. There is no distension.      Palpations: Abdomen is soft.      Tenderness: There is no abdominal tenderness. There is no right CVA tenderness or left CVA tenderness.   Musculoskeletal:         General: No swelling. Normal range of motion.      Cervical back: Normal range of motion.   Skin:     General: Skin is warm and dry.      Findings: No rash.   Neurological:      General: No focal deficit present.      Mental Status: She is alert and oriented to person, place, and time.   Psychiatric:         Mood and Affect: Mood normal.         Behavior: Behavior normal.         Thought Content: Thought content normal.         Judgment: Judgment normal.       Health Maintenance         Date Due Completion Date    TETANUS VACCINE Never done ---    Influenza Vaccine (1) Never done ---    COVID-19 Vaccine (3 - 2024-25 season) 09/01/2024 5/12/2021    Cervical Cancer Screening 07/21/2028 7/21/2023    RSV Vaccine (Age 60+ and Pregnant patients) (1 - 1-dose 75+ series) 03/25/2068 ---          ASSESSMENT     29 y.o. female with     1. Acute cystitis with hematuria    2. Abnormal urine odor    3. Other fatigue    4. Chronic gastritis without bleeding, unspecified gastritis type    5. Anxiety        PLAN:     1. Acute cystitis with hematuria  New. Treating.   - nitrofurantoin, macrocrystal-monohydrate, (MACROBID) 100  MG capsule; Take 1 capsule (100 mg total) by mouth 2 (two) times daily. for 7 days  Dispense: 14 capsule; Refill: 0  - phenazopyridine (PYRIDIUM) 100 MG tablet; Take 1 tablet (100 mg total) by mouth 3 (three) times daily as needed for Pain.  Dispense: 15 tablet; Refill: 0    2. Abnormal urine odor  Ordered labs to check levels. Will treat accordingly once labs are reviewed.    - POCT URINALYSIS  - Urinalysis  - Urine Culture High Risk    3. Other fatigue  Ordered labs to check levels. Will treat accordingly once labs are reviewed.    - CBC Auto Differential; Future  - Vitamin D; Future  - Vitamin B12; Future  - Comprehensive Metabolic Panel; Future    4. Chronic gastritis without bleeding, unspecified gastritis type  Chronic. Start omeprazole 20 mg daily. Follow an antireflux regimen.  This includes:       - Do not lie down for at least 3 to 4 hours after meals.        - Raise the head of the bed 4 to 6 inches.        - Decrease excess weight.        - Avoid citrus juices and other acidic foods, alcohol, chocolate, mints, coffee and other caffeinated beverages, carbonated beverages, fatty and fried foods.        - Avoid tight-fitting clothing.        - Avoid cigarettes and other tobacco products.    - Ambulatory referral/consult to Gastroenterology; Future  - omeprazole (PRILOSEC) 20 MG capsule; Take 1 capsule (20 mg total) by mouth once daily.  Dispense: 90 capsule; Refill: 3    5. Anxiety  Encouraged the patient to perform self-calming techniques, such as deep breathing/relaxation techniques and exercise. Discussed box breathing technique with patient.       Assessment & Plan    IMPRESSION:  - Suspected urinary tract infection based on symptoms; initiated empiric antibiotic treatment with Macrobid while awaiting urine culture results  - Considered possibility of gluten sensitivity due to reported GI symptoms after consuming bread products  - Evaluated reported fatigue and coldness; ordered CBC to rule out  anemia  - Assessed recent episode of anxiety and physical symptoms during go-kart ride as possible panic attack  - Considered potential side effects of semaglutide, including depression and anxiety, in relation to recent symptoms  -Discussed possible side effects of semaglutide, including N/V/D, constipation, depression, anxiety, suicidal thoughts to name a few. Patient verbalized understanding.     URINARY TRACT INFECTION:  - Educated the patient on proper hygiene practices after sexual activity to prevent urinary tract infections.  - Prescribed Macrobid 100mg twice daily for 7 days for suspected urinary tract infection.  - Prescribed Pyridium up to 3 times daily as needed for urinary pain relief for up to 4 days.  - Explained potential side effects, including orange discoloration of urine and other bodily fluids.  - Ordered urine culture to identify bacteria.  - Instructed the patient to contact the office if urinary symptoms persist after completing antibiotic course.  - Scheduled follow-up regarding urine culture results and potential antibiotic adjustment.  - Patient reports urinary symptoms and pain when urinating since Friday, with a strong, sour smell when urinating.    GASTRITIS:  - Prescribed omeprazole 20mg daily before meals or as needed for gastric symptoms; can increase to 40mg daily if needed.  - Referred the patient to gastroenterologist for endoscopy, biopsy, and H. pylori testing.  - Patient reports a history of gastritis since childhood and current abdominal pain.  - Confirmed no bleeding with gastritis.    ANXIETY AND PANIC ATTACKS:  - Discussed box breathing technique for managing anxiety symptoms.  - Patient reports experiencing intense fear, physical symptoms, and catastrophic thoughts during a go-kart ride.  - Assessed the episode as a panic attack.  - Advised the patient to track frequency of panic attacks.  - Instructed the patient to monitor frequency and severity of anxiety symptoms,  especially in relation to semaglutide use.  - Advised the patient to contact the office if experiencing worsening depression or anxiety symptoms, particularly in relation to semaglutide use.      MEDICATIONS/SUPPLEMENTS:  - Confirmed the presence of Mirena IUD.  - Patient is currently taking compound semaglutide injections; next dose due on Wednesday. Patient is getting semaglutide from another clinic.   - Discussed possible side effects of semaglutide, including depression and anxiety.  - Recommend ensuring adequate protein intake while using semaglutide to prevent blood sugar drops.  - Recommend vitamins and healthy lifestyle to improve overall well-being.    LABS:  - CMP ordered.  - Vitamin D level ordered.  - Vitamin B12 level ordered.  - Complete blood count (CBC) ordered.    OTHER INSTRUCTIONS:  - Patient to resume regular exercise routine.         ELIF Merida  [facility]  02/25/2025 8:43 AM    Follow up if symptoms worsen or fail to improve.    This note was generated with the assistance of ambient listening technology. Verbal consent was obtained by the patient and accompanying visitor(s) for the recording of patient appointment to facilitate this note. I attest to having reviewed and edited the generated note for accuracy, though some syntax or spelling errors may persist. Please contact the author of this note for any clarification.

## 2025-02-26 LAB
BACTERIA #/AREA URNS AUTO: NORMAL /HPF
BILIRUB UR QL STRIP: NEGATIVE
CLARITY UR REFRACT.AUTO: CLEAR
COLOR UR AUTO: YELLOW
GLUCOSE UR QL STRIP: NEGATIVE
HGB UR QL STRIP: ABNORMAL
KETONES UR QL STRIP: NEGATIVE
LEUKOCYTE ESTERASE UR QL STRIP: ABNORMAL
MICROSCOPIC COMMENT: NORMAL
NITRITE UR QL STRIP: NEGATIVE
PH UR STRIP: 6 [PH] (ref 5–8)
PROT UR QL STRIP: NEGATIVE
RBC #/AREA URNS AUTO: 1 /HPF (ref 0–4)
SP GR UR STRIP: 1 (ref 1–1.03)
SQUAMOUS #/AREA URNS AUTO: 3 /HPF
URN SPEC COLLECT METH UR: ABNORMAL
WBC #/AREA URNS AUTO: 2 /HPF (ref 0–5)

## 2025-02-28 LAB — BACTERIA UR CULT: ABNORMAL

## 2025-03-12 ENCOUNTER — RESULTS FOLLOW-UP (OUTPATIENT)
Dept: PRIMARY CARE CLINIC | Facility: CLINIC | Age: 30
End: 2025-03-12

## 2025-03-12 NOTE — PROGRESS NOTES
Attempted to contact patient regarding recent lab results. No answer to available phone number. Left VM for patient to send messages of good time to discuss lab results.

## 2025-03-14 NOTE — PROGRESS NOTES
Spoke with patient regarding recent lab results. Advised patient to take a daily multivitamin with D3. Will recheck labs in 3-6 months.

## 2025-03-27 ENCOUNTER — OFFICE VISIT (OUTPATIENT)
Dept: PRIMARY CARE CLINIC | Facility: CLINIC | Age: 30
End: 2025-03-27
Payer: MEDICAID

## 2025-03-27 VITALS
OXYGEN SATURATION: 99 % | TEMPERATURE: 98 F | DIASTOLIC BLOOD PRESSURE: 67 MMHG | WEIGHT: 142.5 LBS | SYSTOLIC BLOOD PRESSURE: 102 MMHG | HEART RATE: 84 BPM | BODY MASS INDEX: 26.07 KG/M2

## 2025-03-27 DIAGNOSIS — K52.9 GASTROENTERITIS: ICD-10-CM

## 2025-03-27 DIAGNOSIS — R19.7 DIARRHEA, UNSPECIFIED TYPE: ICD-10-CM

## 2025-03-27 DIAGNOSIS — R51.9 FREQUENT HEADACHES: ICD-10-CM

## 2025-03-27 DIAGNOSIS — R11.2 NAUSEA AND VOMITING, UNSPECIFIED VOMITING TYPE: Primary | ICD-10-CM

## 2025-03-27 PROCEDURE — 99999 PR PBB SHADOW E&M-EST. PATIENT-LVL III: CPT | Mod: PBBFAC,,,

## 2025-03-27 PROCEDURE — G2211 COMPLEX E/M VISIT ADD ON: HCPCS | Mod: S$PBB,,,

## 2025-03-27 PROCEDURE — 1159F MED LIST DOCD IN RCRD: CPT | Mod: CPTII,,,

## 2025-03-27 PROCEDURE — 99214 OFFICE O/P EST MOD 30 MIN: CPT | Mod: S$PBB,,,

## 2025-03-27 PROCEDURE — 99213 OFFICE O/P EST LOW 20 MIN: CPT | Mod: PBBFAC,PN

## 2025-03-27 PROCEDURE — 1160F RVW MEDS BY RX/DR IN RCRD: CPT | Mod: CPTII,,,

## 2025-03-27 PROCEDURE — 3008F BODY MASS INDEX DOCD: CPT | Mod: CPTII,,,

## 2025-03-27 PROCEDURE — 3078F DIAST BP <80 MM HG: CPT | Mod: CPTII,,,

## 2025-03-27 PROCEDURE — 3074F SYST BP LT 130 MM HG: CPT | Mod: CPTII,,,

## 2025-03-27 RX ORDER — NAPROXEN 500 MG/1
500 TABLET ORAL 2 TIMES DAILY WITH MEALS
Qty: 30 TABLET | Refills: 0 | Status: SHIPPED | OUTPATIENT
Start: 2025-03-27 | End: 2025-04-11

## 2025-03-27 RX ORDER — DICYCLOMINE HYDROCHLORIDE 10 MG/1
10 CAPSULE ORAL
Qty: 40 CAPSULE | Refills: 0 | Status: SHIPPED | OUTPATIENT
Start: 2025-03-27 | End: 2025-04-06

## 2025-03-27 RX ORDER — ONDANSETRON 4 MG/1
4 TABLET, ORALLY DISINTEGRATING ORAL 2 TIMES DAILY
Qty: 14 TABLET | Refills: 0 | Status: SHIPPED | OUTPATIENT
Start: 2025-03-27 | End: 2025-04-03

## 2025-03-27 NOTE — PROGRESS NOTES
SUBJECTIVE     Chief Complaint   Patient presents with    Abdominal Pain    Headache    Follow-up    Nausea    Diarrhea    Emesis    Shoulder Pain       HPI  Adeel Kern is a 30 y.o. female with no significant past medical history that presents for evaluation of multiple GI concerns.     HPI     History of Present Illness    CHIEF COMPLAINT:  Patient presents today with severe headache, nausea, and GI symptoms after taking semaglutide which she purchased from a clinic.     CURRENT SYMPTOMS:  She reports severe nausea and vomiting on Tuesday, followed by early morning diarrhea. She currently experiences gas and burping. She describes feeling extremely full with small amounts of food. She reports a persistent severe headache localized to the back of her eyes, not relieved by medication. She notes extreme fatigue during recent exercise despite regular workout routine.    MEDICATIONS:  Her last dose of semaglutide was taken one week ago. She takes omeprazole as needed before consuming foods that may cause GI discomfort, but does not take it daily and occasionally forgets doses.    MEDICAL HISTORY:  She has a history of gastritis since childhood.    Patient denies any shortness of breath, dizziness, headaches, N/V, vision changes, chest pains, or palpitations at present time.      ROS:  General: -fever, -chills, +fatigue, -weight gain, -weight loss  Eyes: -vision changes, -redness, -discharge, -eye pain  ENT: -ear pain, -nasal congestion, -sore throat  Cardiovascular: -chest pain, -palpitations, -lower extremity edema  Respiratory: -cough, -shortness of breath  Gastrointestinal: +abdominal pain, +nausea, +vomiting, +diarrhea, -constipation, -blood in stool, +loss of appetite  Genitourinary: -dysuria, -hematuria, -frequency  Musculoskeletal: -joint pain, -muscle pain  Skin: -rash, -lesion  Neurological: +headache, -dizziness, -numbness, -tingling  Psychiatric: -anxiety, -depression, -sleep difficulty         PAST  MEDICAL HISTORY:  Past Medical History:   Diagnosis Date    Urinary tract infection     Vaginal infection        ALLERGIES AND MEDICATIONS: updated and reviewed.  Review of patient's allergies indicates:  No Known Allergies  Current Medications[1]    OBJECTIVE     Physical Exam  Vitals:    03/27/25 1313   BP: 102/67   Pulse: 84   Temp: 98.1 °F (36.7 °C)    Body mass index is 26.07 kg/m².  Weight: 64.7 kg (142 lb 8.4 oz)         Physical Exam  Vitals reviewed.   Constitutional:       General: She is not in acute distress.  HENT:      Right Ear: External ear normal.      Left Ear: External ear normal.      Nose: Nose normal.      Mouth/Throat:      Mouth: Mucous membranes are moist.   Eyes:      Extraocular Movements: Extraocular movements intact.      Conjunctiva/sclera: Conjunctivae normal.      Pupils: Pupils are equal, round, and reactive to light.   Cardiovascular:      Rate and Rhythm: Normal rate and regular rhythm.      Pulses: Normal pulses.      Heart sounds: Normal heart sounds.   Pulmonary:      Effort: Pulmonary effort is normal.      Breath sounds: Normal breath sounds.   Abdominal:      General: Bowel sounds are normal. There is no distension.      Palpations: Abdomen is soft.      Tenderness: There is abdominal tenderness in the epigastric area. There is no right CVA tenderness, left CVA tenderness, guarding or rebound.   Musculoskeletal:         General: No swelling. Normal range of motion.      Cervical back: Normal range of motion.   Skin:     General: Skin is warm and dry.      Findings: No rash.   Neurological:      General: No focal deficit present.      Mental Status: She is alert and oriented to person, place, and time.   Psychiatric:         Mood and Affect: Mood normal.         Behavior: Behavior normal.       Health Maintenance         Date Due Completion Date    TETANUS VACCINE Never done ---    Influenza Vaccine (1) Never done ---    COVID-19 Vaccine (3 - 2024-25 season) 09/01/2024  5/12/2021    Cervical Cancer Screening 07/21/2028 7/21/2023    RSV Vaccine (Age 60+ and Pregnant patients) (1 - 1-dose 75+ series) 03/25/2068 ---          ASSESSMENT     30 y.o. female with     1. Nausea and vomiting, unspecified vomiting type    2. Gastroenteritis    3. Frequent headaches    4. Diarrhea, unspecified type        PLAN:     1. Nausea and vomiting, unspecified vomiting type  New. Treating. Counseled patient on monitoring for worsening symptoms, and go to ER for evaluation if symptoms present; patient verbalized understanding.  Instructed to discontinue semaglutide.   Counseled patient of possible causes of nausea. Nausea can have causes that aren't due to underlying disease. Examples include motion such as from a car and plane, taking pills on an empty stomach, eating too much or too little, or drinking too much alcohol.   Counseled on self-treatment of nausea like resting, eating bland foods, and avoiding strong food odors, perfume, smoke, and stuffy rooms, may help reduce nausea. Taking an over-the-counter motion sickness medication may also help ease symptoms.  - ondansetron (ZOFRAN-ODT) 4 MG TbDL; Take 1 tablet (4 mg total) by mouth 2 (two) times daily. for 7 days  Dispense: 14 tablet; Refill: 0    2. Gastroenteritis  New. Treating.   - dicyclomine (BENTYL) 10 MG capsule; Take 1 capsule (10 mg total) by mouth 4 (four) times daily before meals and nightly. for 10 days  Dispense: 40 capsule; Refill: 0    3. Frequent headaches  New. Treating.   Watch for increase pain, fever, vomiting, neck pain or mental confusion.   You can also use tylenol as directed as long as you don't have any allergies to these medications or medical conditions such as ulcers, liver or kidney disease or blood thinners etc that would prevent you from taking these meds.   - naproxen (NAPROSYN) 500 MG tablet; Take 1 tablet (500 mg total) by mouth 2 (two) times daily with meals. for 15 days  Dispense: 30 tablet; Refill: 0    Red  Flag signs include; thunderclap headache, weakness, blurry vision or other sudden sensory deficit, new onset numbness or tingling, shortness of breath, chest pain, nausea, sweating or fatigue. Please go immediately to the Emergency Department with any of these signs.     4. Diarrhea, unspecified type  New. Addressing. Standing orders in place if diarrhea persists.   - Stool Exam-Ova,Cysts,Parasites; Future  - Stool culture; Future  - Stool Exam-Ova,Cysts,Parasites  - Stool culture      Assessment & Plan    IMPRESSION:  - Assessed symptoms as likely due to high dose of semaglutide, resulting in gastroenteritis-like effects.  - Considered possibility of concurrent viral gastroenteritis, but symptoms more consistent with medication side effects.  - Initiated symptomatic treatment for headache and GI distress.  - Plan to monitor for persistent diarrhea and order labs if necessary.    ADVERSE EFFECTS OF SEMAGLUTIDE:  - Evaluated the patient's symptoms as consistent with side effects of high-dose semaglutide.  - Reviewed potential serious side effects of semaglutide, including risk of stomach paralysis.  - Advised the patient to avoid using semaglutide in the future due to adverse reactions.  - Noted that the medication effects should be wearing off as the last dose was taken 1 week ago.  - Discussed importance of proper nutrition and exercise when using weight loss medications.    CHRONIC GASTRITIS AND GASTROENTERITIS:  - Acknowledged the patient's history of gastritis since childhood and its impact on current symptoms.  - Prescribed dicyclomine for gastroenteritis symptoms.  - Prescribed ondansetron (Zofran) for nausea.  - Recommend continuing use of omeprazole before meals to calm abdominal pain.  - Ordered stool culture, to be used if diarrhea persists.  - Recommend dietary changes and coconut water for electrolyte replacement.  - Recommend eating soft, non-fatty, non-spicy foods (e.g., soups, mashed potatoes,  banana).  - Advised dietary changes to manage symptoms.    HEADACHE:  - Prescribed naproxen 500mg twice daily for headache.  - Evaluated the headache as potentially related to lack of nutrition and medication side effects.    EPIGASTRIC PAIN:  - Evaluated the epigastric pain as part of the adverse effects of semaglutide and possible gastroenteritis.  - Prescribed dicyclomine, 1 capsule every 4 hours before meals and at night for abdominal pain.  - Recommend continuing use of omeprazole before meals.  - Instructed the patient to report any worsening or persistent epigastric pain.    FOLLOW-UP:  - Instructed the patient to report any worsening or persistent symptoms.         ELIF Merida  [facility]  03/27/2025 1:18 PM    I spent a total of 35 minutes on the day of the visit.This includes face to face time and non-face to face time preparing to see the patient (eg, review of tests), obtaining and/or reviewing separately obtained history, documenting clinical information in the electronic or other health record, independently interpreting results and communicating results to the patient/family/caregiver, or care coordinator.     Follow up if symptoms worsen or fail to improve.    This note was generated with the assistance of ambient listening technology. Verbal consent was obtained by the patient and accompanying visitor(s) for the recording of patient appointment to facilitate this note. I attest to having reviewed and edited the generated note for accuracy, though some syntax or spelling errors may persist. Please contact the author of this note for any clarification.                   [1]   Current Outpatient Medications   Medication Sig Dispense Refill    levonorgestreL (MIRENA) 21 mcg/24 hours (8 yrs) 52 mg IUD Mirena (52 MG)      omeprazole (PRILOSEC) 20 MG capsule Take 1 capsule (20 mg total) by mouth once daily. 90 capsule 3    dicyclomine (BENTYL) 10 MG capsule Take 1 capsule (10 mg  total) by mouth 4 (four) times daily before meals and nightly. for 10 days 40 capsule 0    naproxen (NAPROSYN) 500 MG tablet Take 1 tablet (500 mg total) by mouth 2 (two) times daily with meals. for 15 days 30 tablet 0    ondansetron (ZOFRAN-ODT) 4 MG TbDL Take 1 tablet (4 mg total) by mouth 2 (two) times daily. for 7 days 14 tablet 0     No current facility-administered medications for this visit.

## 2025-03-27 NOTE — LETTER
March 27, 2025      [facility]  [address] [place] [address]       Patient: Adeel Kern   YOB: 1995  Date of Visit: 03/27/2025    To Whom It May Concern:    Philip Kern  was at [facility] on 03/27/2025. The patient may return to work on 03/31/2025 with no restrictions. If you have any questions or concerns, or if I can be of further assistance, please do not hesitate to contact me.    Sincerely,         ELIF Price

## 2025-04-08 ENCOUNTER — OFFICE VISIT (OUTPATIENT)
Dept: GASTROENTEROLOGY | Facility: CLINIC | Age: 30
End: 2025-04-08
Payer: MEDICAID

## 2025-04-08 VITALS
BODY MASS INDEX: 26.03 KG/M2 | DIASTOLIC BLOOD PRESSURE: 72 MMHG | WEIGHT: 141.44 LBS | SYSTOLIC BLOOD PRESSURE: 108 MMHG | HEART RATE: 74 BPM | HEIGHT: 62 IN

## 2025-04-08 DIAGNOSIS — R68.81 EARLY SATIETY: Primary | ICD-10-CM

## 2025-04-08 DIAGNOSIS — R14.0 BLOATING: ICD-10-CM

## 2025-04-08 DIAGNOSIS — R11.2 NAUSEA AND VOMITING, UNSPECIFIED VOMITING TYPE: ICD-10-CM

## 2025-04-08 PROBLEM — R10.9 ABDOMINAL PAIN IN PREGNANCY: Status: RESOLVED | Noted: 2017-05-09 | Resolved: 2025-04-08

## 2025-04-08 PROBLEM — O26.899 ABDOMINAL PAIN IN PREGNANCY: Status: RESOLVED | Noted: 2017-05-09 | Resolved: 2025-04-08

## 2025-04-08 PROBLEM — J10.1 INFLUENZA B: Status: RESOLVED | Noted: 2024-05-28 | Resolved: 2025-04-08

## 2025-04-08 PROBLEM — R10.9 ABDOMINAL PRESSURE: Status: RESOLVED | Noted: 2017-03-13 | Resolved: 2025-04-08

## 2025-04-08 PROBLEM — O26.899 ABDOMINAL PAIN AFFECTING PREGNANCY: Status: RESOLVED | Noted: 2017-05-11 | Resolved: 2025-04-08

## 2025-04-08 PROBLEM — O36.8190 DECREASED FETAL MOVEMENT: Status: RESOLVED | Noted: 2017-02-20 | Resolved: 2025-04-08

## 2025-04-08 PROBLEM — R10.9 ABDOMINAL PAIN AFFECTING PREGNANCY: Status: RESOLVED | Noted: 2017-05-11 | Resolved: 2025-04-08

## 2025-04-08 PROCEDURE — 3078F DIAST BP <80 MM HG: CPT | Mod: CPTII,,, | Performed by: NURSE PRACTITIONER

## 2025-04-08 PROCEDURE — 3074F SYST BP LT 130 MM HG: CPT | Mod: CPTII,,, | Performed by: NURSE PRACTITIONER

## 2025-04-08 PROCEDURE — 3008F BODY MASS INDEX DOCD: CPT | Mod: CPTII,,, | Performed by: NURSE PRACTITIONER

## 2025-04-08 PROCEDURE — 99204 OFFICE O/P NEW MOD 45 MIN: CPT | Mod: S$PBB,,, | Performed by: NURSE PRACTITIONER

## 2025-04-08 PROCEDURE — 1159F MED LIST DOCD IN RCRD: CPT | Mod: CPTII,,, | Performed by: NURSE PRACTITIONER

## 2025-04-08 PROCEDURE — 99214 OFFICE O/P EST MOD 30 MIN: CPT | Mod: PBBFAC,PN | Performed by: NURSE PRACTITIONER

## 2025-04-08 PROCEDURE — 99999 PR PBB SHADOW E&M-EST. PATIENT-LVL IV: CPT | Mod: PBBFAC,,, | Performed by: NURSE PRACTITIONER

## 2025-04-08 PROCEDURE — 1160F RVW MEDS BY RX/DR IN RCRD: CPT | Mod: CPTII,,, | Performed by: NURSE PRACTITIONER

## 2025-04-08 RX ORDER — ONDANSETRON 4 MG/1
4 TABLET, ORALLY DISINTEGRATING ORAL EVERY 6 HOURS PRN
Qty: 60 TABLET | Refills: 5 | Status: SHIPPED | OUTPATIENT
Start: 2025-04-08

## 2025-04-08 NOTE — LETTER
April 8, 2025      [facility]  [address]  [address]  [place] [address]  Phone: [phone]  Fax: [phone]       Patient: Adeel Kern   YOB: 1995  Date of Visit: 04/08/2025    To Whom It May Concern:    Philip Kern  was at [facility] on 04/08/2025. The patient may return to work/school on 4/9/2025 with no restrictions. If you have any questions or concerns, or if I can be of further assistance, please do not hesitate to contact me.    Sincerely,    ELIF Johnson

## 2025-04-08 NOTE — PROGRESS NOTES
Subjective:       Patient ID: Adeel Kern is a 30 y.o. female.    Chief Complaint: Diarrhea, Nausea, and Emesis    29 y/o female referred by PCP for nausea, vomiting, and diarrhea. Patient reports symptoms started last month after taking Ozempic for weight loss. Last dose 3 week ago.  Feels full after eating small amount of food with intermittent nausea and vomiting. Often awaken from sleep with vomiting of undigested foods. She is no longer having diarrhea. Normal BMs daily for the last 4-5 days. Taking omeprazole as needed with some relief.      services via [employer] language line services.        Past Medical History:   Diagnosis Date    Urinary tract infection     Vaginal infection        Past Surgical History:   Procedure Laterality Date    breast augmentation      teeth removal         Family History   Problem Relation Name Age of Onset    Kidney disease Neg Hx      Breast cancer Neg Hx      Colon cancer Neg Hx      Ovarian cancer Neg Hx         Social History     Socioeconomic History    Marital status:    Tobacco Use    Smoking status: Never     Passive exposure: Never    Smokeless tobacco: Never   Substance and Sexual Activity    Alcohol use: No    Drug use: No    Sexual activity: Yes     Partners: Male     Birth control/protection: None, I.U.D.     Social Drivers of Health     Food Insecurity: No Food Insecurity (8/12/2024)    Hunger Vital Sign     Worried About Running Out of Food in the Last Year: Never true     Ran Out of Food in the Last Year: Never true   Transportation Needs: No Transportation Needs (8/12/2024)    PRAPARE - Transportation     Lack of Transportation (Medical): No     Lack of Transportation (Non-Medical): No   Physical Activity: Sufficiently Active (8/12/2024)    Exercise Vital Sign     Days of Exercise per Week: 4 days     Minutes of Exercise per Session: 60 min   Stress: No Stress Concern Present (8/12/2024)    [employer] - Occupational  "Stress Questionnaire     Feeling of Stress : Not at all   Housing Stability: Unknown (8/12/2024)    Housing Stability Vital Sign     Unable to Pay for Housing in the Last Year: No     Homeless in the Last Year: No       Review of Systems   Constitutional:  Positive for appetite change. Negative for unexpected weight change.   HENT:  Negative for trouble swallowing.    Respiratory:  Negative for shortness of breath.    Cardiovascular:  Negative for chest pain.   Gastrointestinal:  Positive for abdominal distention, nausea and vomiting. Negative for blood in stool, constipation and diarrhea.   Hematological:  Negative for adenopathy. Does not bruise/bleed easily.   Psychiatric/Behavioral:  Negative for dysphoric mood.          Objective:     Vitals:    04/08/25 0821   BP: 108/72   BP Location: Left arm   Patient Position: Sitting   Pulse: 74   Weight: 64.1 kg (141 lb 6.8 oz)   Height: 5' 2" (1.575 m)          Physical Exam  Constitutional:       General: She is not in acute distress.     Appearance: Normal appearance.   HENT:      Head: Normocephalic.   Eyes:      Conjunctiva/sclera: Conjunctivae normal.   Pulmonary:      Effort: Pulmonary effort is normal. No respiratory distress.   Abdominal:      General: There is no distension.      Palpations: Abdomen is soft.      Tenderness: There is no abdominal tenderness.   Musculoskeletal:         General: Normal range of motion.      Cervical back: Normal range of motion.   Skin:     General: Skin is warm and dry.   Neurological:      Mental Status: She is alert and oriented to person, place, and time.   Psychiatric:         Mood and Affect: Mood normal.         Behavior: Behavior normal.               Assessment:         ICD-10-CM ICD-9-CM   1. Early satiety  R68.81 780.94   2. Nausea and vomiting, unspecified vomiting type  R11.2 787.01   3. Bloating  R14.0 787.3       Plan:       Early satiety  -     Ambulatory referral/consult to Gastroenterology  -     NM Gastric " Emptying; Future; Expected date: 04/08/2025    Nausea and vomiting, unspecified vomiting type  -     Ambulatory referral/consult to Gastroenterology  -     NM Gastric Emptying; Future; Expected date: 04/08/2025  -     H. pylori antigen, stool; Future; Expected date: 04/08/2025  -     ondansetron (ZOFRAN-ODT) 4 MG TbDL; Take 1 tablet (4 mg total) by mouth every 6 (six) hours as needed (nausea).  Dispense: 60 tablet; Refill: 5    Bloating  -     H. pylori antigen, stool; Future; Expected date: 04/08/2025      Follow up if symptoms worsen or fail to improve.     Patient's Medications   New Prescriptions    ONDANSETRON (ZOFRAN-ODT) 4 MG TBDL    Take 1 tablet (4 mg total) by mouth every 6 (six) hours as needed (nausea).   Previous Medications    LEVONORGESTREL (MIRENA) 21 MCG/24 HOURS (8 YRS) 52 MG IUD    Mirena (52 MG)    NAPROXEN (NAPROSYN) 500 MG TABLET    Take 1 tablet (500 mg total) by mouth 2 (two) times daily with meals. for 15 days    OMEPRAZOLE (PRILOSEC) 20 MG CAPSULE    Take 1 capsule (20 mg total) by mouth once daily.   Modified Medications    No medications on file   Discontinued Medications    DICYCLOMINE (BENTYL) 10 MG CAPSULE    Take 1 capsule (10 mg total) by mouth 4 (four) times daily before meals and nightly. for 10 days

## 2025-04-10 ENCOUNTER — HOSPITAL ENCOUNTER (OUTPATIENT)
Dept: RADIOLOGY | Facility: HOSPITAL | Age: 30
Discharge: HOME | End: 2025-04-10
Attending: NURSE PRACTITIONER
Payer: MEDICAID

## 2025-04-10 DIAGNOSIS — R68.81 EARLY SATIETY: ICD-10-CM

## 2025-04-10 DIAGNOSIS — R11.2 NAUSEA AND VOMITING, UNSPECIFIED VOMITING TYPE: ICD-10-CM

## 2025-04-10 PROCEDURE — 78264 GASTRIC EMPTYING IMG STUDY: CPT | Mod: 26,,, | Performed by: STUDENT IN AN ORGANIZED HEALTH CARE EDUCATION/TRAINING PROGRAM

## 2025-04-10 PROCEDURE — A9541 TC99M SULFUR COLLOID: HCPCS | Performed by: NURSE PRACTITIONER

## 2025-04-10 PROCEDURE — 78264 GASTRIC EMPTYING IMG STUDY: CPT | Mod: TC

## 2025-04-10 RX ORDER — TECHNETIUM TC 99M SULFUR COLLOID 2 MG
1 KIT MISCELLANEOUS
Status: COMPLETED | OUTPATIENT
Start: 2025-04-10 | End: 2025-04-10

## 2025-04-10 RX ADMIN — TECHNETIUM TC 99M SULFUR COLLOID 1 MILLICURIE: KIT at 06:04

## 2025-04-14 ENCOUNTER — RESULTS FOLLOW-UP (OUTPATIENT)
Dept: GASTROENTEROLOGY | Facility: CLINIC | Age: 30
End: 2025-04-14

## 2025-05-14 ENCOUNTER — OFFICE VISIT (OUTPATIENT)
Dept: OBSTETRICS AND GYNECOLOGY | Facility: CLINIC | Age: 30
End: 2025-05-14
Payer: MEDICAID

## 2025-05-14 VITALS
DIASTOLIC BLOOD PRESSURE: 66 MMHG | HEIGHT: 61 IN | SYSTOLIC BLOOD PRESSURE: 100 MMHG | WEIGHT: 135.13 LBS | BODY MASS INDEX: 25.51 KG/M2

## 2025-05-14 DIAGNOSIS — N89.8 VAGINAL ODOR: Primary | ICD-10-CM

## 2025-05-14 PROCEDURE — 1159F MED LIST DOCD IN RCRD: CPT | Mod: CPTII,,, | Performed by: NURSE PRACTITIONER

## 2025-05-14 PROCEDURE — 3078F DIAST BP <80 MM HG: CPT | Mod: CPTII,,, | Performed by: NURSE PRACTITIONER

## 2025-05-14 PROCEDURE — 3008F BODY MASS INDEX DOCD: CPT | Mod: CPTII,,, | Performed by: NURSE PRACTITIONER

## 2025-05-14 PROCEDURE — 99213 OFFICE O/P EST LOW 20 MIN: CPT | Mod: PBBFAC,PN | Performed by: NURSE PRACTITIONER

## 2025-05-14 PROCEDURE — 3074F SYST BP LT 130 MM HG: CPT | Mod: CPTII,,, | Performed by: NURSE PRACTITIONER

## 2025-05-14 PROCEDURE — 99999 PR PBB SHADOW E&M-EST. PATIENT-LVL III: CPT | Mod: PBBFAC,,, | Performed by: NURSE PRACTITIONER

## 2025-05-14 PROCEDURE — 99214 OFFICE O/P EST MOD 30 MIN: CPT | Mod: S$PBB,,, | Performed by: NURSE PRACTITIONER

## 2025-05-14 RX ORDER — METRONIDAZOLE 500 MG/1
500 TABLET ORAL EVERY 12 HOURS
Qty: 14 TABLET | Refills: 0 | Status: SHIPPED | OUTPATIENT
Start: 2025-05-14 | End: 2025-05-21

## 2025-05-14 RX ORDER — ONABOTULINUMTOXINA 100 [USP'U]/1
INJECTION, POWDER, LYOPHILIZED, FOR SOLUTION INTRADERMAL; INTRAMUSCULAR
COMMUNITY
Start: 2025-04-10

## 2025-05-14 NOTE — PROGRESS NOTES
CC: Vaginal Discharge    Adeel Kern is a 30 y.o. female  presents with complaint of vaginal odor  for 3 weeks.  She denies itching.  reports occasional thin and white discharge.  Alleviating factors: OTC ph balance insert with no relied of symptoms . No new sexual partners.   dose ejaculate inside of her.   She does not get cycle with IUD in place.     ROS:  GENERAL: No fever, chills, fatigability or weight loss.  VULVAR: No pain, no lesions and no itching.  VAGINAL: No relaxation, no itching, no discharge, + odor, no abnormal bleeding and no lesions.  ABDOMEN: No abdominal pain. Denies nausea. Denies vomiting. No diarrhea. No constipation  BREAST: Denies pain. No lumps. No discharge.  URINARY: No incontinence, no nocturia, no frequency and no dysuria.  CARDIOVASCULAR: No chest pain. No shortness of breath. No leg cramps.  NEUROLOGICAL: No headaches. No vision changes.    PHYSICAL EXAM:  VULVA: normal appearing vulva with no masses, tenderness or lesions   VAGINA: normal appearing vagina with normal color and + thin discharge, no lesions   CERVIX: normal appearing cervix without discharge or lesions  IUD strings visualized at os- 2 cm out.  UTERUS: uterus is normal size, shape, consistency and nontender   ADNEXA: normal adnexa in size, nontender and no masses    ASSESSMENT and PLAN:    ICD-10-CM ICD-9-CM    1. Vaginal odor  N89.8 625.8 metroNIDAZOLE (FLAGYL) 500 MG tablet      Vaginosis Screen by DNA Probe        Vaginosis cx  Flagyl for suspected BV  Nuvessa X 1 time dose.   Discussed to have partner withdraw prior to ejaculation   Can consider post coital boric acid if needed    Patient was counseled today on vaginitis prevention including :  a. avoiding feminine products such as deoderant soaps, body wash, bubble bath, douches, scented toilet paper, deoderant tampons or pads, feminine wipes, chronic pad use, etc.  b. avoiding other vulvovaginal irritants such as long hot baths, humidity, tight,  synthetic clothing, chlorine and sitting around in wet bathing suits  c. wearing cotton underwear, avoiding thong underwear and no underwear to bed  d. taking showers instead of baths and use a hair dryer on cool setting afterwards to dry  e. wearing cotton to exercise and shower immediately after exercise and change clothes  f. using polyurethane condoms without spermicide if sexually active and symptoms are triggered by intercourse    FOLLOW UP: PRN lack of improvement.    Denise Duarte, ROSANGELAP-C

## 2025-07-24 ENCOUNTER — LAB VISIT (OUTPATIENT)
Dept: LAB | Facility: HOSPITAL | Age: 30
End: 2025-07-24
Payer: MEDICAID

## 2025-07-24 ENCOUNTER — OFFICE VISIT (OUTPATIENT)
Dept: PRIMARY CARE CLINIC | Facility: CLINIC | Age: 30
End: 2025-07-24
Payer: MEDICAID

## 2025-07-24 VITALS
TEMPERATURE: 98 F | RESPIRATION RATE: 17 BRPM | HEART RATE: 69 BPM | SYSTOLIC BLOOD PRESSURE: 105 MMHG | BODY MASS INDEX: 26.62 KG/M2 | HEIGHT: 61 IN | DIASTOLIC BLOOD PRESSURE: 71 MMHG | WEIGHT: 141 LBS | OXYGEN SATURATION: 100 %

## 2025-07-24 DIAGNOSIS — R42 DIZZINESS: ICD-10-CM

## 2025-07-24 DIAGNOSIS — R42 DIZZINESS: Primary | ICD-10-CM

## 2025-07-24 DIAGNOSIS — T70.29XA SHORTNESS OF BREATH ASSOCIATED WITH HIGH ALTITUDE, INITIAL ENCOUNTER: ICD-10-CM

## 2025-07-24 LAB
ABSOLUTE EOSINOPHIL (OHS): 0.04 K/UL
ABSOLUTE MONOCYTE (OHS): 0.48 K/UL (ref 0.3–1)
ABSOLUTE NEUTROPHIL COUNT (OHS): 4.96 K/UL (ref 1.8–7.7)
ALBUMIN SERPL BCP-MCNC: 4.5 G/DL (ref 3.5–5.2)
ALP SERPL-CCNC: 39 UNIT/L (ref 40–150)
ALT SERPL W/O P-5'-P-CCNC: 11 UNIT/L (ref 10–44)
ANION GAP (OHS): 11 MMOL/L (ref 8–16)
AST SERPL-CCNC: 20 UNIT/L (ref 11–45)
BASOPHILS # BLD AUTO: 0.03 K/UL
BASOPHILS NFR BLD AUTO: 0.3 %
BILIRUB SERPL-MCNC: 0.6 MG/DL (ref 0.1–1)
BUN SERPL-MCNC: 14 MG/DL (ref 6–20)
CALCIUM SERPL-MCNC: 9.4 MG/DL (ref 8.7–10.5)
CHLORIDE SERPL-SCNC: 105 MMOL/L (ref 95–110)
CO2 SERPL-SCNC: 21 MMOL/L (ref 23–29)
CREAT SERPL-MCNC: 0.7 MG/DL (ref 0.5–1.4)
ERYTHROCYTE [DISTWIDTH] IN BLOOD BY AUTOMATED COUNT: 12.6 % (ref 11.5–14.5)
GFR SERPLBLD CREATININE-BSD FMLA CKD-EPI: >60 ML/MIN/1.73/M2
GLUCOSE SERPL-MCNC: 62 MG/DL (ref 70–110)
HCT VFR BLD AUTO: 39.1 % (ref 37–48.5)
HGB BLD-MCNC: 12.9 GM/DL (ref 12–16)
IMM GRANULOCYTES # BLD AUTO: 0.02 K/UL (ref 0–0.04)
IMM GRANULOCYTES NFR BLD AUTO: 0.2 % (ref 0–0.5)
LYMPHOCYTES # BLD AUTO: 3.3 K/UL (ref 1–4.8)
MCH RBC QN AUTO: 31.7 PG (ref 27–31)
MCHC RBC AUTO-ENTMCNC: 33 G/DL (ref 32–36)
MCV RBC AUTO: 96 FL (ref 82–98)
NUCLEATED RBC (/100WBC) (OHS): 0 /100 WBC
OHS QRS DURATION: 86 MS
OHS QTC CALCULATION: 408 MS
PLATELET # BLD AUTO: 315 K/UL (ref 150–450)
PMV BLD AUTO: 10.6 FL (ref 9.2–12.9)
POTASSIUM SERPL-SCNC: 4.1 MMOL/L (ref 3.5–5.1)
PROT SERPL-MCNC: 7.8 GM/DL (ref 6–8.4)
RBC # BLD AUTO: 4.07 M/UL (ref 4–5.4)
RELATIVE EOSINOPHIL (OHS): 0.5 %
RELATIVE LYMPHOCYTE (OHS): 37.4 % (ref 18–48)
RELATIVE MONOCYTE (OHS): 5.4 % (ref 4–15)
RELATIVE NEUTROPHIL (OHS): 56.2 % (ref 38–73)
SODIUM SERPL-SCNC: 137 MMOL/L (ref 136–145)
WBC # BLD AUTO: 8.83 K/UL (ref 3.9–12.7)

## 2025-07-24 PROCEDURE — 3074F SYST BP LT 130 MM HG: CPT | Mod: CPTII,,,

## 2025-07-24 PROCEDURE — 93005 ELECTROCARDIOGRAM TRACING: CPT | Mod: PBBFAC,PN | Performed by: INTERNAL MEDICINE

## 2025-07-24 PROCEDURE — 1159F MED LIST DOCD IN RCRD: CPT | Mod: CPTII,,,

## 2025-07-24 PROCEDURE — 1160F RVW MEDS BY RX/DR IN RCRD: CPT | Mod: CPTII,,,

## 2025-07-24 PROCEDURE — 3008F BODY MASS INDEX DOCD: CPT | Mod: CPTII,,,

## 2025-07-24 PROCEDURE — 3078F DIAST BP <80 MM HG: CPT | Mod: CPTII,,,

## 2025-07-24 PROCEDURE — 36415 COLL VENOUS BLD VENIPUNCTURE: CPT

## 2025-07-24 PROCEDURE — 82040 ASSAY OF SERUM ALBUMIN: CPT

## 2025-07-24 PROCEDURE — 85025 COMPLETE CBC W/AUTO DIFF WBC: CPT

## 2025-07-24 PROCEDURE — 99999 PR PBB SHADOW E&M-EST. PATIENT-LVL IV: CPT | Mod: PBBFAC,,,

## 2025-07-24 PROCEDURE — 99214 OFFICE O/P EST MOD 30 MIN: CPT | Mod: PBBFAC,PN,25

## 2025-07-24 PROCEDURE — 99213 OFFICE O/P EST LOW 20 MIN: CPT | Mod: S$PBB,,,

## 2025-07-24 PROCEDURE — 93010 ELECTROCARDIOGRAM REPORT: CPT | Mod: S$PBB,,, | Performed by: INTERNAL MEDICINE

## 2025-07-24 NOTE — PROGRESS NOTES
"SUBJECTIVE     Chief Complaint   Patient presents with    Dizziness     Dizziness while at gym. Felt like a panic attack       HPI  Adeel Kern is a 30 y.o. female with medical diagnoses as listed in the medical history and problem list that presents for evaluation    HPI     History of Present Illness    CHIEF COMPLAINT:  Patient presents today for dizziness experienced at the gym.    HISTORY OF PRESENT ILLNESS:  She reports two recent episodes of dizziness during exercise. The first episode occurred while on [employer] after 26 minutes of activity, accompanied by significant hand tremors and fear of falling. She felt so dizzy she had to remove her headphones. She managed the episode by lowering exercise intensity, breathing deeply, and drinking water. The second episode occurred the next day during leg exercises while lifting weights, when she felt "totally dizzy" after completing sets. She denies any prior similar episodes and reports feeling well today with no dizziness while walking. Both episodes occurred early morning around 5:40 AM without prior food intake, only water consumption, and no pre-workout supplements. She acknowledges tachycardia during these episodes but attributes it to exercise intensity.    MEDICATIONS:  She takes omeprazole occasionally.    Patient reports she will be traveling to [facility], reports when she travels there she tends to get SOB due to high altitude.    Patient denies any shortness of breath, dizziness, headaches, N/V, vision changes, chest pains, or palpitations at present time.      ROS:  General: -fever, -chills, -fatigue, -weight gain, -weight loss  Eyes: -vision changes, -redness, -discharge  ENT: -ear pain, -nasal congestion, -sore throat  Cardiovascular: -chest pain, -palpitations, -lower extremity edema, +feelings of fast heart rate  Respiratory: -cough, -shortness of breath  Gastrointestinal: -abdominal pain, -nausea, -vomiting, -diarrhea, -constipation, -blood in " "stool  Genitourinary: -dysuria, -hematuria, -frequency  Musculoskeletal: -joint pain, -muscle pain  Skin: -rash, -lesion  Neurological: -headache, +dizziness, -numbness, -tingling, -tremors  Psychiatric: -anxiety, -depression, -sleep difficulty         PAST MEDICAL HISTORY:  Past Medical History:   Diagnosis Date    Urinary tract infection     Vaginal infection        ALLERGIES AND MEDICATIONS: updated and reviewed.  Review of patient's allergies indicates:  No Known Allergies  Current Outpatient Medications   Medication Sig Dispense Refill    BOTOX 100 unit SolR SMARTSI Unit(s) Once a Week (Patient taking differently: as needed.)      levonorgestreL (MIRENA) 21 mcg/24 hours (8 yrs) 52 mg IUD Mirena (52 MG)      omeprazole (PRILOSEC) 20 MG capsule Take 1 capsule (20 mg total) by mouth once daily. 90 capsule 3    ondansetron (ZOFRAN-ODT) 4 MG TbDL Take 1 tablet (4 mg total) by mouth every 6 (six) hours as needed (nausea). 60 tablet 5    albuterol (VENTOLIN HFA) 90 mcg/actuation inhaler Inhale 2 puffs into the lungs every 6 (six) hours as needed for Wheezing. Rescue 18 g 0     No current facility-administered medications for this visit.     OBJECTIVE     Physical Exam  Vitals:    25 0905   BP: 105/71   Pulse: 69   Resp: 17   Temp: 97.6 °F (36.4 °C)    Body mass index is 26.64 kg/m².  Weight: 64 kg (141 lb)   Height: 5' 1" (154.9 cm)     Physical Exam  Vitals reviewed.   Constitutional:       General: She is not in acute distress.     Appearance: Normal appearance. She is not ill-appearing.   HENT:      Right Ear: External ear normal.      Left Ear: External ear normal.      Nose: Nose normal.      Mouth/Throat:      Mouth: Mucous membranes are moist.   Eyes:      Extraocular Movements: Extraocular movements intact.      Conjunctiva/sclera: Conjunctivae normal.      Pupils: Pupils are equal, round, and reactive to light.   Pulmonary:      Effort: Pulmonary effort is normal.   Abdominal:      General: There is " no distension.      Palpations: Abdomen is soft.   Musculoskeletal:         General: No swelling. Normal range of motion.      Cervical back: Normal range of motion.   Skin:     General: Skin is warm and dry.      Findings: No rash.   Neurological:      General: No focal deficit present.      Mental Status: She is alert and oriented to person, place, and time.   Psychiatric:         Mood and Affect: Mood normal.         Behavior: Behavior normal.           Health Maintenance         Date Due Completion Date    TETANUS VACCINE Never done ---    COVID-19 Vaccine (3 - 2024-25 season) 09/01/2024 5/12/2021    Influenza Vaccine (1) 09/01/2025 ---    Cervical Cancer Screening 07/21/2028 7/21/2023    RSV Vaccine (Age 60+ and Pregnant patients) (1 - 1-dose 75+ series) 03/25/2068 ---          ASSESSMENT     30 y.o. female with     1. Dizziness    2. Shortness of breath associated with high altitude, initial encounter        PLAN:     1. Dizziness  New. Addressing.   - Comprehensive Metabolic Panel; Future  - CBC Auto Differential; Future  - IN OFFICE EKG 12-LEAD (to [facility])-- Normal sinus rhythm  - Ambulatory referral/consult to Cardiology; Future    2. Shortness of breath associated with high altitude, initial encounter  Patient planning to travel to [facility] and reports altitude makes her short of breath.   - albuterol (VENTOLIN HFA) 90 mcg/actuation inhaler; Inhale 2 puffs into the lungs every 6 (six) hours as needed for Wheezing. Rescue  Dispense: 18 g; Refill: 0      Assessment & Plan      CARDIAC ARRHYTHMIA:  - Patient experienced dizziness, shaking hands, and racing heart during exercise, possibly related to arrhythmia diagnosed last year.  - ECG was performed previously but no stress test.  - Ordered EKG to evaluate current cardiac status.  - Referred the patient to cardiology for stress test to evaluate heart function and palpitations during exercise.  - Advised the patient to follow up with previous  cardiologist.    DECREASED WHITE BLOOD CELL COUNT:  - Patient had low red blood cell count previously and wants to check it again.  - Ordered labs including CBC and RBC count.  - Instructed the patient to complete ordered labs at [place] or [place] lab.    DIZZINESS AND GIDDINESS:  - Patient experienced dizziness and shaking during significant exercise at the gym but felt better after resting and drinking water.  - Ordered labs including renal function tests to evaluate possible causes.  - Referred the patient to cardiologist for stress test to evaluate dizziness during exercise.  - Instructed the patient to complete ordered labs at [place] or [place] lab.         ELIF Merida  [facility]  07/24/2025 9:29 AM    I spent a total of 25 minutes on the day of the visit.This includes face to face time and non-face to face time preparing to see the patient (eg, review of tests), obtaining and/or reviewing separately obtained history, documenting clinical information in the electronic or other health record, independently interpreting results and communicating results to the patient/family/caregiver, or care coordinator.     Follow up if symptoms worsen or fail to improve.    This note was generated with the assistance of ambient listening technology. Verbal consent was obtained by the patient and accompanying visitor(s) for the recording of patient appointment to facilitate this note. I attest to having reviewed and edited the generated note for accuracy, though some syntax or spelling errors may persist. Please contact the author of this note for any clarification.

## 2025-07-25 ENCOUNTER — OFFICE VISIT (OUTPATIENT)
Dept: CARDIOLOGY | Facility: CLINIC | Age: 30
End: 2025-07-25
Payer: MEDICAID

## 2025-07-25 VITALS
SYSTOLIC BLOOD PRESSURE: 113 MMHG | BODY MASS INDEX: 26.6 KG/M2 | DIASTOLIC BLOOD PRESSURE: 74 MMHG | OXYGEN SATURATION: 100 % | HEIGHT: 61 IN | WEIGHT: 140.88 LBS | HEART RATE: 70 BPM

## 2025-07-25 DIAGNOSIS — R42 EPISODE OF DIZZINESS: Primary | ICD-10-CM

## 2025-07-25 DIAGNOSIS — R42 DIZZINESS: ICD-10-CM

## 2025-07-25 DIAGNOSIS — R94.31 NONSPECIFIC ABNORMAL ELECTROCARDIOGRAM (ECG) (EKG): ICD-10-CM

## 2025-07-25 DIAGNOSIS — R42 EPISODIC LIGHTHEADEDNESS: ICD-10-CM

## 2025-07-25 DIAGNOSIS — K21.9 GASTROESOPHAGEAL REFLUX DISEASE WITHOUT ESOPHAGITIS: ICD-10-CM

## 2025-07-25 DIAGNOSIS — E66.3 OVERWEIGHT WITH BODY MASS INDEX (BMI) 25.0-29.9: ICD-10-CM

## 2025-07-25 PROCEDURE — 99999 PR PBB SHADOW E&M-EST. PATIENT-LVL III: CPT | Mod: PBBFAC,,,

## 2025-07-25 PROCEDURE — 99213 OFFICE O/P EST LOW 20 MIN: CPT | Mod: PBBFAC,PN

## 2025-07-25 RX ORDER — GLYCOPYRRONIUM 2.4 G/100G
CLOTH TOPICAL
COMMUNITY

## 2025-07-25 RX ORDER — ALBUTEROL SULFATE 90 UG/1
2 INHALANT RESPIRATORY (INHALATION) EVERY 6 HOURS PRN
Qty: 18 G | Refills: 0 | Status: SHIPPED | OUTPATIENT
Start: 2025-07-25 | End: 2026-07-25

## 2025-07-25 NOTE — PROGRESS NOTES
Subjective:    Patient ID:  Adeel Kern is a 30 y.o. female who presents for evaluation of No chief complaint on file.      PCP: Mitzi, Primary Doctor     Referring Provider: ELIF Merida     History of Present Illness    CHIEF COMPLAINT:  Patient presents today for evaluation of dizziness and nervousness that occurred during exercise.    HISTORY OF PRESENT ILLNESS:  She experienced her first episode of dizziness, near-syncope, palpitations described as racing, and shakiness during an early morning workout at 4:30 AM while using the step machine. She had not eaten prior to exercise and consumed one bottle of water during the workout. She was able to manage symptoms with deep breathing and did not lose consciousness. She denies dyspnea and chest pain during the episode.    CARDIAC HISTORY:  She had an EKG last year showing sinus bradycardia without associated symptoms. She denies LLE swelling.    FAMILY HISTORY:  She denies family history of cardiac disease. Both parents are in good health.    MEDICATIONS:  She is prescribed Albuterol inhaler prophylactically for upcoming travel to high altitude region in [facility], without underlying history of asthma. She previously used omeprazole for reflux symptoms.    DIET:  She reports attempting to reduce carbohydrate and sugar intake for weight loss.      ROS:  General: -fever, -chills, -fatigue, -weight gain, -weight loss  Eyes: -vision changes, -redness, -discharge  ENT: -ear pain, -nasal congestion, -sore throat  Cardiovascular: -chest pain, +palpitations, -lower extremity edema, +feelings of fast heart rate  Respiratory: -cough, -shortness of breath  Gastrointestinal: -abdominal pain, -nausea, -vomiting, -diarrhea, -constipation, -blood in stool  Genitourinary: -dysuria, -hematuria, -frequency  Musculoskeletal: -joint pain, -muscle pain  Skin: -rash, -lesion  Neurological: -headache, +dizziness, -numbness, -tingling, +lightheadedness  Psychiatric: +anxiety,  -depression, -sleep difficulty            Hx GERD    Past Medical History:   Diagnosis Date    Urinary tract infection     Vaginal infection      Past Surgical History:   Procedure Laterality Date    breast augmentation      teeth removal       Social History[1]  Family History   Problem Relation Name Age of Onset    Kidney disease Neg Hx      Breast cancer Neg Hx      Colon cancer Neg Hx      Ovarian cancer Neg Hx         Review of patient's allergies indicates:  No Known Allergies    Medication List with Changes/Refills   Current Medications    ALBUTEROL (VENTOLIN HFA) 90 MCG/ACTUATION INHALER    Inhale 2 puffs into the lungs every 6 (six) hours as needed for Wheezing. Rescue    BOTOX 100 UNIT SOLR    SMARTSI Unit(s) Once a Week    GLYCOPYRRONIUM TOSYLATE (QBREXZA) 2.4 % TOWL    as directed applied topically to underarms once a night at bedtime; Duration: 30 days    LEVONORGESTREL (MIRENA) 21 MCG/24 HOURS (8 YRS) 52 MG IUD    Mirena (52 MG)    OMEPRAZOLE (PRILOSEC) 20 MG CAPSULE    Take 1 capsule (20 mg total) by mouth once daily.    ONDANSETRON (ZOFRAN-ODT) 4 MG TBDL    Take 1 tablet (4 mg total) by mouth every 6 (six) hours as needed (nausea).       Review of Systems   Constitutional: Negative for diaphoresis and fever.   HENT:  Negative for congestion and hearing loss.    Eyes:  Negative for blurred vision and pain.   Cardiovascular:  Positive for irregular heartbeat and palpitations. Negative for chest pain, claudication, dyspnea on exertion, leg swelling, near-syncope and syncope.   Respiratory:  Negative for shortness of breath and sleep disturbances due to breathing.    Hematologic/Lymphatic: Negative for bleeding problem. Does not bruise/bleed easily.   Skin:  Negative for color change and poor wound healing.   Gastrointestinal:  Negative for abdominal pain and nausea.   Genitourinary:  Negative for bladder incontinence and flank pain.   Neurological:  Positive for dizziness and light-headedness.  "Negative for focal weakness.        Objective:   /74 (BP Location: Left arm, Patient Position: Sitting)   Pulse 70   Ht 5' 1" (1.549 m)   Wt 63.9 kg (140 lb 14 oz)   LMP 07/17/2025 (Approximate)   SpO2 100%   BMI 26.62 kg/m²    Physical Exam  Constitutional:       Appearance: She is well-developed. She is not diaphoretic.   HENT:      Head: Normocephalic and atraumatic.   Eyes:      General: No scleral icterus.     Pupils: Pupils are equal, round, and reactive to light.   Neck:      Vascular: No JVD.   Cardiovascular:      Rate and Rhythm: Normal rate and regular rhythm.      Pulses: Intact distal pulses.           Radial pulses are 2+ on the right side and 2+ on the left side.        Dorsalis pedis pulses are 2+ on the right side and 2+ on the left side.        Posterior tibial pulses are 2+ on the right side and 2+ on the left side.      Heart sounds: S1 normal and S2 normal. No murmur heard.     No friction rub. No gallop.   Pulmonary:      Effort: Pulmonary effort is normal. No respiratory distress.      Breath sounds: Normal breath sounds. No wheezing or rales.   Chest:      Chest wall: No tenderness.   Abdominal:      General: Bowel sounds are normal. There is no distension.      Palpations: Abdomen is soft. There is no mass.      Tenderness: There is no abdominal tenderness. There is no rebound.   Musculoskeletal:         General: No tenderness. Normal range of motion.      Cervical back: Normal range of motion and neck supple.   Skin:     General: Skin is warm and dry.      Coloration: Skin is not pale.   Neurological:      Mental Status: She is alert and oriented to person, place, and time.      Coordination: Coordination normal.      Deep Tendon Reflexes: Reflexes normal.   Psychiatric:         Behavior: Behavior normal.         Judgment: Judgment normal.             EKG reviewed 7/24/25: NSR     EKG reviewed 5/29/2024: Sinus bradycardia, IRBBB    Assessment:       1. Episode of dizziness    2. " Dizziness    3. Nonspecific abnormal electrocardiogram (ECG) (EKG)    4. Episodic lightheadedness    5. Overweight with body mass index (BMI) 25.0-29.9    6. Gastroesophageal reflux disease without esophagitis         Plan:         Episode of dizziness  -48 HR Holter to evaluate for arrhythmias     Nonspecific abnormal electrocardiogram (ECG) (EKG)  EKG reviewed 7/24/25: NSR     EKG reviewed 5/29/2024: Sinus bradycardia, IRBBB  -Exercise stress test to evaluate for ischemia     Episodic lightheadedness  -48 HR Holter to evaluate for arrhythmias     Overweight with body mass index (BMI) 25.0-29.9  -encouraged lifestyle modifications, diet, and exercise     Gastroesophageal reflux disease without esophagitis  -Manged by PCP  -controlled w medical therapy- omeprazole 20 mg       Total duration of face to face visit time 30 minutes.  Total time spent counseling greater than fifty percent of total visit time.  Counseling included discussion regarding imaging findings, diagnosis, possibilities, treatment options, risks and benefits.  The patient had many questions regarding the options and long-term effects      Kirill Hoffmann, DEANGELO  Cardiology-[place]            This note was generated with the assistance of ambient listening technology. Verbal consent was obtained by the patient and accompanying visitor(s) for the recording of patient appointment to facilitate this note. I attest to having reviewed and edited the generated note for accuracy, though some syntax or spelling errors may persist. Please contact the author of this note for any clarification.                  [1]   Social History  Socioeconomic History    Marital status:    Tobacco Use    Smoking status: Never     Passive exposure: Never    Smokeless tobacco: Never   Substance and Sexual Activity    Alcohol use: No    Drug use: No    Sexual activity: Yes     Partners: Male     Birth control/protection: None, I.U.D.     Social Drivers of Health     Financial  Resource Strain: Low Risk  (7/24/2025)    Overall Financial Resource Strain (CARDIA)     Difficulty of Paying Living Expenses: Not hard at all   Food Insecurity: No Food Insecurity (7/24/2025)    Hunger Vital Sign     Worried About Running Out of Food in the Last Year: Never true     Ran Out of Food in the Last Year: Never true   Transportation Needs: No Transportation Needs (7/24/2025)    PRAPARE - Transportation     Lack of Transportation (Medical): No     Lack of Transportation (Non-Medical): No   Physical Activity: Insufficiently Active (7/24/2025)    Exercise Vital Sign     Days of Exercise per Week: 5 days     Minutes of Exercise per Session: 20 min   Stress: Stress Concern Present (7/24/2025)    [employer] - Occupational Stress Questionnaire     Feeling of Stress : Rather much   Housing Stability: Low Risk  (7/24/2025)    Housing Stability Vital Sign     Unable to Pay for Housing in the Last Year: No     Homeless in the Last Year: No

## 2025-07-25 NOTE — ASSESSMENT & PLAN NOTE
EKG reviewed 7/24/25: NSR     EKG reviewed 5/29/2024: Sinus bradycardia, IRBBB  -Exercise stress test to evaluate for ischemia